# Patient Record
Sex: FEMALE | Race: WHITE | NOT HISPANIC OR LATINO | Employment: OTHER | URBAN - METROPOLITAN AREA
[De-identification: names, ages, dates, MRNs, and addresses within clinical notes are randomized per-mention and may not be internally consistent; named-entity substitution may affect disease eponyms.]

---

## 2024-02-22 PROBLEM — S01.81XA FOREHEAD LACERATION: Status: ACTIVE | Noted: 2024-02-22

## 2024-02-22 RX ORDER — CEVIMELINE HYDROCHLORIDE 30 MG/1
30 CAPSULE ORAL EVERY 12 HOURS
COMMUNITY

## 2024-02-22 RX ORDER — ASPIRIN 81 MG/1
81 TABLET ORAL DAILY
COMMUNITY

## 2024-02-22 RX ORDER — LORAZEPAM 0.5 MG/1
TABLET ORAL
Status: ON HOLD | COMMUNITY
End: 2024-05-09 | Stop reason: ALTCHOICE

## 2024-02-22 RX ORDER — CELECOXIB 200 MG/1
200 CAPSULE ORAL
COMMUNITY
End: 2024-05-21 | Stop reason: HOSPADM

## 2024-02-22 RX ORDER — TERIPARATIDE 250 UG/ML
INJECTION, SOLUTION SUBCUTANEOUS
Status: ON HOLD | COMMUNITY
End: 2024-05-09 | Stop reason: WASHOUT

## 2024-02-22 RX ORDER — MISOPROSTOL 200 UG/1
200 TABLET ORAL 2 TIMES DAILY
COMMUNITY

## 2024-02-22 RX ORDER — SIMVASTATIN 40 MG/1
40 TABLET, FILM COATED ORAL DAILY
COMMUNITY

## 2024-02-22 RX ORDER — VENLAFAXINE 75 MG/1
TABLET ORAL
Status: ON HOLD | COMMUNITY
End: 2024-05-09 | Stop reason: WASHOUT

## 2024-02-22 RX ORDER — BUDESONIDE 3 MG/1
9 CAPSULE, COATED PELLETS ORAL DAILY
COMMUNITY

## 2024-02-22 RX ORDER — LORAZEPAM 1 MG/1
1.5 TABLET ORAL NIGHTLY
COMMUNITY

## 2024-02-22 RX ORDER — MULTIVIT-MIN/FA/LYCOPEN/LUTEIN .4-300-25
TABLET ORAL
COMMUNITY

## 2024-02-22 RX ORDER — LIFITEGRAST 50 MG/ML
1 SOLUTION/ DROPS OPHTHALMIC DAILY
Status: ON HOLD | COMMUNITY
End: 2024-05-09 | Stop reason: WASHOUT

## 2024-02-22 RX ORDER — LISINOPRIL 10 MG/1
10 TABLET ORAL DAILY
COMMUNITY

## 2024-02-22 RX ORDER — GOLIMUMAB 100 MG/ML
1 INJECTION, SOLUTION SUBCUTANEOUS
Status: ON HOLD | COMMUNITY
End: 2024-05-09 | Stop reason: WASHOUT

## 2024-02-22 RX ORDER — OMEGA-3-ACID ETHYL ESTERS 1 G/1
1 CAPSULE, LIQUID FILLED ORAL DAILY
COMMUNITY

## 2024-02-22 RX ORDER — VENLAFAXINE HYDROCHLORIDE 75 MG/1
1 TABLET, EXTENDED RELEASE ORAL DAILY
COMMUNITY

## 2024-02-22 RX ORDER — ACETAMINOPHEN 325 MG/1
TABLET ORAL EVERY 4 HOURS PRN
COMMUNITY
Start: 2018-07-28

## 2024-02-22 RX ORDER — ACETAMINOPHEN 500 MG
1 TABLET ORAL DAILY
COMMUNITY

## 2024-02-22 RX ORDER — PREDNISONE 1 MG/1
3 TABLET ORAL DAILY
COMMUNITY

## 2024-02-22 RX ORDER — LANSOPRAZOLE 30 MG/1
CAPSULE, DELAYED RELEASE ORAL
Status: ON HOLD | COMMUNITY
End: 2024-05-09 | Stop reason: WASHOUT

## 2024-02-22 RX ORDER — BUDESONIDE 9 MG/1
9 TABLET, FILM COATED, EXTENDED RELEASE ORAL DAILY
Status: ON HOLD | COMMUNITY
End: 2024-05-09 | Stop reason: WASHOUT

## 2024-02-22 RX ORDER — CALCIUM CARBONATE/VITAMIN D3 600MG-5MCG
1 TABLET ORAL DAILY
COMMUNITY

## 2024-02-22 RX ORDER — FENOFIBRATE 48 MG/1
48 TABLET, FILM COATED ORAL DAILY
COMMUNITY

## 2024-03-05 ENCOUNTER — APPOINTMENT (OUTPATIENT)
Dept: NEUROSURGERY | Facility: CLINIC | Age: 74
End: 2024-03-05
Payer: MEDICARE

## 2024-03-19 ENCOUNTER — HOSPITAL ENCOUNTER (OUTPATIENT)
Dept: RADIOLOGY | Facility: CLINIC | Age: 74
Discharge: HOME | End: 2024-03-19
Payer: MEDICARE

## 2024-03-19 ENCOUNTER — OFFICE VISIT (OUTPATIENT)
Dept: NEUROSURGERY | Facility: CLINIC | Age: 74
End: 2024-03-19
Payer: MEDICARE

## 2024-03-19 ENCOUNTER — APPOINTMENT (OUTPATIENT)
Dept: NEUROSURGERY | Facility: CLINIC | Age: 74
End: 2024-03-19
Payer: MEDICARE

## 2024-03-19 VITALS
HEART RATE: 94 BPM | TEMPERATURE: 96.4 F | WEIGHT: 128 LBS | BODY MASS INDEX: 21.85 KG/M2 | RESPIRATION RATE: 18 BRPM | DIASTOLIC BLOOD PRESSURE: 87 MMHG | SYSTOLIC BLOOD PRESSURE: 128 MMHG | HEIGHT: 64 IN

## 2024-03-19 DIAGNOSIS — G89.29 CHRONIC MIDLINE LOW BACK PAIN WITH SCIATICA, SCIATICA LATERALITY UNSPECIFIED: Primary | ICD-10-CM

## 2024-03-19 DIAGNOSIS — Z98.1 S/P LUMBAR SPINAL FUSION: ICD-10-CM

## 2024-03-19 DIAGNOSIS — Z98.890 STATUS POST SPINAL SURGERY: ICD-10-CM

## 2024-03-19 DIAGNOSIS — M43.16 SPONDYLOLISTHESIS, LUMBAR REGION: ICD-10-CM

## 2024-03-19 DIAGNOSIS — M54.16 LUMBAR RADICULOPATHY, ACUTE: ICD-10-CM

## 2024-03-19 DIAGNOSIS — M96.0 PSEUDARTHROSIS FOLLOWING SPINAL FUSION: ICD-10-CM

## 2024-03-19 DIAGNOSIS — M43.8X9 SAGITTAL PLANE IMBALANCE: ICD-10-CM

## 2024-03-19 DIAGNOSIS — M53.2X6 LUMBAR SPINE INSTABILITY: ICD-10-CM

## 2024-03-19 DIAGNOSIS — M48.061 LUMBAR FORAMINAL STENOSIS: ICD-10-CM

## 2024-03-19 DIAGNOSIS — M51.36 LUMBAR SPINAL STENOSIS DUE TO ADJACENT SEGMENT DISEASE AFTER FUSION PROCEDURE: ICD-10-CM

## 2024-03-19 DIAGNOSIS — M54.40 CHRONIC MIDLINE LOW BACK PAIN WITH SCIATICA, SCIATICA LATERALITY UNSPECIFIED: Primary | ICD-10-CM

## 2024-03-19 DIAGNOSIS — M48.061 LUMBAR SPINAL STENOSIS DUE TO ADJACENT SEGMENT DISEASE AFTER FUSION PROCEDURE: ICD-10-CM

## 2024-03-19 PROCEDURE — 1125F AMNT PAIN NOTED PAIN PRSNT: CPT | Performed by: NEUROLOGICAL SURGERY

## 2024-03-19 PROCEDURE — 1036F TOBACCO NON-USER: CPT | Performed by: NEUROLOGICAL SURGERY

## 2024-03-19 PROCEDURE — 99215 OFFICE O/P EST HI 40 MIN: CPT | Performed by: NEUROLOGICAL SURGERY

## 2024-03-19 PROCEDURE — 99205 OFFICE O/P NEW HI 60 MIN: CPT | Performed by: NEUROLOGICAL SURGERY

## 2024-03-19 PROCEDURE — 72082 X-RAY EXAM ENTIRE SPI 2/3 VW: CPT

## 2024-03-19 PROCEDURE — 72082 X-RAY EXAM ENTIRE SPI 2/3 VW: CPT | Performed by: RADIOLOGY

## 2024-03-19 PROCEDURE — 1159F MED LIST DOCD IN RCRD: CPT | Performed by: NEUROLOGICAL SURGERY

## 2024-03-19 RX ORDER — TRAMADOL HYDROCHLORIDE 50 MG/1
50 TABLET ORAL 2 TIMES DAILY PRN
COMMUNITY
End: 2024-05-21 | Stop reason: HOSPADM

## 2024-03-19 RX ORDER — METHOCARBAMOL 500 MG/1
500 TABLET, FILM COATED ORAL 3 TIMES DAILY PRN
COMMUNITY

## 2024-03-19 RX ORDER — GABAPENTIN 100 MG/1
100 CAPSULE ORAL 2 TIMES DAILY
COMMUNITY

## 2024-03-19 ASSESSMENT — PAIN SCALES - GENERAL: PAINLEVEL: 7

## 2024-03-19 NOTE — PROGRESS NOTES
It was a pleasure to see Ms. Jack at the [facility] at [facility].     She is a really nice 73 y.o. female  who presents to us with complaints MID BACK PAIN  that started about  3 months ago, and have been gradually worsening since that time.  The symptoms started after no known injury    The pain is 7 /10. The pain is described as aching, burning, pinching, and stabbing and occurs all day.  Symptoms are exacerbated by walking for more than a few minutes. Factors which relieve the pain include change in body position and sitting       Numbness and/or tingling - YES- left thigh    Weakness : YES    Bladder/Bowel symptoms - NO    The patient has tried medications (eg: gabapentin, NSAIDS and narcotics ) : Yes- gabapentin   PT : Yes    Date: 2024  Pain Management with ESIs/selective nerve blocks  - YES    she is a NON-SMOKER and NON-DIABETIC    History of Depression : NO    PRIOR SPINE SURGERY: YES- L3-L4 fusion March 2023 (Dr. Neptali [facility]; September 2020 lamiectomy w/ fusion L4-L5 ([facility])    She has a known diagnosis of rheumatoid arthritis and has been on Orencia for that.  She is also on Vibrezi for IBS.    Is currently on Aspirin 81mg daily    NARCOTICS for pain : NO    Part of this patient’s history is from personal review of the patient’s previous charts.      No past medical history on file.        Current Outpatient Medications:     acetaminophen (Tylenol) 325 mg tablet, Take by mouth every 4 hours if needed., Disp: , Rfl:     aspirin 81 mg EC tablet, Take 1 tablet (81 mg) by mouth once daily., Disp: , Rfl:     budesonide EC (Entocort EC) 3 mg 24 hr capsule, Take by mouth., Disp: , Rfl:     budesonide ER (Uceris) 9 mg tablet, Take by mouth., Disp: , Rfl:     calcium carbonate-vitamin D3 (Calcium 600 + D,3,) 600 mg-5 mcg (200 unit) tablet, Take by mouth., Disp: , Rfl:     celecoxib (CeleBREX) 200 mg capsule, Take by mouth., Disp: , Rfl:      cevimeline (Evoxac) 30 mg capsule, Take by mouth., Disp: , Rfl:     cholecalciferol (Vitamin D-3) 50 mcg (2,000 unit) capsule, Take by mouth., Disp: , Rfl:     eluxadoline (VIBERZI ORAL), Take 50 mg by mouth., Disp: , Rfl:     fenofibrate (Tricor) 48 mg tablet, Take by mouth., Disp: , Rfl:     golimumab (Simponi) 100 mg/mL syringe, Inject 1 Dose under the skin., Disp: , Rfl:     lansoprazole (Prevacid) 30 mg DR capsule, Take by mouth., Disp: , Rfl:     lifitegrast (Xiidra) 5 % dropperette, Administer into affected eye(s)., Disp: , Rfl:     lisinopril 10 mg tablet, Take by mouth., Disp: , Rfl:     LORazepam (Ativan) 0.5 mg tablet, Take by mouth., Disp: , Rfl:     LORazepam (Ativan) 1 mg tablet, Take by mouth., Disp: , Rfl:     miSOPROStoL (Cytotec) 200 mcg tablet, Take by mouth., Disp: , Rfl:     multivit-mineral-iron-lutein tablet, Take 1 tablet by mouth once daily., Disp: , Rfl:     multivitamin with minerals iron-free (Centrum Silver), Take by mouth., Disp: , Rfl:     omega-3 acid ethyl esters (Lovaza) 1 gram capsule, Take by mouth., Disp: , Rfl:     predniSONE (Deltasone) 5 mg tablet, Take by mouth., Disp: , Rfl:     simvastatin (Zocor) 40 mg tablet, Take by mouth., Disp: , Rfl:     teriparatide (Forteo) injection, Inject under the skin., Disp: , Rfl:     venlafaxine (Effexor) 75 mg tablet, Take by mouth., Disp: , Rfl:     venlafaxine 75 mg 24 hr tablet, Take 1 tablet (75 mg) by mouth once daily., Disp: , Rfl:       Review of Systems :   Constitutional: No fever or chills  Respiratory: No shortness of breath or wheezing  Musculoskeletal: see HPI above   Neurologic: See HPI above      EXAM:   There were no vitals filed for this visit.  GENERAL: no apparent distress  EYES: No icterus;  extraocular movements intact   ENT: No mucosal ulcerations; normal hard and soft palate   NECK: trachea midline; no thyromegaly or lymphadenopathy   RESPIRATORY: normal respiratory effort; no audible wheezes/rhonchi  EXTREMITIES:  normal pulses, no edema  SKIN: Normal temperature; no rash, ulcers or subcutaneous nodules       MUSCULOSKELETAL:   Stance: Flexed posterior with obvious positive clinical sagittal malalignment  Gait: Normal. Patient is unable able to toe, heel, and tandem walk.  Range of motion of the lumbar spine is decreased .   Paraspinal muscle spasm/tenderness absent. No palpable tenderness along the spine.   HEMANT test : Negative   SLR test: Positive on the left at 30 degrees.  Well-healed incision present in the lower back from previous surgeries.      NEUROLOGIC:   Patient is awake, alert and oriented to name, place and time.  No obvious cranial nerve deficit.   Motor: Bulk and tone WNL.  Strength: She has evidence of  Chronic finger deformity secondary to rheumatoid arthritis and fingers involving the upper extremities especially on the right hand.  Motor strength is about 4 to 4+/5 throughout bilateral lower extremities today with no significant dense focal deficit.  Reflexes are symmetric throughout.   Sensory exam shows no gross dermatomal sensory deficits to light touch and pain  Romberg test: Negative  Painter's sign absent  Distal pulses well palpable in the lower extremity.      IMAGING:   MRI (2024) L1-L2: Disc space looks normal. No significant central or foraminal  stenosis.     There is metallic internal fixation from L3 through L5 with transpedicular  screws in grossly good position and posterior fixation in grossly good  position. L3 is newly transfixed compared to 3/6/2023. There is laminectomy  surgery from L3 through L5.     L2-L3: There is an anterolisthesis of L2 in relation to L3 approximately  5.6 mm in magnitude. This is new subluxation compared to 3/6/2023. There is  left paracentral disc extrusion as seen best on the sagittal images and  obscured partially by motion artifact on the axial images. It measures 15.8  x 8.4 mm in CC, AP projection as seen on series 4 image 8. Transverse  extension  laterally into the right neural foramen and contributes to  compression and upward displacement of the exiting L2 nerve root on the  left..     L3-L4: Advanced disc degeneration with almost complete loss of the disc  space. Presents an interval progression but without significant central  canal stenosis. Neural foramina are difficult to assess due to motion  artifact and metallic artifact but without expected exiting nerve root  compression.     L4-L5: There is an anterolisthesis of L4 on L5 6 mm in magnitude. This is  unchanged. There is advanced disc degeneration. No overall central canal  stenosis. Neural foramina are difficult to assess. There is concern for  possible contact and compression of the exiting L4 nerve root on the right.  Grossly unchanged.     L5-S1: Mild retrolisthesis of L5 in relation to S1 about 2 mm in magnitude  and unchanged. Disc spaces preserved in height. There is a mild diffuse  disc bulge. No central canal stenosis. There is foraminal encroachment  bilateral likely causing contact without direct compression of the exiting  L5 nerve roots (left more than right) again metallic artifact and motion  limits evaluation.     No definite acute fracture. Surrounding soft tissues are not optimally  assessed. No obvious acute changes. No abnormal enhancement of the bone  marrow, nerve roots, disc space, or extruded disc fragment described above.     IMPRESSION:   1. The dominant finding is a large left paracentral disc extrusion at L2-L3  measuring as much is 16 mm with entrance into the left neural foramen and  causing compression and upward displacement of the exiting L2 nerve root.  2. Other degenerative changes including anterolisthesis of L4 on L5 and  retrolisthesis of L5 on S1 unchanged. Other areas of foraminal stenosis  described above including potential compression of the exiting L4 nerve  root on the unchanged.  3.There is an anterolisthesis of L2 in relation to L3 approximately 5.6  mm  in magnitude. This is new subluxation compared to 3/6/2023.        MRI of the lumbar spine that was done in January 2024 was reviewed personally and shows presence of a previous L3-5 instrumentation and fusion but with evidence of L2-3 spondylolisthesis with a very large extruded disc herniation resulting in severe central canal and left foraminal stenosis.  There is also presence of left L5-S1 foraminal stenosis.  Evidence of previous laminectomy defect from L3-5.  CT scan of the abdomen with bony reconstruction of the spine was also reviewed personally and shows evidence of previous L3-5 fusion with instrumentation with no evidence of significant bony fusion across the segment suggestive of possible pseudoarthrosis.  There is evidence of displacement of superior screws involving the L3 into the superior endplate of L3 with violation of the L2-3 disc space.  Bone density test done recently shows presence of normal bone density with a T-score of minus 1.2 at femoral neck  AP and lateral x-rays of the lumbar spine with flexion-extension views done in January 2024 was also reviewed and showed presence of a previous L3-5 instrumentation and fusion with evidence of grade 1 to borderline grade 2 spondylolisthesis at L2-3 level.  AP and lateral long cassette  scoliosis films that were done today shows presence of a well aligned spine but with evidence of about 10 cm of positive sagittal alignment measured by hip odontoid axis.  The pelvic incidence measures about 54 degrees with only 3 degrees of lumbar lordosis and pelvic tilt of about 34 degrees.  There is very clear evidence of lumbar lordosis and pelvic incidence mismatch is about 50 degrees.  Evidence of almost grade 2 L2-3 spondylolisthesis.   MRI of the cervical spine from 2021 shows central and foraminal stenosis at C4-5 and C5-6.      ASSESSMENT AND PLAN:    Ms. Jack is a really nice 73 y.o. female who comes to see me today along with her 2 sisters for  her ongoing issues in terms of her lower back.  A past history significant for evidence of rheumatoid arthritis on treatment with Orencia at this point and 2 spine surgeries in her lower back the first 1 was an L4-5 instrumentation and fusion [facility] and the second was extension of the fusion up to L3 at [facility] in March 2023.  She did well following the second surgery in 2023 for about few months or so but over the past 3 to 4 months she has been extremely symptomatic with severe low back pain and lower extremity pain bilaterally but more so on the left lower extremity and difficulty ambulating and walking disc standing up straight with a significant bent forward posture.  She used to be very functional until about 4 to 5 months back where she was able to participate in majority of her activity living and at this point of time is significantly limited as she spent majority of the time at her home and anyone for that she has to use a walker.  When she goes out of her home she has to resolve to have a wheelchair.  Overall at this point of time she has tried physical therapy pain management and continues to be significantly limited secondary to the symptoms of severe back pain and lower extremity pain and difficult to ambulate any significant distance whatsoever.  She has seen her previous surgeon at [facility] and she is seeing me for another opinion.     I have reviewed imaging and diagnosis with the patient, discussed the natural history of the disease and both non-operative and operative treatments available and rationale vs risks for both.     The patient's clinical symptoms correlates well with the radiological findings.    She has been having significant functional impairment with decreased ability to perform her ADL secondary to pain and/or weakness    The pain has been debilitating on a daily basis with a score of more than 5 on scale of 0 - 10.    She has tried various conservative  treatment options that included use of PAIN MEDICATIONS and formal PHYSICIAN DIRECTED PHYSICAL THERAPY (PT) program.    She also underwent various injections ( Transforaminal/Epidural steroid injections) and have also consulted PAIN MANAGEMENT and continues to be symptomatic.  There are no noncompliance issues.     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  1) Continued nonoperative treatment even though she has already failed trial of physical therapy and pain management given risk of any further surgery  2) spinal cord stimulator trial and placement of the same.  3) revision spine surgery with  L2/3 decompression alone and extension of the fusion up to L2-3 clearly understanding the fact that she may remain at high risk of another adjacent segment failure as it happened a couple of times in the past.  4) definitive revision surgery with decompression at L2-3 but at the same time correction of her spinal alignment that in my opinion is predisposing her to repeated failures from lumbar fusions that she had so far and now clearly has a significant adult symptomatic spinal deformity mainly in the sagittal plane.     I extensively discussed with him all the options and especially the surgical options with limited  decompression and fusion versus decompression and a correction of spinal alignment issues and given the fact that her bone quality is within normal limits and the fact that she had a very good quality of life until about few months back that is significantly diminished secondary to the symptoms that she has and the imaging findings.  I do not recommend any focal surgery given the high risk of failure and at this point gave her 2 options 1 of which would be to continue with nonoperative treatment with possible spinal cord stimulator trial versus proceeding with a definitive surgery as mentioned in the option for above.  Pros and cons of this were extensively discussed with the patient and her sisters.    If they would surgery, I would recommend Removal of previous L3-5 instrumentation, exploration of previous fusion, L5-S1 decompression and transforaminal lumbar interbody fusion, L3-4 L4-5 posterior column osteotomy L2-3 laminectomy and facetectomy for decompression with the T11-S1 instrumentation and fusion with pelvic fixation.    Patient has the following high risk factor for pseudarthrosis   . There is insufficient autograft tissue for the intended procedure   · Poor quality bone (Osteopenia/osteoporosis)   · Lumbar pseudoarthrosis   · Lumbar fusion greater than or equal to 2 levels  .Presence of immunocompromised condition  .History of long term corticosteroid use   Hence I have clearly discussed the use of off label rhBMP-2 ( INFUSE ) and other alternatives for fusion and other biologics and reccommended the use of rhBMP-2. She understands the risk and benefits of utilizing rhBMP-2 and have agreed to the use of rhBMP during surgery for achieving spinal fusion.    I have explained the surgical procedure in detail, duration of surgery ( 6-8 hrs ) with expected duration and extent of recovery along risks of surgery that include, but is not limited to bleeding, infection, blood vessel injury or damage,loss of  sensation, loss of bladder, bowel or sexual function, nerve injury/damage resulting in weakness/paralysis, malunion, nonunion, CSF leak, brachial plexus injury, peripheral vision blindness, injury to the abdominal contents, failure of implants/fusion, failure to relieve symptoms, recurrent disease, adjacent segment disease, need to reoperate for any reason and general anesthesia reaction such as stroke, coma, heart attack, delirium, confusion, death as well as worsening of preexisted medical conditions and any other unforeseen complication related to unrelated to the spinal procedure per se.  Specifically discussed were implant related failures such as roxane fracture and Proximal Junctional Kyphosis (PJK) and proximal junctional failure (PJF) with possibility of requiring revision surgery in the form of extension of fusion if that were to happen especially symptomatic up to an extent of about 15 % - 20 %.  The limitations secondary to stiffness in the spine despite a successful surgery from long segment fusion impacting day to day life was also discussed.     A Shared decision was made to proceed with surgery after involving the patient and her family in the treatment decision-making process.  They clearly expressed understanding of possible risks and benefits of surgery and the realistic expectations of surgery along with the fact that the goal of the surgery is to improve the  overall functioning and quality of life by improvement of the current level of function,  possible improvement and/or prevent progression of preexisting neurological deficits and not necessarily 100 % pain relief. There is no guarantee that the prexisiting deficits will improve definitely after surgery. The option of continued non-operative management was very clearly discussed as well with its associated risks and benefits and the patient was clearly provided that option.     Her most recent cervical spine MRI was in 2021 that showed evidence  of central canal stenosis and even though it was elected to manage her nonoperatively by her previously treated surgery given the fact that she would require a major surgery for her lumbar spine I would want to obtain another MRI to rule out any significant spinal cord compression that would preclude us from proceeding with surgery on her lumbar spine..    She would need to come off her Orencia about 3 weeks before surgery and for about until 3-4 weeks after.  Would be preferable to stop steroids too if possible for the same duration of time.  Can continue ASA throughout.    It was a pleasure to participate in Ms. Jack clinical care. All questions were answered to her satisfaction and she explained understanding of the further treatment plan.     Clint Cheung MD, [facility]   of Neurological Surgery  [facility]  Attending Surgeon  Director - Minimally Invasive Spine Surgery  [place], [place]      ---Some of this note was completed using Dragon voice recognition technology and sometimes the software misinterprets words. This may include unintended errors with respect to translation of words, typographical errors or grammar errors which may not have been identified prior to finalization of the chart note. Please take this into account when reading this note---      Clint Cheung MD, [facility]   of Neurological Surgery  [facility]  Attending Surgeon  Director - Minimally Invasive Spine Surgery  [place], [place]      Some of this note was completed using Dragon voice recognition technology and sometimes the software misinterprets words. This may include unintended errors with respect to translation of words, typographical errors or grammar errors which may not have been identified prior to finalization of the chart note. Please take  this into account when reading this note

## 2024-03-20 DIAGNOSIS — G95.9 CERVICAL MYELOPATHY (MULTI): ICD-10-CM

## 2024-03-26 ENCOUNTER — HOSPITAL ENCOUNTER (OUTPATIENT)
Facility: HOSPITAL | Age: 74
Setting detail: SURGERY ADMIT
End: 2024-03-26
Attending: NEUROLOGICAL SURGERY | Admitting: NEUROLOGICAL SURGERY
Payer: MEDICARE

## 2024-03-26 DIAGNOSIS — M48.061 SPINAL STENOSIS OF LUMBAR REGION, UNSPECIFIED WHETHER NEUROGENIC CLAUDICATION PRESENT: ICD-10-CM

## 2024-03-26 PROBLEM — Z98.1 LUMBAR SPINAL STENOSIS DUE TO ADJACENT SEGMENT DISEASE AFTER FUSION PROCEDURE: Status: ACTIVE | Noted: 2024-03-19

## 2024-03-26 PROBLEM — M51.369 LUMBAR SPINAL STENOSIS DUE TO ADJACENT SEGMENT DISEASE AFTER FUSION PROCEDURE: Status: ACTIVE | Noted: 2024-03-19

## 2024-03-26 PROBLEM — M43.8X9 SAGITTAL PLANE IMBALANCE: Status: ACTIVE | Noted: 2024-03-19

## 2024-03-26 PROBLEM — M96.0 PSEUDARTHROSIS FOLLOWING SPINAL FUSION: Status: ACTIVE | Noted: 2024-03-19

## 2024-03-26 PROBLEM — M51.36 LUMBAR SPINAL STENOSIS DUE TO ADJACENT SEGMENT DISEASE AFTER FUSION PROCEDURE: Status: ACTIVE | Noted: 2024-03-19

## 2024-03-26 PROBLEM — M53.2X6 LUMBAR SPINE INSTABILITY: Status: ACTIVE | Noted: 2024-03-19

## 2024-03-26 PROBLEM — M43.16 SPONDYLOLISTHESIS, LUMBAR REGION: Status: ACTIVE | Noted: 2024-03-19

## 2024-03-26 PROBLEM — Z98.890 STATUS POST SPINAL SURGERY: Status: ACTIVE | Noted: 2024-03-19

## 2024-04-10 ENCOUNTER — TELEPHONE (OUTPATIENT)
Dept: NEUROSURGERY | Facility: HOSPITAL | Age: 74
End: 2024-04-10
Payer: MEDICARE

## 2024-04-10 NOTE — TELEPHONE ENCOUNTER
Talked to pt after discussion with Dr. Cheung, she had called in and said that she could not finish the MRI because of pain and spasms. Since she is in [place] , I told her that Dr. Cheung asked her to talk to her PCP to see if he could order some Diazepam before the MRI. I told her since she is in [place] , he can not order it. She said she will call and then try it.

## 2024-05-03 ENCOUNTER — TELEMEDICINE CLINICAL SUPPORT (OUTPATIENT)
Dept: PREADMISSION TESTING | Facility: HOSPITAL | Age: 74
End: 2024-05-03
Payer: MEDICARE

## 2024-05-03 RX ORDER — ABATACEPT 125 MG/ML
125 INJECTION, SOLUTION SUBCUTANEOUS
Status: ON HOLD | COMMUNITY
Start: 2023-05-23 | End: 2024-05-20

## 2024-05-03 RX ORDER — DENOSUMAB 60 MG/ML
60 INJECTION SUBCUTANEOUS
COMMUNITY

## 2024-05-03 RX ORDER — CYCLOSPORINE 0.5 MG/ML
1 EMULSION OPHTHALMIC NIGHTLY
COMMUNITY
Start: 2022-11-17

## 2024-05-03 RX ORDER — AMLODIPINE BESYLATE 5 MG/1
5 TABLET ORAL DAILY
COMMUNITY
Start: 2022-05-26

## 2024-05-03 ASSESSMENT — ENCOUNTER SYMPTOMS
VISUAL CHANGE: 1
LIMITED RANGE OF MOTION: 1
CARDIOVASCULAR NEGATIVE: 1
DIARRHEA: 1
WEAKNESS: 1
NECK PAIN: 1
CONSTITUTIONAL NEGATIVE: 1

## 2024-05-03 ASSESSMENT — DUKE ACTIVITY SCORE INDEX (DASI)
DASI METS SCORE: 3.6
CAN YOU DO LIGHT WORK AROUND THE HOUSE LIKE DUSTING OR WASHING DISHES: YES
CAN YOU PARTICIPATE IN STRENOUS SPORTS LIKE SWIMMING, SINGLES TENNIS, FOOTBALL, BASKETBALL, OR SKIING: NO
CAN YOU DO YARD WORK LIKE RAKING LEAVES, WEEDING OR PUSHING A MOWER: NO
CAN YOU CLIMB A FLIGHT OF STAIRS OR WALK UP A HILL: NO
CAN YOU RUN A SHORT DISTANCE: NO
CAN YOU PARTICIPATE IN MODERATE RECREATIONAL ACTIVITIES LIKE GOLF, BOWLING, DANCING, DOUBLES TENNIS OR THROWING A BASEBALL OR FOOTBALL: NO
TOTAL_SCORE: 7.2
CAN YOU WALK INDOORS, SUCH AS AROUND YOUR HOUSE: YES
CAN YOU DO HEAVY WORK AROUND THE HOUSE LIKE SCRUBBING FLOORS OR LIFTING AND MOVING HEAVY FURNITURE: NO
CAN YOU HAVE SEXUAL RELATIONS: NO
CAN YOU TAKE CARE OF YOURSELF (EAT, DRESS, BATHE, OR USE TOILET): YES
CAN YOU DO MODERATE WORK AROUND THE HOUSE LIKE VACUUMING, SWEEPING FLOORS OR CARRYING GROCERIES: NO
CAN YOU WALK A BLOCK OR TWO ON LEVEL GROUND: NO

## 2024-05-03 NOTE — SIGNIFICANT EVENT
05/03/24 1443   DASI Activity Score Index   Can you take care of yourself (eat, dress, bathe, or use toilet)?  2.75   Can you walk indoors, such as around your house? 1.75   Can you walk a block or two on level ground?  0   Can you climb a flight of stairs or walk up a hill? 0   Can you run a short distance? 0   Can you do light work around the house like dusting or washing dishes? 2.7   Can you do moderate work around the house like vacuuming, sweeping floors or carrying groceries? 0   Can you do heavy work around the house like scrubbing floors or lifting and moving heavy furniture?  0   Can you do yard work like raking leaves, weeding or pushing a mower? 0   Can you have sexual relations? 0   Can you participate in moderate recreational activities like golf, bowling, dancing, doubles tennis or throwing a baseball or football? 0   Can you participate in strenous sports like swimming, singles tennis, football, basketball, or skiing? 0   DASI SCORE 7.2   METS Score (Will be calculated only when all the questions are answered) 3.6

## 2024-05-03 NOTE — CPM/PAT NURSE NOTE
CPM/PAT Nurse Note      Name: Michelle Jack /Age: 1950/73 y.o.     Michelle Jack is scheduled for L4-5 laminectomy and facetectomy for decompression on 24  Past Medical History:   Diagnosis Date    Anxiety     Arthritis     Depression     Does mobilize using walker     Encounter for electrocardiogram 2024    Sinus tachycardia  Otherwise normal EKG When compared with ECG of 26-Mar-2023 16:10,  Nonspecific T wave abnormalities no longer evident in Lateral leads    GERD (gastroesophageal reflux disease)     Glaucoma     History of blood transfusion     Transfused in 2016    History of recent steroid use     Rheumatoid arthritis    Hyperlipidemia     Hypertension     Managed by PCP    Joint pain     Lumbar disc disease     Osteoporosis     Rheumatoid arthritis (Multi)     Sjogren's syndrome (Multi)     Spinal stenosis     Lumbar spinal stenosis    Ulcerative colitis (Multi)     Vision loss     Wears glasses       Past Surgical History:   Procedure Laterality Date    APPENDECTOMY  2018    Appendectomy    CATARACT EXTRACTION  2018    Cataract Surgery    COLONOSCOPY      FEMUR FRACTURE SURGERY Right 2016    Femur Repair    FOOT SURGERY  2018    Foot Surgery    HYSTERECTOMY  2018    Hysterectomy    OTHER SURGICAL HISTORY  2018    Claw Toe Operation By Joint Fusion    OTHER SURGICAL HISTORY  2018    Hand Joint Arthrodesis    OTHER SURGICAL HISTORY  2018    Wrist Synovectomy Extensor Tendon Sheath, Resect Distal Ulna    OTHER SURGICAL HISTORY  2018    Oral Surgery Tooth Extraction [place] Tooth    SPINAL FUSION      TONSILLECTOMY  2018    Tonsillectomy    TOTAL SHOULDER ARTHROPLASTY      Left shoulder       Patient  has no history on file for sexual activity.    Family History   Problem Relation Name Age of Onset    Heart disease Mother      Heart disease Father      Breast cancer Sister         Allergies   Allergen Reactions    Morphine Swelling        Prior to Admission medications    Medication Sig Start Date End Date Taking? Authorizing Provider   acetaminophen (Tylenol) 325 mg tablet Take by mouth every 4 hours if needed. 7/28/18   Historical Provider, MD   aspirin 81 mg EC tablet Take 1 tablet (81 mg) by mouth once daily.    Historical Provider, MD   budesonide EC (Entocort EC) 3 mg 24 hr capsule Take by mouth.    Historical Provider, MD   budesonide ER (Uceris) 9 mg tablet Take by mouth.    Historical Provider, MD   calcium carbonate-vitamin D3 (Calcium 600 + D,3,) 600 mg-5 mcg (200 unit) tablet Take by mouth.    Historical Provider, MD   celecoxib (CeleBREX) 200 mg capsule Take by mouth.    Historical Provider, MD   cevimeline (Evoxac) 30 mg capsule Take by mouth.    Historical Provider, MD   cholecalciferol (Vitamin D-3) 50 mcg (2,000 unit) capsule Take by mouth.    Historical Provider, MD   eluxadoline (VIBERZI ORAL) Take 50 mg by mouth.    Historical Provider, MD   fenofibrate (Tricor) 48 mg tablet Take by mouth.    Historical Provider, MD   gabapentin (Neurontin) 100 mg capsule Take 1 capsule (100 mg) by mouth 2 times a day.    Historical Provider, MD   golimumab (Simponi) 100 mg/mL syringe Inject 1 Dose under the skin.    Historical Provider, MD   lansoprazole (Prevacid) 30 mg DR capsule Take by mouth.    Historical Provider, MD   lifitegrast (Xiidra) 5 % dropperette Administer into affected eye(s).    Historical Provider, MD   lisinopril 10 mg tablet Take by mouth.    Historical Provider, MD   LORazepam (Ativan) 0.5 mg tablet Take by mouth.    Historical Provider, MD   LORazepam (Ativan) 1 mg tablet Take by mouth.    Historical Provider, MD   methocarbamol (Robaxin) 500 mg tablet Take 1 tablet (500 mg) by mouth 3 times a day.    Historical Provider, MD   miSOPROStoL (Cytotec) 200 mcg tablet Take by mouth.    Historical Provider, MD   multivit-mineral-iron-lutein tablet Take 1 tablet by mouth once daily.    Historical Provider, MD   multivitamin  with minerals iron-free (Centrum Silver) Take by mouth.    Historical Provider, MD   omega-3 acid ethyl esters (Lovaza) 1 gram capsule Take by mouth.    Historical Provider, MD   predniSONE (Deltasone) 5 mg tablet Take by mouth.    Historical Provider, MD   simvastatin (Zocor) 40 mg tablet Take by mouth.    Historical Provider, MD   teriparatide (Forteo) injection Inject under the skin.    Historical Provider, MD   traMADol ER (Ultram-ER) 200 mg 24 hr tablet Take 1 tablet (200 mg) by mouth once daily. Do not crush, chew, or split.    Historical Provider, MD   venlafaxine (Effexor) 75 mg tablet Take by mouth.    Historical Provider, MD   venlafaxine 75 mg 24 hr tablet Take 1 tablet (75 mg) by mouth once daily.    Historical Provider, MD        PAT ROS:   Constitutional:   neg    Neuro/Psych:    weakness  Eyes:    vision loss  Ears:   Nose:   neg    Mouth:   neg    Throat:   neg    Neck:    neck pain  Cardio:   neg    Respiratory:   Endocrine:   GI:    diarrhea  :   Musculoskeletal:    decreased ROM  Hematologic:    history of blood transfusion  Skin:  neg        DASI Risk Score      Flowsheet Row Most Recent Value   DASI SCORE 7.2 (P)    METS Score (Will be calculated only when all the questions are answered) 3.6 (P)           Caprini DVT Assessment    No data to display       Modified Frailty Index    No data to display       CHADS2 Stroke Risk  Current as of 29 minutes ago        N/A 3 to 100%: High Risk   2 to < 3%: Medium Risk   0 to < 2%: Low Risk     Last Change: N/A          This score determines the patient's risk of having a stroke if the patient has atrial fibrillation.        This score is not applicable to this patient. Components are not calculated.          Revised Cardiac Risk Index    No data to display       Apfel Simplified Score    No data to display       Risk Analysis Index Results This Encounter    No data found in the last 1 encounters.     Providers:    PCP: Sd Guerra in [place]  [phone]    Neuro: Clint Cheung 3/19/24, Chronic midline low back pain with sciatica    Rheu: Omero Downing, [phone]    ---Testing:    - EKG: 3/4/24 at [facility]  Sinus tachycardia   Otherwise normal EKG  When compared with ECG of 26-Mar-2023 16:10,   Nonspecific T wave abnormalities no longer evident in Lateral leads     - METS: 3.6    - CT Abdomen/Pelvis: 3/4/24 at [facility]  IMPRESSION:   1. Interval decrease in size of the left perineal abscess.   2. New mild distal colonic wall thickening and pericolic inflammatory   changes suggesting mild colitis.     Medication instructions:    Instructed to hold Vitamins, Supplements and Ibuprofen 7 days prior to surgery     Ashly Maravilla LPN  Preadmission Testing

## 2024-05-08 ENCOUNTER — HOSPITAL ENCOUNTER (INPATIENT)
Facility: HOSPITAL | Age: 74
LOS: 13 days | Discharge: NURSING, REHAB OR LONG-TERM CARE | DRG: 454 | End: 2024-05-21
Attending: EMERGENCY MEDICINE | Admitting: NEUROLOGICAL SURGERY
Payer: MEDICARE

## 2024-05-08 ENCOUNTER — APPOINTMENT (OUTPATIENT)
Dept: RADIOLOGY | Facility: HOSPITAL | Age: 74
DRG: 454 | End: 2024-05-08
Payer: MEDICARE

## 2024-05-08 ENCOUNTER — CLINICAL SUPPORT (OUTPATIENT)
Dept: EMERGENCY MEDICINE | Facility: HOSPITAL | Age: 74
DRG: 454 | End: 2024-05-08
Payer: MEDICARE

## 2024-05-08 DIAGNOSIS — Z98.1 S/P SPINAL FUSION: ICD-10-CM

## 2024-05-08 DIAGNOSIS — M48.061 LUMBAR SPINAL STENOSIS DUE TO ADJACENT SEGMENT DISEASE AFTER FUSION PROCEDURE: ICD-10-CM

## 2024-05-08 DIAGNOSIS — M51.36 LUMBAR SPINAL STENOSIS DUE TO ADJACENT SEGMENT DISEASE AFTER FUSION PROCEDURE: ICD-10-CM

## 2024-05-08 DIAGNOSIS — G89.18 ACUTE POST-OPERATIVE PAIN: ICD-10-CM

## 2024-05-08 DIAGNOSIS — M43.8X9 SAGITTAL PLANE IMBALANCE: ICD-10-CM

## 2024-05-08 DIAGNOSIS — Z98.890 STATUS POST SPINAL SURGERY: ICD-10-CM

## 2024-05-08 DIAGNOSIS — G95.9 CERVICAL MYELOPATHY (MULTI): Primary | ICD-10-CM

## 2024-05-08 DIAGNOSIS — R60.0 LOCALIZED EDEMA: ICD-10-CM

## 2024-05-08 DIAGNOSIS — M96.0 PSEUDARTHROSIS FOLLOWING SPINAL FUSION: ICD-10-CM

## 2024-05-08 DIAGNOSIS — M48.061 LUMBAR FORAMINAL STENOSIS: ICD-10-CM

## 2024-05-08 DIAGNOSIS — M53.2X6 LUMBAR SPINE INSTABILITY: ICD-10-CM

## 2024-05-08 DIAGNOSIS — R00.0 TACHYCARDIA: ICD-10-CM

## 2024-05-08 DIAGNOSIS — M43.16 SPONDYLOLISTHESIS, LUMBAR REGION: ICD-10-CM

## 2024-05-08 DIAGNOSIS — I51.9 HEART DISEASE: ICD-10-CM

## 2024-05-08 DIAGNOSIS — I97.89 OTHER POSTPROCEDURAL COMPLICATIONS AND DISORDERS OF THE CIRCULATORY SYSTEM, NOT ELSEWHERE CLASSIFIED: ICD-10-CM

## 2024-05-08 LAB
ALBUMIN SERPL BCP-MCNC: 3.8 G/DL (ref 3.4–5)
ALP SERPL-CCNC: 38 U/L (ref 33–136)
ALT SERPL W P-5'-P-CCNC: 15 U/L (ref 7–45)
ANION GAP SERPL CALC-SCNC: 15 MMOL/L (ref 10–20)
APPEARANCE UR: CLEAR
AST SERPL W P-5'-P-CCNC: 12 U/L (ref 9–39)
BASOPHILS # BLD AUTO: 0.05 X10*3/UL (ref 0–0.1)
BASOPHILS NFR BLD AUTO: 0.4 %
BILIRUB SERPL-MCNC: 0.3 MG/DL (ref 0–1.2)
BILIRUB UR STRIP.AUTO-MCNC: NEGATIVE MG/DL
BUN SERPL-MCNC: 24 MG/DL (ref 6–23)
CALCIUM SERPL-MCNC: 9.5 MG/DL (ref 8.6–10.6)
CARDIAC TROPONIN I PNL SERPL HS: 7 NG/L (ref 0–34)
CHLORIDE SERPL-SCNC: 102 MMOL/L (ref 98–107)
CO2 SERPL-SCNC: 25 MMOL/L (ref 21–32)
COLOR UR: COLORLESS
CREAT SERPL-MCNC: 0.62 MG/DL (ref 0.5–1.05)
EGFRCR SERPLBLD CKD-EPI 2021: >90 ML/MIN/1.73M*2
EOSINOPHIL # BLD AUTO: 0.05 X10*3/UL (ref 0–0.4)
EOSINOPHIL NFR BLD AUTO: 0.4 %
ERYTHROCYTE [DISTWIDTH] IN BLOOD BY AUTOMATED COUNT: 14.3 % (ref 11.5–14.5)
GLUCOSE SERPL-MCNC: 125 MG/DL (ref 74–99)
GLUCOSE UR STRIP.AUTO-MCNC: NORMAL MG/DL
HCT VFR BLD AUTO: 40.8 % (ref 36–46)
HGB BLD-MCNC: 13.8 G/DL (ref 12–16)
HOLD SPECIMEN: NORMAL
HOLD SPECIMEN: NORMAL
IMM GRANULOCYTES # BLD AUTO: 0.06 X10*3/UL (ref 0–0.5)
IMM GRANULOCYTES NFR BLD AUTO: 0.5 % (ref 0–0.9)
KETONES UR STRIP.AUTO-MCNC: NEGATIVE MG/DL
LACTATE SERPL-SCNC: 2.1 MMOL/L (ref 0.4–2)
LEUKOCYTE ESTERASE UR QL STRIP.AUTO: NEGATIVE
LYMPHOCYTES # BLD AUTO: 1.73 X10*3/UL (ref 0.8–3)
LYMPHOCYTES NFR BLD AUTO: 15 %
MCH RBC QN AUTO: 30.3 PG (ref 26–34)
MCHC RBC AUTO-ENTMCNC: 33.8 G/DL (ref 32–36)
MCV RBC AUTO: 90 FL (ref 80–100)
MONOCYTES # BLD AUTO: 0.7 X10*3/UL (ref 0.05–0.8)
MONOCYTES NFR BLD AUTO: 6.1 %
NEUTROPHILS # BLD AUTO: 8.91 X10*3/UL (ref 1.6–5.5)
NEUTROPHILS NFR BLD AUTO: 77.6 %
NITRITE UR QL STRIP.AUTO: NEGATIVE
NRBC BLD-RTO: 0 /100 WBCS (ref 0–0)
PH UR STRIP.AUTO: 6.5 [PH]
PLATELET # BLD AUTO: 304 X10*3/UL (ref 150–450)
POTASSIUM SERPL-SCNC: 3.6 MMOL/L (ref 3.5–5.3)
PROT SERPL-MCNC: 7 G/DL (ref 6.4–8.2)
PROT UR STRIP.AUTO-MCNC: NEGATIVE MG/DL
RBC # BLD AUTO: 4.56 X10*6/UL (ref 4–5.2)
RBC # UR STRIP.AUTO: ABNORMAL /UL
RBC #/AREA URNS AUTO: ABNORMAL /HPF
SARS-COV-2 RNA RESP QL NAA+PROBE: NOT DETECTED
SODIUM SERPL-SCNC: 138 MMOL/L (ref 136–145)
SP GR UR STRIP.AUTO: >1.05
TSH SERPL-ACNC: 0.77 MIU/L (ref 0.44–3.98)
UROBILINOGEN UR STRIP.AUTO-MCNC: NORMAL MG/DL
WBC # BLD AUTO: 11.5 X10*3/UL (ref 4.4–11.3)
WBC #/AREA URNS AUTO: ABNORMAL /HPF

## 2024-05-08 PROCEDURE — 2500000004 HC RX 250 GENERAL PHARMACY W/ HCPCS (ALT 636 FOR OP/ED)

## 2024-05-08 PROCEDURE — 2500000004 HC RX 250 GENERAL PHARMACY W/ HCPCS (ALT 636 FOR OP/ED): Performed by: EMERGENCY MEDICINE

## 2024-05-08 PROCEDURE — 72141 MRI NECK SPINE W/O DYE: CPT

## 2024-05-08 PROCEDURE — 87635 SARS-COV-2 COVID-19 AMP PRB: CPT | Performed by: EMERGENCY MEDICINE

## 2024-05-08 PROCEDURE — 74177 CT ABD & PELVIS W/CONTRAST: CPT

## 2024-05-08 PROCEDURE — 36415 COLL VENOUS BLD VENIPUNCTURE: CPT | Performed by: EMERGENCY MEDICINE

## 2024-05-08 PROCEDURE — 80053 COMPREHEN METABOLIC PANEL: CPT | Performed by: EMERGENCY MEDICINE

## 2024-05-08 PROCEDURE — 2500000001 HC RX 250 WO HCPCS SELF ADMINISTERED DRUGS (ALT 637 FOR MEDICARE OP)

## 2024-05-08 PROCEDURE — 99285 EMERGENCY DEPT VISIT HI MDM: CPT

## 2024-05-08 PROCEDURE — 93308 TTE F-UP OR LMTD: CPT

## 2024-05-08 PROCEDURE — 96361 HYDRATE IV INFUSION ADD-ON: CPT

## 2024-05-08 PROCEDURE — 72141 MRI NECK SPINE W/O DYE: CPT | Performed by: RADIOLOGY

## 2024-05-08 PROCEDURE — 1100000001 HC PRIVATE ROOM DAILY

## 2024-05-08 PROCEDURE — 93005 ELECTROCARDIOGRAM TRACING: CPT

## 2024-05-08 PROCEDURE — 87040 BLOOD CULTURE FOR BACTERIA: CPT | Performed by: EMERGENCY MEDICINE

## 2024-05-08 PROCEDURE — 71046 X-RAY EXAM CHEST 2 VIEWS: CPT

## 2024-05-08 PROCEDURE — 96375 TX/PRO/DX INJ NEW DRUG ADDON: CPT | Mod: 59

## 2024-05-08 PROCEDURE — 99285 EMERGENCY DEPT VISIT HI MDM: CPT | Mod: 25

## 2024-05-08 PROCEDURE — 71275 CT ANGIOGRAPHY CHEST: CPT

## 2024-05-08 PROCEDURE — 2500000001 HC RX 250 WO HCPCS SELF ADMINISTERED DRUGS (ALT 637 FOR MEDICARE OP): Performed by: STUDENT IN AN ORGANIZED HEALTH CARE EDUCATION/TRAINING PROGRAM

## 2024-05-08 PROCEDURE — 74177 CT ABD & PELVIS W/CONTRAST: CPT | Mod: FOREIGN READ | Performed by: RADIOLOGY

## 2024-05-08 PROCEDURE — 99223 1ST HOSP IP/OBS HIGH 75: CPT | Performed by: NEUROLOGICAL SURGERY

## 2024-05-08 PROCEDURE — 84484 ASSAY OF TROPONIN QUANT: CPT

## 2024-05-08 PROCEDURE — 71046 X-RAY EXAM CHEST 2 VIEWS: CPT | Mod: FOREIGN READ | Performed by: RADIOLOGY

## 2024-05-08 PROCEDURE — 81001 URINALYSIS AUTO W/SCOPE: CPT | Performed by: EMERGENCY MEDICINE

## 2024-05-08 PROCEDURE — 2550000001 HC RX 255 CONTRASTS: Performed by: EMERGENCY MEDICINE

## 2024-05-08 PROCEDURE — 84443 ASSAY THYROID STIM HORMONE: CPT | Performed by: EMERGENCY MEDICINE

## 2024-05-08 PROCEDURE — 83605 ASSAY OF LACTIC ACID: CPT | Performed by: EMERGENCY MEDICINE

## 2024-05-08 PROCEDURE — 93308 TTE F-UP OR LMTD: CPT | Performed by: EMERGENCY MEDICINE

## 2024-05-08 PROCEDURE — 96365 THER/PROPH/DIAG IV INF INIT: CPT | Mod: 59

## 2024-05-08 PROCEDURE — 93010 ELECTROCARDIOGRAM REPORT: CPT

## 2024-05-08 PROCEDURE — 2500000004 HC RX 250 GENERAL PHARMACY W/ HCPCS (ALT 636 FOR OP/ED): Performed by: STUDENT IN AN ORGANIZED HEALTH CARE EDUCATION/TRAINING PROGRAM

## 2024-05-08 PROCEDURE — 85025 COMPLETE CBC W/AUTO DIFF WBC: CPT | Performed by: EMERGENCY MEDICINE

## 2024-05-08 PROCEDURE — 2500000006 HC RX 250 W HCPCS SELF ADMINISTERED DRUGS (ALT 637 FOR ALL PAYERS): Mod: MUE | Performed by: STUDENT IN AN ORGANIZED HEALTH CARE EDUCATION/TRAINING PROGRAM

## 2024-05-08 PROCEDURE — 71275 CT ANGIOGRAPHY CHEST: CPT | Mod: FOREIGN READ | Performed by: RADIOLOGY

## 2024-05-08 RX ORDER — GABAPENTIN 100 MG/1
100 CAPSULE ORAL 2 TIMES DAILY
Status: DISCONTINUED | OUTPATIENT
Start: 2024-05-08 | End: 2024-05-21 | Stop reason: HOSPADM

## 2024-05-08 RX ORDER — METHOCARBAMOL 500 MG/1
500 TABLET, FILM COATED ORAL 2 TIMES DAILY
Status: DISCONTINUED | OUTPATIENT
Start: 2024-05-08 | End: 2024-05-16

## 2024-05-08 RX ORDER — HYDROMORPHONE HYDROCHLORIDE 1 MG/ML
0.5 INJECTION, SOLUTION INTRAMUSCULAR; INTRAVENOUS; SUBCUTANEOUS ONCE
Status: COMPLETED | OUTPATIENT
Start: 2024-05-08 | End: 2024-05-08

## 2024-05-08 RX ORDER — OXYCODONE HYDROCHLORIDE 5 MG/1
10 TABLET ORAL EVERY 4 HOURS PRN
Status: DISCONTINUED | OUTPATIENT
Start: 2024-05-08 | End: 2024-05-13

## 2024-05-08 RX ORDER — AMOXICILLIN 250 MG
2 CAPSULE ORAL 2 TIMES DAILY
Status: DISCONTINUED | OUTPATIENT
Start: 2024-05-08 | End: 2024-05-21 | Stop reason: HOSPADM

## 2024-05-08 RX ORDER — HEPARIN SODIUM 5000 [USP'U]/ML
5000 INJECTION, SOLUTION INTRAVENOUS; SUBCUTANEOUS EVERY 8 HOURS
Status: COMPLETED | OUTPATIENT
Start: 2024-05-08 | End: 2024-05-12

## 2024-05-08 RX ORDER — ACETAMINOPHEN 325 MG/1
650 TABLET ORAL EVERY 6 HOURS PRN
Status: DISCONTINUED | OUTPATIENT
Start: 2024-05-08 | End: 2024-05-13

## 2024-05-08 RX ORDER — FENOFIBRATE 54 MG/1
48 TABLET ORAL DAILY
Status: DISCONTINUED | OUTPATIENT
Start: 2024-05-09 | End: 2024-05-21 | Stop reason: HOSPADM

## 2024-05-08 RX ORDER — VENLAFAXINE 75 MG/1
75 TABLET ORAL DAILY
Status: DISCONTINUED | OUTPATIENT
Start: 2024-05-09 | End: 2024-05-21 | Stop reason: HOSPADM

## 2024-05-08 RX ORDER — LISINOPRIL 10 MG/1
10 TABLET ORAL DAILY
Status: DISCONTINUED | OUTPATIENT
Start: 2024-05-09 | End: 2024-05-21 | Stop reason: HOSPADM

## 2024-05-08 RX ORDER — OXYCODONE HYDROCHLORIDE 5 MG/1
5 TABLET ORAL ONCE
Status: COMPLETED | OUTPATIENT
Start: 2024-05-08 | End: 2024-05-08

## 2024-05-08 RX ORDER — ACETAMINOPHEN 500 MG
5 TABLET ORAL NIGHTLY PRN
Status: DISCONTINUED | OUTPATIENT
Start: 2024-05-08 | End: 2024-05-21 | Stop reason: HOSPADM

## 2024-05-08 RX ORDER — NALOXONE HYDROCHLORIDE 0.4 MG/ML
0.2 INJECTION, SOLUTION INTRAMUSCULAR; INTRAVENOUS; SUBCUTANEOUS EVERY 5 MIN PRN
Status: DISCONTINUED | OUTPATIENT
Start: 2024-05-08 | End: 2024-05-21 | Stop reason: HOSPADM

## 2024-05-08 RX ORDER — HYDROMORPHONE HYDROCHLORIDE 1 MG/ML
0.2 INJECTION, SOLUTION INTRAMUSCULAR; INTRAVENOUS; SUBCUTANEOUS EVERY 4 HOURS PRN
Status: DISCONTINUED | OUTPATIENT
Start: 2024-05-08 | End: 2024-05-13

## 2024-05-08 RX ORDER — SIMVASTATIN 20 MG/1
40 TABLET, FILM COATED ORAL NIGHTLY
Status: DISCONTINUED | OUTPATIENT
Start: 2024-05-08 | End: 2024-05-21 | Stop reason: HOSPADM

## 2024-05-08 RX ORDER — CEVIMELINE HYDROCHLORIDE 30 MG/1
30 CAPSULE ORAL DAILY
Status: DISCONTINUED | OUTPATIENT
Start: 2024-05-09 | End: 2024-05-21 | Stop reason: HOSPADM

## 2024-05-08 RX ORDER — PREDNISONE 1 MG/1
3 TABLET ORAL DAILY
Status: DISCONTINUED | OUTPATIENT
Start: 2024-05-09 | End: 2024-05-21 | Stop reason: HOSPADM

## 2024-05-08 RX ORDER — MISOPROSTOL 200 UG/1
200 TABLET ORAL DAILY
Status: DISCONTINUED | OUTPATIENT
Start: 2024-05-09 | End: 2024-05-21 | Stop reason: HOSPADM

## 2024-05-08 RX ORDER — PANTOPRAZOLE SODIUM 40 MG/1
40 TABLET, DELAYED RELEASE ORAL
Status: DISCONTINUED | OUTPATIENT
Start: 2024-05-09 | End: 2024-05-21 | Stop reason: HOSPADM

## 2024-05-08 RX ORDER — PANTOPRAZOLE SODIUM 40 MG/10ML
40 INJECTION, POWDER, LYOPHILIZED, FOR SOLUTION INTRAVENOUS
Status: DISCONTINUED | OUTPATIENT
Start: 2024-05-09 | End: 2024-05-21 | Stop reason: HOSPADM

## 2024-05-08 RX ORDER — LORAZEPAM 0.5 MG/1
0.5 TABLET ORAL EVERY 6 HOURS PRN
Status: DISCONTINUED | OUTPATIENT
Start: 2024-05-08 | End: 2024-05-21 | Stop reason: HOSPADM

## 2024-05-08 RX ORDER — OXYCODONE HYDROCHLORIDE 5 MG/1
5 TABLET ORAL EVERY 4 HOURS PRN
Status: DISCONTINUED | OUTPATIENT
Start: 2024-05-08 | End: 2024-05-13

## 2024-05-08 RX ORDER — POLYETHYLENE GLYCOL 3350 17 G/17G
17 POWDER, FOR SOLUTION ORAL DAILY
Status: DISCONTINUED | OUTPATIENT
Start: 2024-05-09 | End: 2024-05-17

## 2024-05-08 RX ORDER — BUDESONIDE 3 MG/1
9 CAPSULE, COATED PELLETS ORAL DAILY
Status: DISCONTINUED | OUTPATIENT
Start: 2024-05-09 | End: 2024-05-21 | Stop reason: HOSPADM

## 2024-05-08 RX ORDER — AMLODIPINE BESYLATE 5 MG/1
5 TABLET ORAL DAILY
Status: DISCONTINUED | OUTPATIENT
Start: 2024-05-09 | End: 2024-05-21 | Stop reason: HOSPADM

## 2024-05-08 RX ADMIN — LORAZEPAM 1.5 MG: 0.5 TABLET ORAL at 22:28

## 2024-05-08 RX ADMIN — IOHEXOL 90 ML: 350 INJECTION, SOLUTION INTRAVENOUS at 15:09

## 2024-05-08 RX ADMIN — METHOCARBAMOL 500 MG: 500 TABLET ORAL at 22:29

## 2024-05-08 RX ADMIN — PIPERACILLIN SODIUM AND TAZOBACTAM SODIUM 3.38 G: 3; .375 INJECTION, SOLUTION INTRAVENOUS at 14:35

## 2024-05-08 RX ADMIN — SODIUM CHLORIDE, POTASSIUM CHLORIDE, SODIUM LACTATE AND CALCIUM CHLORIDE 1000 ML: 600; 310; 30; 20 INJECTION, SOLUTION INTRAVENOUS at 15:25

## 2024-05-08 RX ADMIN — HYDROMORPHONE HYDROCHLORIDE 0.5 MG: 1 INJECTION, SOLUTION INTRAMUSCULAR; INTRAVENOUS; SUBCUTANEOUS at 17:09

## 2024-05-08 RX ADMIN — HEPARIN SODIUM 5000 UNITS: 5000 INJECTION INTRAVENOUS; SUBCUTANEOUS at 22:29

## 2024-05-08 RX ADMIN — SIMVASTATIN 40 MG: 20 TABLET, FILM COATED ORAL at 22:28

## 2024-05-08 RX ADMIN — OXYCODONE HYDROCHLORIDE 5 MG: 5 TABLET ORAL at 13:29

## 2024-05-08 RX ADMIN — GABAPENTIN 100 MG: 100 CAPSULE ORAL at 22:28

## 2024-05-08 RX ADMIN — SENNOSIDES AND DOCUSATE SODIUM 2 TABLET: 8.6; 5 TABLET ORAL at 22:29

## 2024-05-08 RX ADMIN — OXYCODONE HYDROCHLORIDE 5 MG: 5 TABLET ORAL at 20:10

## 2024-05-08 RX ADMIN — SODIUM CHLORIDE, POTASSIUM CHLORIDE, SODIUM LACTATE AND CALCIUM CHLORIDE 1000 ML: 600; 310; 30; 20 INJECTION, SOLUTION INTRAVENOUS at 20:12

## 2024-05-08 RX ADMIN — HYDROMORPHONE HYDROCHLORIDE 0.2 MG: 1 INJECTION, SOLUTION INTRAMUSCULAR; INTRAVENOUS; SUBCUTANEOUS at 22:38

## 2024-05-08 SDOH — SOCIAL STABILITY: SOCIAL INSECURITY: ARE THERE ANY APPARENT SIGNS OF INJURIES/BEHAVIORS THAT COULD BE RELATED TO ABUSE/NEGLECT?: NO

## 2024-05-08 SDOH — HEALTH STABILITY: MENTAL HEALTH: HOW OFTEN DO YOU HAVE 6 OR MORE DRINKS ON ONE OCCASION?: NEVER

## 2024-05-08 SDOH — HEALTH STABILITY: MENTAL HEALTH: HOW OFTEN DO YOU HAVE A DRINK CONTAINING ALCOHOL?: 4 OR MORE TIMES A WEEK

## 2024-05-08 SDOH — HEALTH STABILITY: MENTAL HEALTH: HOW MANY STANDARD DRINKS CONTAINING ALCOHOL DO YOU HAVE ON A TYPICAL DAY?: 1 OR 2

## 2024-05-08 SDOH — SOCIAL STABILITY: SOCIAL NETWORK
IN A TYPICAL WEEK, HOW MANY TIMES DO YOU TALK ON THE PHONE WITH FAMILY, FRIENDS, OR NEIGHBORS?: MORE THAN THREE TIMES A WEEK

## 2024-05-08 SDOH — SOCIAL STABILITY: SOCIAL INSECURITY
WITHIN THE LAST YEAR, HAVE YOU BEEN KICKED, HIT, SLAPPED, OR OTHERWISE PHYSICALLY HURT BY YOUR PARTNER OR EX-PARTNER?: NO

## 2024-05-08 SDOH — SOCIAL STABILITY: SOCIAL INSECURITY: DO YOU FEEL UNSAFE GOING BACK TO THE PLACE WHERE YOU ARE LIVING?: NO

## 2024-05-08 SDOH — SOCIAL STABILITY: SOCIAL INSECURITY: WERE YOU ABLE TO COMPLETE ALL THE BEHAVIORAL HEALTH SCREENINGS?: YES

## 2024-05-08 SDOH — SOCIAL STABILITY: SOCIAL NETWORK: HOW OFTEN DO YOU ATTENT MEETINGS OF THE CLUB OR ORGANIZATION YOU BELONG TO?: MORE THAN 4 TIMES PER YEAR

## 2024-05-08 SDOH — SOCIAL STABILITY: SOCIAL INSECURITY: WITHIN THE LAST YEAR, HAVE YOU BEEN AFRAID OF YOUR PARTNER OR EX-PARTNER?: NO

## 2024-05-08 SDOH — ECONOMIC STABILITY: HOUSING INSECURITY
IN THE LAST 12 MONTHS, WAS THERE A TIME WHEN YOU DID NOT HAVE A STEADY PLACE TO SLEEP OR SLEPT IN A SHELTER (INCLUDING NOW)?: NO

## 2024-05-08 SDOH — SOCIAL STABILITY: SOCIAL INSECURITY
WITHIN THE LAST YEAR, HAVE TO BEEN RAPED OR FORCED TO HAVE ANY KIND OF SEXUAL ACTIVITY BY YOUR PARTNER OR EX-PARTNER?: NO

## 2024-05-08 SDOH — ECONOMIC STABILITY: INCOME INSECURITY: IN THE PAST 12 MONTHS, HAS THE ELECTRIC, GAS, OIL, OR WATER COMPANY THREATENED TO SHUT OFF SERVICE IN YOUR HOME?: NO

## 2024-05-08 SDOH — SOCIAL STABILITY: SOCIAL NETWORK
DO YOU BELONG TO ANY CLUBS OR ORGANIZATIONS SUCH AS CHURCH GROUPS UNIONS, FRATERNAL OR ATHLETIC GROUPS, OR SCHOOL GROUPS?: YES

## 2024-05-08 SDOH — HEALTH STABILITY: MENTAL HEALTH
STRESS IS WHEN SOMEONE FEELS TENSE, NERVOUS, ANXIOUS, OR CAN'T SLEEP AT NIGHT BECAUSE THEIR MIND IS TROUBLED. HOW STRESSED ARE YOU?: NOT AT ALL

## 2024-05-08 SDOH — HEALTH STABILITY: MENTAL HEALTH
HOW OFTEN DO YOU NEED TO HAVE SOMEONE HELP YOU WHEN YOU READ INSTRUCTIONS, PAMPHLETS, OR OTHER WRITTEN MATERIAL FROM YOUR DOCTOR OR PHARMACY?: NEVER

## 2024-05-08 SDOH — SOCIAL STABILITY: SOCIAL NETWORK: HOW OFTEN DO YOU ATTEND CHURCH OR RELIGIOUS SERVICES?: MORE THAN 4 TIMES PER YEAR

## 2024-05-08 SDOH — ECONOMIC STABILITY: FOOD INSECURITY: WITHIN THE PAST 12 MONTHS, YOU WORRIED THAT YOUR FOOD WOULD RUN OUT BEFORE YOU GOT MONEY TO BUY MORE.: NEVER TRUE

## 2024-05-08 SDOH — ECONOMIC STABILITY: TRANSPORTATION INSECURITY
IN THE PAST 12 MONTHS, HAS THE LACK OF TRANSPORTATION KEPT YOU FROM MEDICAL APPOINTMENTS OR FROM GETTING MEDICATIONS?: NO

## 2024-05-08 SDOH — SOCIAL STABILITY: SOCIAL NETWORK: ARE YOU MARRIED, WIDOWED, DIVORCED, SEPARATED, NEVER MARRIED, OR LIVING WITH A PARTNER?: WIDOWED

## 2024-05-08 SDOH — SOCIAL STABILITY: SOCIAL INSECURITY: DO YOU FEEL ANYONE HAS EXPLOITED OR TAKEN ADVANTAGE OF YOU FINANCIALLY OR OF YOUR PERSONAL PROPERTY?: NO

## 2024-05-08 SDOH — HEALTH STABILITY: MENTAL HEALTH: EXPERIENCED ANY OF THE FOLLOWING LIFE EVENTS: DEATH OF FAMILY/FRIEND

## 2024-05-08 SDOH — SOCIAL STABILITY: SOCIAL INSECURITY: HAVE YOU HAD ANY THOUGHTS OF HARMING ANYONE ELSE?: NO

## 2024-05-08 SDOH — SOCIAL STABILITY: SOCIAL INSECURITY: ARE YOU OR HAVE YOU BEEN THREATENED OR ABUSED PHYSICALLY, EMOTIONALLY, OR SEXUALLY BY ANYONE?: NO

## 2024-05-08 SDOH — SOCIAL STABILITY: SOCIAL INSECURITY: ABUSE: ADULT

## 2024-05-08 SDOH — ECONOMIC STABILITY: HOUSING INSECURITY: IN THE LAST 12 MONTHS, HOW MANY PLACES HAVE YOU LIVED?: 2

## 2024-05-08 SDOH — HEALTH STABILITY: PHYSICAL HEALTH: ON AVERAGE, HOW MANY DAYS PER WEEK DO YOU ENGAGE IN MODERATE TO STRENUOUS EXERCISE (LIKE A BRISK WALK)?: 0 DAYS

## 2024-05-08 SDOH — SOCIAL STABILITY: SOCIAL INSECURITY: WITHIN THE LAST YEAR, HAVE YOU BEEN HUMILIATED OR EMOTIONALLY ABUSED IN OTHER WAYS BY YOUR PARTNER OR EX-PARTNER?: NO

## 2024-05-08 SDOH — SOCIAL STABILITY: SOCIAL NETWORK
DO YOU BELONG TO ANY CLUBS OR ORGANIZATIONS SUCH AS CHURCH GROUPS UNIONS, FRATERNAL OR ATHLETIC GROUPS, OR SCHOOL GROUPS?: NO

## 2024-05-08 SDOH — HEALTH STABILITY: PHYSICAL HEALTH: ON AVERAGE, HOW MANY MINUTES DO YOU ENGAGE IN EXERCISE AT THIS LEVEL?: 0 MIN

## 2024-05-08 SDOH — SOCIAL STABILITY: SOCIAL INSECURITY: HAS ANYONE EVER THREATENED TO HURT YOUR FAMILY OR YOUR PETS?: NO

## 2024-05-08 SDOH — ECONOMIC STABILITY: FOOD INSECURITY: WITHIN THE PAST 12 MONTHS, THE FOOD YOU BOUGHT JUST DIDN'T LAST AND YOU DIDN'T HAVE MONEY TO GET MORE.: NEVER TRUE

## 2024-05-08 SDOH — SOCIAL STABILITY: SOCIAL INSECURITY: HAVE YOU HAD THOUGHTS OF HARMING ANYONE ELSE?: NO

## 2024-05-08 SDOH — ECONOMIC STABILITY: INCOME INSECURITY: IN THE LAST 12 MONTHS, WAS THERE A TIME WHEN YOU WERE NOT ABLE TO PAY THE MORTGAGE OR RENT ON TIME?: NO

## 2024-05-08 SDOH — SOCIAL STABILITY: SOCIAL NETWORK: HOW OFTEN DO YOU ATTENT MEETINGS OF THE CLUB OR ORGANIZATION YOU BELONG TO?: NEVER

## 2024-05-08 SDOH — ECONOMIC STABILITY: INCOME INSECURITY: HOW HARD IS IT FOR YOU TO PAY FOR THE VERY BASICS LIKE FOOD, HOUSING, MEDICAL CARE, AND HEATING?: NOT HARD AT ALL

## 2024-05-08 SDOH — ECONOMIC STABILITY: TRANSPORTATION INSECURITY
IN THE PAST 12 MONTHS, HAS LACK OF TRANSPORTATION KEPT YOU FROM MEETINGS, WORK, OR FROM GETTING THINGS NEEDED FOR DAILY LIVING?: NO

## 2024-05-08 SDOH — SOCIAL STABILITY: SOCIAL NETWORK: HOW OFTEN DO YOU GET TOGETHER WITH FRIENDS OR RELATIVES?: MORE THAN THREE TIMES A WEEK

## 2024-05-08 SDOH — SOCIAL STABILITY: SOCIAL INSECURITY: DOES ANYONE TRY TO KEEP YOU FROM HAVING/CONTACTING OTHER FRIENDS OR DOING THINGS OUTSIDE YOUR HOME?: NO

## 2024-05-08 ASSESSMENT — LIFESTYLE VARIABLES
SKIP TO QUESTIONS 9-10: 1
HOW OFTEN DO YOU HAVE 6 OR MORE DRINKS ON ONE OCCASION: NEVER
EVER HAD A DRINK FIRST THING IN THE MORNING TO STEADY YOUR NERVES TO GET RID OF A HANGOVER: NO
AUDIT-C TOTAL SCORE: 4
HOW MANY STANDARD DRINKS CONTAINING ALCOHOL DO YOU HAVE ON A TYPICAL DAY: 1 OR 2
HOW OFTEN DURING THE LAST YEAR HAVE YOU BEEN UNABLE TO REMEMBER WHAT HAPPENED THE NIGHT BEFORE BECAUSE YOU HAD BEEN DRINKING: NEVER
HAVE YOU OR SOMEONE ELSE BEEN INJURED AS A RESULT OF YOUR DRINKING: NO
AUDIT-C TOTAL SCORE: 4
HAVE PEOPLE ANNOYED YOU BY CRITICIZING YOUR DRINKING: NO
EVER FELT BAD OR GUILTY ABOUT YOUR DRINKING: NO
SUBSTANCE_ABUSE_PAST_12_MONTHS: NO
HAS A RELATIVE, FRIEND, DOCTOR, OR ANOTHER HEALTH PROFESSIONAL EXPRESSED CONCERN ABOUT YOUR DRINKING OR SUGGESTED YOU CUT DOWN: NO
SUBSTANCE_ABUSE_PAST_12_MONTHS: NO
HOW OFTEN DURING THE LAST YEAR HAVE YOU FOUND THAT YOU WERE NOT ABLE TO STOP DRINKING ONCE YOU HAD STARTED: NEVER
HOW OFTEN DURING THE LAST YEAR HAVE YOU HAD A FEELING OF GUILT OR REMORSE AFTER DRINKING: NEVER
AUDIT-C TOTAL SCORE: 4
AUDIT TOTAL SCORE: 4
TOTAL SCORE: 0
AUDIT-C TOTAL SCORE: 4
HOW OFTEN DO YOU HAVE A DRINK CONTAINING ALCOHOL: 4 OR MORE TIMES A WEEK
HOW OFTEN DURING THE LAST YEAR HAVE YOU NEEDED AN ALCOHOLIC DRINK FIRST THING IN THE MORNING TO GET YOURSELF GOING AFTER A NIGHT OF HEAVY DRINKING: NEVER
PRESCIPTION_ABUSE_PAST_12_MONTHS: NO
AUDIT TOTAL SCORE: 0
HOW MANY STANDARD DRINKS CONTAINING ALCOHOL DO YOU HAVE ON A TYPICAL DAY: 1 OR 2
HOW OFTEN DO YOU HAVE A DRINK CONTAINING ALCOHOL: 4 OR MORE TIMES A WEEK
PRESCIPTION_ABUSE_PAST_12_MONTHS: NO
AUDIT-C TOTAL SCORE: 4
HAVE YOU EVER FELT YOU SHOULD CUT DOWN ON YOUR DRINKING: NO
SKIP TO QUESTIONS 9-10: 1
AUDIT-C TOTAL SCORE: 4
HOW OFTEN DURING THE LAST YEAR HAVE YOU FAILED TO DO WHAT WAS NORMALLY EXPECTED FROM YOU BECAUSE OF DRINKING: NEVER
HOW OFTEN DO YOU HAVE 6 OR MORE DRINKS ON ONE OCCASION: NEVER
SKIP TO QUESTIONS 9-10: 1
SKIP TO QUESTIONS 9-10: 1

## 2024-05-08 ASSESSMENT — COGNITIVE AND FUNCTIONAL STATUS - GENERAL
DAILY ACTIVITIY SCORE: 24
MOBILITY SCORE: 23
PATIENT BASELINE BEDBOUND: NO
TURNING FROM BACK TO SIDE WHILE IN FLAT BAD: A LITTLE

## 2024-05-08 ASSESSMENT — PAIN DESCRIPTION - LOCATION
LOCATION: BACK

## 2024-05-08 ASSESSMENT — ACTIVITIES OF DAILY LIVING (ADL)
JUDGMENT_ADEQUATE_SAFELY_COMPLETE_DAILY_ACTIVITIES: YES
TOILETING: INDEPENDENT
JUDGMENT_ADEQUATE_SAFELY_COMPLETE_DAILY_ACTIVITIES: YES
WALKS IN HOME: NEEDS ASSISTANCE
HEARING - RIGHT EAR: FUNCTIONAL
PATIENT'S MEMORY ADEQUATE TO SAFELY COMPLETE DAILY ACTIVITIES?: YES
HEARING - LEFT EAR: FUNCTIONAL
BATHING: INDEPENDENT
ADEQUATE_TO_COMPLETE_ADL: YES
DRESSING YOURSELF: INDEPENDENT
FEEDING YOURSELF: INDEPENDENT
ADEQUATE_TO_COMPLETE_ADL: YES
PATIENT'S MEMORY ADEQUATE TO SAFELY COMPLETE DAILY ACTIVITIES?: YES
GROOMING: INDEPENDENT
ASSISTIVE_DEVICE: WHEELCHAIR

## 2024-05-08 ASSESSMENT — PATIENT HEALTH QUESTIONNAIRE - PHQ9
SUM OF ALL RESPONSES TO PHQ9 QUESTIONS 1 & 2: 0
2. FEELING DOWN, DEPRESSED OR HOPELESS: NOT AT ALL
1. LITTLE INTEREST OR PLEASURE IN DOING THINGS: NOT AT ALL

## 2024-05-08 ASSESSMENT — ENCOUNTER SYMPTOMS: TACHYCARDIA: 1

## 2024-05-08 ASSESSMENT — PAIN - FUNCTIONAL ASSESSMENT
PAIN_FUNCTIONAL_ASSESSMENT: 0-10

## 2024-05-08 ASSESSMENT — COLUMBIA-SUICIDE SEVERITY RATING SCALE - C-SSRS
6. HAVE YOU EVER DONE ANYTHING, STARTED TO DO ANYTHING, OR PREPARED TO DO ANYTHING TO END YOUR LIFE?: NO
1. IN THE PAST MONTH, HAVE YOU WISHED YOU WERE DEAD OR WISHED YOU COULD GO TO SLEEP AND NOT WAKE UP?: NO
2. HAVE YOU ACTUALLY HAD ANY THOUGHTS OF KILLING YOURSELF?: NO

## 2024-05-08 ASSESSMENT — PAIN SCALES - GENERAL
PAINLEVEL_OUTOF10: 6
PAINLEVEL_OUTOF10: 3
PAINLEVEL_OUTOF10: 6
PAINLEVEL_OUTOF10: 10 - WORST POSSIBLE PAIN
PAINLEVEL_OUTOF10: 8
PAINLEVEL_OUTOF10: 6

## 2024-05-08 ASSESSMENT — PAIN DESCRIPTION - PAIN TYPE: TYPE: CHRONIC PAIN

## 2024-05-08 NOTE — ED PROVIDER NOTES
HPI   Chief Complaint   Patient presents with    Arm Injury    Physician Referral       Patient is a 73-year-old female with history of spinal stenosis who presents with worsening left arm pain, paresthesias.  Patient states that she is supposed to have a laminectomy/spinal surgery on Monday.  States that she has been having this worsening left arm pain and neck pain.  States her neurosurgeon, Dr. Cheung, wanted her to come in for evaluation and MRI.  States these symptoms have been progressing for quite some time now, worsening in the last month.  She denies any recent trauma, loss of sensation, nausea, vomiting, chest pain, shortness of breath.    12 point review of systems was performed and is negative unless otherwise specified                          [place] Coma Scale Score: 15                     Patient History   Past Medical History:   Diagnosis Date    Anxiety     Arthritis     Depression     Does mobilize using walker     Encounter for electrocardiogram 03/04/2024    Sinus tachycardia  Otherwise normal EKG When compared with ECG of 26-Mar-2023 16:10,  Nonspecific T wave abnormalities no longer evident in Lateral leads    GERD (gastroesophageal reflux disease)     Glaucoma     History of blood transfusion     Transfused in 2016    History of recent steroid use     Rheumatoid arthritis    Hyperlipidemia     Hypertension     Managed by PCP    Joint pain     Lumbar disc disease     Osteoporosis     Rheumatoid arthritis (Multi)     Sjogren's syndrome (Multi)     Spinal stenosis     Lumbar spinal stenosis    Ulcerative colitis (Multi)     Vision loss     Wears glasses     Past Surgical History:   Procedure Laterality Date    APPENDECTOMY  08/02/2018    Appendectomy    CATARACT EXTRACTION  08/02/2018    Cataract Surgery    COLONOSCOPY      FEMUR FRACTURE SURGERY Right 2016    Femur Repair    FOOT SURGERY  08/02/2018    Foot Surgery    HYSTERECTOMY  08/02/2018    Hysterectomy    OTHER SURGICAL HISTORY   2018    Claw Toe Operation By Joint Fusion    OTHER SURGICAL HISTORY  2018    Hand Joint Arthrodesis    OTHER SURGICAL HISTORY  2018    Wrist Synovectomy Extensor Tendon Sheath, Resect Distal Ulna    OTHER SURGICAL HISTORY  2018    Oral Surgery Tooth Extraction [place] Tooth    SPINAL FUSION      TONSILLECTOMY  2018    Tonsillectomy    TOTAL SHOULDER ARTHROPLASTY      Left shoulder     Family History   Problem Relation Name Age of Onset    Heart disease Mother      Heart disease Father      Breast cancer Sister       Social History     Tobacco Use    Smoking status: Former     Current packs/day: 0.00     Types: Cigarettes     Quit date:      Years since quittin.3    Smokeless tobacco: Never   Vaping Use    Vaping status: Never Used   Substance Use Topics    Alcohol use: Yes     Alcohol/week: 2.0 standard drinks of alcohol     Types: 2 Glasses of wine per week     Comment: 2 glasses of wine daily    Drug use: Never       Physical Exam   ED Triage Vitals [24 1003]   Temperature Heart Rate Respirations BP   36.4 °C (97.6 °F) 97 18 114/83      Pulse Ox Temp Source Heart Rate Source Patient Position   100 % Skin -- --      BP Location FiO2 (%)     -- --       Physical Exam  Vitals and nursing note reviewed.   Constitutional:       Appearance: Normal appearance.   HENT:      Head: Normocephalic and atraumatic.      Right Ear: External ear normal.      Left Ear: External ear normal.      Nose: Nose normal.      Mouth/Throat:      Mouth: Mucous membranes are moist.      Pharynx: Oropharynx is clear.   Eyes:      Extraocular Movements: Extraocular movements intact.      Conjunctiva/sclera: Conjunctivae normal.      Pupils: Pupils are equal, round, and reactive to light.   Cardiovascular:      Rate and Rhythm: Normal rate and regular rhythm.      Pulses: Normal pulses.      Heart sounds: Normal heart sounds.   Pulmonary:      Effort: Pulmonary effort is normal. No respiratory  distress.      Breath sounds: Normal breath sounds. No wheezing.   Abdominal:      General: Abdomen is flat. Bowel sounds are normal.      Palpations: Abdomen is soft.      Tenderness: There is no abdominal tenderness. There is no guarding or rebound.   Musculoskeletal:         General: No deformity. Normal range of motion.      Cervical back: Normal range of motion and neck supple. No tenderness.      Right lower leg: No edema.      Left lower leg: No edema.   Skin:     General: Skin is warm and dry.      Capillary Refill: Capillary refill takes less than 2 seconds.   Neurological:      General: No focal deficit present.      Mental Status: She is alert and oriented to person, place, and time. Mental status is at baseline.      Motor: Weakness present.      Comments: +3/5 strength in LUE.    Psychiatric:         Mood and Affect: Mood normal.         Behavior: Behavior normal.         Thought Content: Thought content normal.         Judgment: Judgment normal.         ED Course & MDM   ED Course as of 05/08/24 1337   Wed May 08, 2024   1305 Troponin I Series, High Sensitivity (0, 1 HR) [SZ]   1336 ECG 12 lead  EKG interpreted by me.  5/8/2024 at 1305.  Sinus tachycardia 135 bpm.   QRS 88 QTc 459.  No ST elevation. [MC]      ED Course User Index  [MC] Seymour Elizabeth MD  [SZ] Bhupinder Simpson PA-C       Medical Decision Making  Patient presents with increasing left arm pain, paresthesias.  On exam she is well-appearing, afebrile, hemodynamically stable.  States the symptoms have been going for approximately a month now, but worsened recently.  States that since she is having a surgical procedure on Monday, her neurosurgeon wanted her to come in, for further investigation of the issue, as this may be an add-on to the current scheduled surgery.  On physical exam the patient is very well-appearing, afebrile, hemodynamically stable.  She is tachycardic in the 130s, however this does look like sinus tachycardia.   Patient denies any chest pain or shortness of breath.  States she usually runs in the low 100s, but 130s does seem high.  Physical exam does show some slight weakness of the left upper extremity.  However, patient states that this has been going on for quite some time, low suspicion for any CVA as the rest of the neurological exam is unremarkable.  Spoke to neurosurgery team, they recommended a Noncon cervical MRI.  Patient was given oxycodone for pain.  Will observe the patient's tachycardia, but will also order chest x-ray, EKG, as well as troponin and TSH.  Patient's EKG is sinus tach, no significant ST changes.  No STEMI.    Upon further evaluation, patient was endorsing some possible left lower quadrant abdominal pain.  Patient's white count did come back slightly elevated at 11, so did start sepsis workup on her, including fluids, antibiotics, and will do a chest abdomen pelvis CT.  At the end of my shift patient was still awaiting the imaging and the rest of her labs result.  She was in stable condition.    Patient staffed with Dr. Elizabeth.         Procedure  Procedures     Bhupinder Simpson PA-C  05/08/24 [id number]

## 2024-05-08 NOTE — ED TRIAGE NOTES
Patient presents to the Emergency department with a chief complaint of worsening arm pain and tingling. Patient is scheduled for a spinal surgery on Monday, but her surgery team recommended she get evaluated in the Emergency Department today. Patient denies any new injury or trauma. Patient states she is having issues stand up straight now.

## 2024-05-08 NOTE — ED PROCEDURE NOTE
Procedure    Performed by: Nina Byers MD  Authorized by: Seymour Elizabeth MD  Cardiac Indications: tachycardia                Procedure: Cardiac Ultrasound    Findings:   Views: parasternal long, parasternal short and apical four  The pericardial space was visualized and was NEGATIVE for a significant pericardial effusion.  Activity: Ventricular contractions were visualized.  LV: LV systolic function was NORMAL.  RV: RV size was NORMAL.    Impression:  Cardiac: The focused cardiac ultrasound exam was NORMAL.                   Nina Byers MD  Resident  05/08/24 5627

## 2024-05-08 NOTE — PROGRESS NOTES
Michelle Jack is a 73 y.o. female on day 0 of admission presenting with No Principal Problem: There is no principal problem currently on the Problem List. Please update the Problem List and refresh..    Assessment/Plan   Active Problems:  There are no active Hospital Problems.    .Patient was handed off to me from the previous team. For full history, physical, and prior ED course, please see previous provider note prior to patient handoff. This is an addendum to the record.    Briefly, this is a 73-year-old with history of spinal stenosis, presents to the emergency department for left arm pain and paresthesias.  CT imaging shows no PE or pneumonia. Renal hypodensities likely cysts, no hydronephrosis.  Chronic fractures of the rib. Chest x-ray shows no acute pathology.    Per NSGY recs, Please obtain MRI cervical spine without contrast  CBC, RFP, coag, T&S, UA, EKG, CXR  Hold ASA 81mg  Further recs pending imaging    Patient was tachy on sensation with leukocytosis, given LR, Zosyn.  Patient remains tachycardic, per further questioning the patient, reports she has been traveling and not drinking much for the past few days.  Given 1 LR bolus fluids. EKG shows sinus tachycardia, no signs of ischemia, no ST elevation, rate 135, normal intervals.    MRI shows: * Limited examination due to motion artifact  *Moderate/advanced degenerative changes without definite evidence of  central canal stenosis. *At C4/C5, there is marked limitation due to  motion artifact and canal stenosis is not excluded *No evidence of  discitis or osteomyelitis    EKG independently interpreted by me shows Sinus tachycardia, no signs of ischemia, no ST elevation, rate 135, normal intervals.    Patient admitted to [facility].     Anni Smith MD  Emergency Medicine PGY-1     Anni Smith MD

## 2024-05-08 NOTE — CONSULTS
Consults  Date of Service:  5/8/2024 Attending Provider:  Seymour Elizabeth MD     Reason for Consultation:  Michelle Jack is being seen today for a consult requested by Seymour Elizabeth MD for back pain, leg weakness.      Subjective   History of Present Illness:  Michelle is a 73 y.o. female with h/o HTN, HLD, GERD, RA, IBD, GI ulcer p/w back pain and BLE weakness (L >R) x 5mo. Patient had sudden onset back pain in 12/2023 and followed up with Dr. Cheung in March for evaluation. Patient has tried to get an MRI CS twice without success due to muscle spasms and severe pain. She continues to have back pain and is having increasing difficulty walking longer distances. She is using a walker.     Review of Systems   10 point ROS is obtained and negative except the ones mentioned in the HPI    Objective     Vitals:  Vitals:    05/08/24 1416   BP: 135/85   Pulse: (!) 140   Resp: 17   Temp:    SpO2: 98%       Exam:  Constitutional: No acute distress  Resp: breathing comfortably  Cardio: well perfused  GI: nondistended  MSK: full range of motion  Neuro: No acute distress, A&Ox3  Cranial Nerves II-XII: PERRL, EOMI, Face symmetric, Facial SILT, Palate/Tongue midline and symmetric, shoulder shrugs symmetric, hearing intact to finger rubs bilaterally  Motor:  RUE D4 T4 B4 HG4 IO3  RLE HF4+ KE/DF/PF 5  LUE D4- T4- B/HG 4 IO3  LLE HF 4- KE/DF/PF 5  BLE spasticity   Sensation: SILT throughout all extremities  DTRS: No Hoffmans or Clonus  Psych: appropriate mood  Skin: no obvious lesions    Medical History  Past Medical History:   Diagnosis Date    Anxiety     Arthritis     Depression     Does mobilize using walker     Encounter for electrocardiogram 03/04/2024    Sinus tachycardia  Otherwise normal EKG When compared with ECG of 26-Mar-2023 16:10,  Nonspecific T wave abnormalities no longer evident in Lateral leads    GERD (gastroesophageal reflux disease)     Glaucoma     History of blood transfusion     Transfused in 2016     History of recent steroid use     Rheumatoid arthritis    Hyperlipidemia     Hypertension     Managed by PCP    Joint pain     Lumbar disc disease     Osteoporosis     Rheumatoid arthritis (Multi)     Sjogren's syndrome (Multi)     Spinal stenosis     Lumbar spinal stenosis    Ulcerative colitis (Multi)     Vision loss     Wears glasses       Surgical History  Past Surgical History:   Procedure Laterality Date    APPENDECTOMY  08/02/2018    Appendectomy    CATARACT EXTRACTION  08/02/2018    Cataract Surgery    COLONOSCOPY      FEMUR FRACTURE SURGERY Right 2016    Femur Repair    FOOT SURGERY  08/02/2018    Foot Surgery    HYSTERECTOMY  08/02/2018    Hysterectomy    OTHER SURGICAL HISTORY  08/02/2018    Claw Toe Operation By Joint Fusion    OTHER SURGICAL HISTORY  08/02/2018    Hand Joint Arthrodesis    OTHER SURGICAL HISTORY  08/02/2018    Wrist Synovectomy Extensor Tendon Sheath, Resect Distal Ulna    OTHER SURGICAL HISTORY  08/02/2018    Oral Surgery Tooth Extraction [place] Tooth    SPINAL FUSION      TONSILLECTOMY  08/02/2018    Tonsillectomy    TOTAL SHOULDER ARTHROPLASTY      Left shoulder        Medications  Current Outpatient Medications   Medication Instructions    acetaminophen (Tylenol) 325 mg tablet oral, Every 4 hours PRN    amLODIPine (NORVASC) 5 mg, oral, Daily    aspirin 81 mg, oral, Daily, Last dose 4/26/24    budesonide EC (Entocort EC) 3 mg 24 hr capsule oral    budesonide ER (UCERIS) 9 mg, oral, Daily    calcium carbonate-vitamin D3 (Calcium 600 + D,3,) 600 mg-5 mcg (200 unit) tablet oral    celecoxib (CELEBREX) 200 mg, oral    cevimeline (Evoxac) 30 mg capsule oral    cholecalciferol (Vitamin D-3) 50 mcg (2,000 unit) capsule oral    cycloSPORINE (Restasis) 0.05 % ophthalmic emulsion 1 drop, Both Eyes, Daily    eluxadoline (VIBERZI ORAL) 50 mg, oral, As needed    fenofibrate (Tricor) 48 mg tablet oral    gabapentin (NEURONTIN) 100 mg, oral, 2 times daily    golimumab (Simponi) 100 mg/mL syringe 1  Dose, subcutaneous    lansoprazole (Prevacid) 30 mg DR capsule oral    lifitegrast (Xiidra) 5 % dropperette 1 drop, ophthalmic (eye), Daily    lisinopril 10 mg tablet oral    LORazepam (Ativan) 0.5 mg tablet oral    LORazepam (ATIVAN) 1.5 mg, oral, Nightly    methocarbamol (ROBAXIN) 500 mg, oral, 2 times daily    miSOPROStoL (Cytotec) 200 mcg tablet oral    multivit-mineral-iron-lutein tablet 1 tablet, oral, Daily    multivitamin with minerals iron-free (Centrum Silver) oral    omega-3 acid ethyl esters (Lovaza) 1 gram capsule oral    Orencia ClickJect 125 mg, subcutaneous, Once Weekly    predniSONE (DELTASONE) 3 mg, oral, Daily    Prolia 60 mg, subcutaneous, Every 6 months, Last dose Dec 2023    simvastatin (Zocor) 40 mg tablet oral    teriparatide (Forteo) injection subcutaneous    traMADol ER (ULTRAM-ER) 50 mg, oral, 2 times daily, Do not crush, chew, or split.    venlafaxine (Effexor) 75 mg tablet oral    venlafaxine 75 mg 24 hr tablet 1 tablet, oral, Daily        Diagnostic Results:    Lab Results   Component Value Date    WBC 11.5 (H) 05/08/2024    HGB 13.8 05/08/2024    HCT 40.8 05/08/2024    MCV 90 05/08/2024     05/08/2024     Lab Results   Component Value Date    CREATININE 0.62 05/08/2024    BUN 24 (H) 05/08/2024     05/08/2024    K 3.6 05/08/2024     05/08/2024    CO2 25 05/08/2024     Lab Results   Component Value Date    INR 0.9 07/27/2018    PROTIME 10.0 07/27/2018       === 07/27/18 ===    CT CHEST W CONTRAST    - Impression -  CHEST  1. No acute posttraumatic findings within the chest.  2. 4 mm low-density nodule within the left thyroid lobe.  ABDOMEN - PELVIS  1. No acute posttraumatic findings within the abdomen and pelvis.  2. Distended urinary bladder.  3. Degenerative disc disease at L4-L5 with grade 1 anterolisthesis of  L4 on L5.  I personally reviewed the images/study and I agree with the findings  as stated. This study was interpreted at [facility]  [place], [place].  === 04/23/24 ===    MRI - ONBASE SCAN      Assessment/Plan   Assessment:  Michelle is a 73 y.o. female with h/o HTN, HLD, GERD, RA, IBD, GI ulcer, prior L3-4 fusion (2023, Dr. Susie Edward), L4-5 lami and fusion ([facility] 2020) p/w back pain and BLE weakness, paresthesia (L >R) x 5mo.    Plan:  Please obtain MRI cervical spine without contrast  CBC, RFP, coag, T&S, UA, EKG, CXR  Hold ASA 81mg and Orencia  Further recs pending imaging    Sinan Bates MD  Plan was discussed with chief resident and attending Dr. Cheung. Plan not finalized until note signed by attending

## 2024-05-08 NOTE — H&P
History Of Present Illness  Michelle Jack is a 73 y.o. female with h/o HTN, HLD, GERD, RA, IBD, GI ulcer p/w back pain and BLE weakness (L >R) x 5mo. Patient had sudden onset back pain in 12/2023 and followed up with Dr. Cheung in March for evaluation. Patient has tried to get an MRI CS twice without success due to muscle spasms and severe pain. She continues to have back pain and is having increasing difficulty walking longer distances. She is using a walker.      Past Medical History  Past Medical History:   Diagnosis Date    Anxiety     Arthritis     Depression     Does mobilize using walker     Encounter for electrocardiogram 03/04/2024    Sinus tachycardia  Otherwise normal EKG When compared with ECG of 26-Mar-2023 16:10,  Nonspecific T wave abnormalities no longer evident in Lateral leads    GERD (gastroesophageal reflux disease)     Glaucoma     History of blood transfusion     Transfused in 2016    History of recent steroid use     Rheumatoid arthritis    Hyperlipidemia     Hypertension     Managed by PCP    Joint pain     Lumbar disc disease     Osteoporosis     Rheumatoid arthritis (Multi)     Sjogren's syndrome (Multi)     Spinal stenosis     Lumbar spinal stenosis    Ulcerative colitis (Multi)     Vision loss     Wears glasses       Surgical History  Past Surgical History:   Procedure Laterality Date    APPENDECTOMY  08/02/2018    Appendectomy    CATARACT EXTRACTION  08/02/2018    Cataract Surgery    COLONOSCOPY      FEMUR FRACTURE SURGERY Right 2016    Femur Repair    FOOT SURGERY  08/02/2018    Foot Surgery    HYSTERECTOMY  08/02/2018    Hysterectomy    OTHER SURGICAL HISTORY  08/02/2018    Claw Toe Operation By Joint Fusion    OTHER SURGICAL HISTORY  08/02/2018    Hand Joint Arthrodesis    OTHER SURGICAL HISTORY  08/02/2018    Wrist Synovectomy Extensor Tendon Sheath, Resect Distal Ulna    OTHER SURGICAL HISTORY  08/02/2018    Oral Surgery Tooth Extraction [place] Tooth    SPINAL FUSION       "TONSILLECTOMY  08/02/2018    Tonsillectomy    TOTAL SHOULDER ARTHROPLASTY      Left shoulder        Social History  She reports that she quit smoking about 37 years ago. Her smoking use included cigarettes. She has never used smokeless tobacco. She reports current alcohol use of about 2.0 standard drinks of alcohol per week. She reports that she does not use drugs.    Family History  Family History   Problem Relation Name Age of Onset    Heart disease Mother      Heart disease Father      Breast cancer Sister          Allergies  Morphine    Review of Systems    Negative except as noted above.     Physical Exam    RUE D4 T4 B4 HG4 IO3  RLE HF4+ KE/DF/PF 5  LUE D4- T4- B/HG 4 IO3  LLE HF 4- KE/DF/PF 5  BLE spasticity     Last Recorded Vitals  Blood pressure 106/81, pulse (!) 134, temperature 36.4 °C (97.6 °F), temperature source Skin, resp. rate 18, height 1.626 m (5' 4\"), weight 56.7 kg (125 lb), SpO2 96%.    Relevant Results      Assessment/Plan   Active Problems:  There are no active Hospital Problems.    Michelle Jack is a 73 y.o. female with h/o HTN, HLD, GERD, RA, IBD, GI ulcer p/w back pain and BLE weakness (L >R) x 5mo, MRI C6-7 severe stenosis w/ kyphotic deformity.     Plan    Floor  OR Mon 5/13 extension/revision to T10  Consult Asad in AM  ASA restart plan  PTOT  SCDs, SQH      Kranthi Bauer MD    "

## 2024-05-09 ENCOUNTER — APPOINTMENT (OUTPATIENT)
Dept: CARDIOLOGY | Facility: HOSPITAL | Age: 74
DRG: 454 | End: 2024-05-09
Payer: MEDICARE

## 2024-05-09 ENCOUNTER — APPOINTMENT (OUTPATIENT)
Dept: VASCULAR MEDICINE | Facility: HOSPITAL | Age: 74
DRG: 454 | End: 2024-05-09
Payer: MEDICARE

## 2024-05-09 LAB
AORTIC VALVE PEAK VELOCITY: 1.32 M/S
ATRIAL RATE: 135 BPM
ATRIAL RATE: 62 BPM
AV PEAK GRADIENT: 7 MMHG
AVA (PEAK VEL): 2.82 CM2
BNP SERPL-MCNC: 49 PG/ML (ref 0–99)
EJECTION FRACTION APICAL 4 CHAMBER: 57.1
HOLD SPECIMEN: NORMAL
LACTATE SERPL-SCNC: 3.1 MMOL/L (ref 0.4–2)
LACTATE SERPL-SCNC: 3.8 MMOL/L (ref 0.4–2)
LEFT ATRIUM VOLUME AREA LENGTH INDEX BSA: 24.8 ML/M2
LEFT VENTRICLE INTERNAL DIMENSION DIASTOLE: 4.22 CM (ref 3.5–6)
LEFT VENTRICULAR OUTFLOW TRACT DIAMETER: 2.1 CM
LV EJECTION FRACTION BIPLANE: 60 %
P AXIS: 0 DEGREES
PR INTERVAL: 136 MS
Q ONSET: 213 MS
Q ONSET: 216 MS
QRS COUNT: 22 BEATS
QRS COUNT: 22 BEATS
QRS DURATION: 82 MS
QRS DURATION: 88 MS
QT INTERVAL: 306 MS
QT INTERVAL: 372 MS
QTC CALCULATION(BAZETT): 459 MS
QTC CALCULATION(BAZETT): 549 MS
QTC FREDERICIA: 400 MS
QTC FREDERICIA: 482 MS
R AXIS: -29 DEGREES
R AXIS: -8 DEGREES
RIGHT VENTRICLE FREE WALL PEAK S': 18 CM/S
RIGHT VENTRICLE PEAK SYSTOLIC PRESSURE: 29.7 MMHG
T AXIS: -4 DEGREES
T AXIS: 25 DEGREES
T OFFSET: 366 MS
T OFFSET: 402 MS
VENTRICULAR RATE: 131 BPM
VENTRICULAR RATE: 135 BPM

## 2024-05-09 PROCEDURE — 36415 COLL VENOUS BLD VENIPUNCTURE: CPT

## 2024-05-09 PROCEDURE — 2500000001 HC RX 250 WO HCPCS SELF ADMINISTERED DRUGS (ALT 637 FOR MEDICARE OP): Performed by: STUDENT IN AN ORGANIZED HEALTH CARE EDUCATION/TRAINING PROGRAM

## 2024-05-09 PROCEDURE — 83605 ASSAY OF LACTIC ACID: CPT | Mod: 91

## 2024-05-09 PROCEDURE — 93306 TTE W/DOPPLER COMPLETE: CPT | Performed by: SPECIALIST

## 2024-05-09 PROCEDURE — 2500000004 HC RX 250 GENERAL PHARMACY W/ HCPCS (ALT 636 FOR OP/ED): Performed by: ANESTHESIOLOGY

## 2024-05-09 PROCEDURE — 2500000004 HC RX 250 GENERAL PHARMACY W/ HCPCS (ALT 636 FOR OP/ED)

## 2024-05-09 PROCEDURE — 36415 COLL VENOUS BLD VENIPUNCTURE: CPT | Performed by: STUDENT IN AN ORGANIZED HEALTH CARE EDUCATION/TRAINING PROGRAM

## 2024-05-09 PROCEDURE — 93970 EXTREMITY STUDY: CPT | Performed by: INTERNAL MEDICINE

## 2024-05-09 PROCEDURE — 83880 ASSAY OF NATRIURETIC PEPTIDE: CPT | Performed by: ANESTHESIOLOGY

## 2024-05-09 PROCEDURE — 93306 TTE W/DOPPLER COMPLETE: CPT

## 2024-05-09 PROCEDURE — 93970 EXTREMITY STUDY: CPT

## 2024-05-09 PROCEDURE — 1100000001 HC PRIVATE ROOM DAILY

## 2024-05-09 PROCEDURE — 2500000006 HC RX 250 W HCPCS SELF ADMINISTERED DRUGS (ALT 637 FOR ALL PAYERS): Mod: MUE | Performed by: STUDENT IN AN ORGANIZED HEALTH CARE EDUCATION/TRAINING PROGRAM

## 2024-05-09 PROCEDURE — 2500000004 HC RX 250 GENERAL PHARMACY W/ HCPCS (ALT 636 FOR OP/ED): Performed by: STUDENT IN AN ORGANIZED HEALTH CARE EDUCATION/TRAINING PROGRAM

## 2024-05-09 PROCEDURE — 83605 ASSAY OF LACTIC ACID: CPT | Performed by: STUDENT IN AN ORGANIZED HEALTH CARE EDUCATION/TRAINING PROGRAM

## 2024-05-09 RX ORDER — TIMOLOL MALEATE 5 MG/ML
1 SOLUTION/ DROPS OPHTHALMIC EVERY MORNING
COMMUNITY
Start: 2022-12-11

## 2024-05-09 RX ORDER — TALC
6 POWDER (GRAM) TOPICAL NIGHTLY PRN
COMMUNITY
Start: 2024-03-08

## 2024-05-09 RX ORDER — OMEPRAZOLE 40 MG/1
40 CAPSULE, DELAYED RELEASE ORAL 2 TIMES DAILY
COMMUNITY

## 2024-05-09 RX ORDER — SODIUM CHLORIDE, SODIUM LACTATE, POTASSIUM CHLORIDE, CALCIUM CHLORIDE 600; 310; 30; 20 MG/100ML; MG/100ML; MG/100ML; MG/100ML
75 INJECTION, SOLUTION INTRAVENOUS CONTINUOUS
Status: ACTIVE | OUTPATIENT
Start: 2024-05-09 | End: 2024-05-10

## 2024-05-09 RX ADMIN — BUDESONIDE 9 MG: 3 CAPSULE, COATED PELLETS ORAL at 09:43

## 2024-05-09 RX ADMIN — OXYCODONE HYDROCHLORIDE 10 MG: 5 TABLET ORAL at 18:10

## 2024-05-09 RX ADMIN — HEPARIN SODIUM 5000 UNITS: 5000 INJECTION INTRAVENOUS; SUBCUTANEOUS at 05:20

## 2024-05-09 RX ADMIN — SODIUM CHLORIDE 1000 ML: 9 INJECTION, SOLUTION INTRAVENOUS at 14:50

## 2024-05-09 RX ADMIN — GABAPENTIN 100 MG: 100 CAPSULE ORAL at 20:14

## 2024-05-09 RX ADMIN — HEPARIN SODIUM 5000 UNITS: 5000 INJECTION INTRAVENOUS; SUBCUTANEOUS at 22:00

## 2024-05-09 RX ADMIN — OXYCODONE HYDROCHLORIDE 10 MG: 5 TABLET ORAL at 13:43

## 2024-05-09 RX ADMIN — LISINOPRIL 10 MG: 10 TABLET ORAL at 09:44

## 2024-05-09 RX ADMIN — PANTOPRAZOLE SODIUM 40 MG: 40 TABLET, DELAYED RELEASE ORAL at 09:50

## 2024-05-09 RX ADMIN — GABAPENTIN 100 MG: 100 CAPSULE ORAL at 09:44

## 2024-05-09 RX ADMIN — VENLAFAXINE 75 MG: 75 TABLET ORAL at 09:43

## 2024-05-09 RX ADMIN — MISOPROSTOL 200 MCG: 200 TABLET ORAL at 09:43

## 2024-05-09 RX ADMIN — SODIUM CHLORIDE, POTASSIUM CHLORIDE, SODIUM LACTATE AND CALCIUM CHLORIDE 75 ML/HR: 600; 310; 30; 20 INJECTION, SOLUTION INTRAVENOUS at 16:56

## 2024-05-09 RX ADMIN — LORAZEPAM 1.5 MG: 0.5 TABLET ORAL at 20:13

## 2024-05-09 RX ADMIN — FENOFIBRATE 54 MG: 54 TABLET ORAL at 09:43

## 2024-05-09 RX ADMIN — SIMVASTATIN 40 MG: 20 TABLET, FILM COATED ORAL at 20:14

## 2024-05-09 RX ADMIN — HEPARIN SODIUM 5000 UNITS: 5000 INJECTION INTRAVENOUS; SUBCUTANEOUS at 13:43

## 2024-05-09 RX ADMIN — AMLODIPINE BESYLATE 5 MG: 5 TABLET ORAL at 09:44

## 2024-05-09 RX ADMIN — CEVIMELINE HYDROCHLORIDE 30 MG: 30 CAPSULE ORAL at 09:43

## 2024-05-09 RX ADMIN — OXYCODONE HYDROCHLORIDE 10 MG: 5 TABLET ORAL at 05:20

## 2024-05-09 RX ADMIN — PREDNISONE 3 MG: 1 TABLET ORAL at 09:43

## 2024-05-09 RX ADMIN — METHOCARBAMOL 500 MG: 500 TABLET ORAL at 20:13

## 2024-05-09 RX ADMIN — METHOCARBAMOL 500 MG: 500 TABLET ORAL at 09:44

## 2024-05-09 RX ADMIN — OXYCODONE HYDROCHLORIDE 10 MG: 5 TABLET ORAL at 09:44

## 2024-05-09 ASSESSMENT — PAIN SCALES - GENERAL
PAINLEVEL_OUTOF10: 7
PAINLEVEL_OUTOF10: 8
PAINLEVEL_OUTOF10: 3
PAINLEVEL_OUTOF10: 7
PAINLEVEL_OUTOF10: 8
PAINLEVEL_OUTOF10: 8

## 2024-05-09 ASSESSMENT — COGNITIVE AND FUNCTIONAL STATUS - GENERAL
MOBILITY SCORE: 20
CLIMB 3 TO 5 STEPS WITH RAILING: A LOT
STANDING UP FROM CHAIR USING ARMS: A LITTLE
WALKING IN HOSPITAL ROOM: A LITTLE

## 2024-05-09 ASSESSMENT — PAIN DESCRIPTION - LOCATION
LOCATION: BACK

## 2024-05-09 ASSESSMENT — ENCOUNTER SYMPTOMS
ACTIVITY CHANGE: 1
DIARRHEA: 0
PALPITATIONS: 0
BLOOD IN STOOL: 0
SHORTNESS OF BREATH: 0
ABDOMINAL PAIN: 0
BACK PAIN: 1
DIZZINESS: 0
CHEST TIGHTNESS: 0
NECK PAIN: 1
COUGH: 0
WEAKNESS: 1

## 2024-05-09 ASSESSMENT — PAIN - FUNCTIONAL ASSESSMENT
PAIN_FUNCTIONAL_ASSESSMENT: 0-10

## 2024-05-09 NOTE — PROGRESS NOTES
Occupational Therapy    Communication Note    Missed Visit Reason:  (Pt currently at baseline prior to admission. Pt reported MOD I FWW past 5 months with increased pain. Pt reported performing toileting and functional mobility supervision with staff; OT educated Pt on importance of mobility and safety. Will reattempt eval post op.)      05/09/24 at 3:39 PM   Irina Mas OT   Rehab Office: [phone]

## 2024-05-09 NOTE — PROGRESS NOTES
Care Transitions Progress Note:  Plan per medical team: plan for OR on Monday 5/13  Team Members Present: NP: MD: TCC: CATHERINE  Dispo; tbd  Status: inpatient  Payor: medicare  Barriers:none  Adod: 6 days

## 2024-05-09 NOTE — PROGRESS NOTES
"Michelle Jack is a 73 y.o. female on day 1 of admission presenting with Cervical myelopathy (Multi).    Subjective     No events overnight.       Objective     Physical Exam    RUE D4 T4 B4 HG4 IO3  RLE HF4+ KE/DF/PF 5  LUE D4- T4- B/HG 4 IO3  LLE HF 4- KE/DF/PF 5  BLE spasticity    Last Recorded Vitals  Blood pressure 135/82, pulse (!) 125, temperature 36.6 °C (97.9 °F), temperature source Temporal, resp. rate 16, height 1.626 m (5' 4\"), weight 57.4 kg (126 lb 8 oz), SpO2 94%.  Intake/Output last 3 Shifts:  No intake/output data recorded.    Relevant Results                             Assessment/Plan   Principal Problem:    Cervical myelopathy (Multi)    Michelle Jack is a 73 y.o. female with h/o HTN, HLD, GERD, RA, IBD, GI ulcer p/w back pain and BLE weakness (L >R) x 5mo, MRI C6-7 severe stenosis w/ kyphotic deformity.     Plan:    Floor  OR Mon 5/13 extension/revision to T10  ASA restart plan  Sidagam consult today  DVT US, TTE  PTOT  SCDs, SQH           Kranthi Bauer MD      "

## 2024-05-09 NOTE — PROGRESS NOTES
Pharmacy Admission Order Reconciliation Review    Michelle Jack is a 73 y.o. female admitted for Cervical myelopathy (Multi). Pharmacy reviewed the patient's unreconciled admission medications.    Prior to admission medications that were reviewed and acted on by the pharmacist include:  Melatonin  Omeprazole    These medications have been reconciled.     Any other unreconcilied medications have been addressed and will be ordered or held by the patient's medical team. Medications addressed by the pharmacist may be added or changed by the patient's medical team at any time.    Medina Smith, PharmD  Transitions of Care Pharmacist  [facility]  Please reach out via Secure Chat for questions

## 2024-05-09 NOTE — PROGRESS NOTES
Pharmacy Medication History Review    Michelle Jack is a 73 y.o. female admitted for Cervical myelopathy (Multi). Pharmacy reviewed the patient's [id number] medications and allergies for accuracy.    The list below reflects the updated PTA list. Comments regarding how patient may be taking medications differently can be found in the Admit Orders Activity  Prior to Admission Medications   Prescriptions Last Dose Informant Patient Reported? Taking?   LORazepam (Ativan) 1 mg tablet 5/8/2024 Self Yes Yes   Sig: Take 1.5 tablets (1.5 mg) by mouth once daily at bedtime.   Orencia ClickJect 125 mg/mL auto-injector auto-injection Past Month at om hold since ealy April Self Yes Yes   Sig: Inject 1 mL (125 mg) under the skin 1 (one) time per week.   acetaminophen (Tylenol) 325 mg tablet Past Week Self Yes Yes   Sig: Take by mouth every 4 hours if needed.   amLODIPine (Norvasc) 5 mg tablet 5/8/2024 Self Yes Yes   Sig: Take 1 tablet (5 mg) by mouth once daily.   aspirin 81 mg EC tablet 4/26/2024 at on hold since 4/26/24 Self Yes Yes   Sig: Take 1 tablet (81 mg) by mouth once daily. Last dose 4/26/24   budesonide EC (Entocort EC) 3 mg 24 hr capsule Unknown Self Yes Yes   Sig: Take 3 capsules (9 mg) by mouth once daily.   calcium carbonate-vitamin D3 (Calcium 600 + D,3,) 600 mg-5 mcg (200 unit) tablet Past Week at on hold Self Yes Yes   Sig: Take 1 tablet by mouth once daily.   celecoxib (CeleBREX) 200 mg capsule 4/26/2024 Self Yes Yes   Sig: Take 1 capsule (200 mg) by mouth.   cevimeline (Evoxac) 30 mg capsule Unknown Self Yes Yes   Sig: Take 1 capsule (30 mg) by mouth every 12 hours.   cholecalciferol (Vitamin D-3) 50 mcg (2,000 unit) capsule Past Week at on hold Self Yes Yes   Sig: Take 1 capsule (50 mcg) by mouth early in the morning..   cycloSPORINE (Restasis) 0.05 % ophthalmic emulsion 5/8/2024 Self Yes Yes   Sig: Administer 1 drop into both eyes once daily at bedtime.   denosumab (Prolia) 60 mg/mL syringe More  than a month Self Yes Yes   Sig: Inject 1 mL (60 mg) under the skin every 6 months. Last dose Dec 2023   fenofibrate (Tricor) 48 mg tablet 5/8/2024 Self Yes Yes   Sig: Take 1 tablet (48 mg) by mouth once daily.   gabapentin (Neurontin) 100 mg capsule 5/8/2024 Self Yes Yes   Sig: Take 1 capsule (100 mg) by mouth 2 times a day.   lisinopril 10 mg tablet 5/8/2024 Self Yes Yes   Sig: Take 1 tablet (10 mg) by mouth once daily.   melatonin 3 mg tablet Past Week Self Yes Yes   Sig: Take 2 tablets (6 mg) by mouth as needed at bedtime for sleep.   methocarbamol (Robaxin) 500 mg tablet 5/8/2024 Self Yes Yes   Sig: Take 1 tablet (500 mg) by mouth 3 times a day as needed for muscle spasms.   miSOPROStoL (Cytotec) 200 mcg tablet 5/8/2024 Self Yes Yes   Sig: Take 1 tablet (200 mcg) by mouth 2 times a day.   multivitamin with minerals iron-free (Centrum Silver) Past Week at on hold Self Yes Yes   Sig: Take by mouth.   omega-3 acid ethyl esters (Lovaza) 1 gram capsule 5/8/2024 Self Yes Yes   Sig: Take 1 capsule (1 g) by mouth once daily.   omeprazole (PriLOSEC) 40 mg DR capsule 5/8/2024 Self Yes Yes   Sig: Take 1 capsule (40 mg) by mouth 2 times a day.   predniSONE (Deltasone) 1 mg tablet 5/8/2024 Self Yes Yes   Sig: Take 3 tablets (3 mg) by mouth once daily.   simvastatin (Zocor) 40 mg tablet 5/8/2024 Self Yes Yes   Sig: Take 1 tablet (40 mg) by mouth once daily.   timolol (Timoptic) 0.5 % ophthalmic solution 5/8/2024 Self Yes Yes   Sig: Administer 1 drop into both eyes once daily in the morning.   traMADol (Ultram) 50 mg tablet Past Week Self Yes Yes   Sig: Take 1 tablet (50 mg) by mouth 2 times a day as needed (pain). Do not crush, chew, or split.   venlafaxine 75 mg 24 hr tablet 5/8/2024 Self Yes Yes   Sig: Take 1 tablet (75 mg) by mouth once daily.      Facility-Administered Medications: None        The list below reflects the updated allergy list. Please review each documented allergy for additional clarification and  justification.  Allergies  Reviewed by Nidhi Nascimento, RN on 5/8/2024        Severity Reactions Comments    Morphine Medium Swelling             Patient accepts M2B at discharge. [facility].    Sources used to complete the med history include   Patient Interview - good historian able to confirm medications from verbally presented list and independently state directions for administration and provide supplemental medication history  Admission MedRec Grid  [place]ARRS - gabapentin 100mg LF: 4/24/24, #60, 30DS; Tramadol 50mg LF: 4/9/24, #60, 30DS  EPIC medication dispense report    Below are additional concerns with the patient's PTA list.  Currently tapering down prednisone to 3mg once daily for the past 2-3 week (previously on 5mg daily) with planes to resume 5 mg dily dosing after surgery  Orencia on hold since early April  All supplements on hold for past week prior to patient's scheduled surgery on Monday 5/14/24  No recent fill history for lisinopril      Shani Stinson, PharmD   Transitions of Care Pharmacist  [facility]  Please reach out via Secure Chat for questions, or if no response call [employer] or [employer] [facility]

## 2024-05-09 NOTE — CARE PLAN
The patient's goals for the shift include pain control    The clinical goals for the shift include pain control    Pain control remains focus for pt.

## 2024-05-09 NOTE — PROGRESS NOTES
Physical Therapy                 Therapy Communication Note    Patient Name: Michelle Jack  MRN: [id number]  Today's Date: 5/9/2024     Discipline: Physical Therapy    Missed Visit Reason: Missed Visit Reason: Other (Comment)    Missed Time: Attempt    Comment:  PT eval orders received; pt pending OR Mon 5/13 extension/revision to T10. Met with pt and sister at bedside. Patient reports using FWW for ambulation last ~ 5 months and increased pain. Patient reports going to bathroom in room using FWW with supervision of staff. Patient agreeable for further PT evaluation following procedure. PT found chair and placed in pt room. Encouraged mobility and OOB in chair and pt in agreement. Will follow up for PT eval and rec following OR.

## 2024-05-09 NOTE — CONSULTS
Reason For Consult  Perioperative evaluation for elective lumbar decompression and fusion with revision of prior fusion    History Of Present Illness  Michelle Jack is a 73 y.o. female presenting with back pain, leg weakness.     Patient accompanied by two sisters at bedside who participate in patient's history discussion. Pt's residence is in [place], however, she has been living with her sisters (one in [place] and one in [place]) over the past several months. She has followed with [facility] in [place] recently.    Patient has hx of L4-5 decompression and fusion (9/2020) with L3-4 extension of prior fusion (3/2023. She reports being very functional with minimal pain following her most recent surgery up until 12/2023 when she developed significant lower back pain. She now is requiring walker for ambulation around home and wheelchair when she is in public. She is planned for elective lumbar decompression and fusion with revision of prior fusion 5/13/24 with Dr. Cheung. Pt presented to ED 5/8 for worsening back pain as well as neck pain and paresthesias. She had MRI 5/8 which showed C6-7 severe stenosis w/ kyphotic deformity. In the ED she was found to be tachycardic to the 130-140s.    She has recent admission 3/4-3/8/24 for colitis and 2/12-2/14 for Bartholin gland abscess/labial cellulitis.    On my eval, patient reports that prior to December when she had decompensated back pain, she was ambulatory without assistance and was performing all ADLs and iADLs. She reports frequent travel around [facility] and rehab centers, walking to and from the parking lots, for her husbands cancer care at [facility]. She denies any exertional chest pains or shortness of breath with this activity.     At baseline she has the following medical issues:   -Rheumatoid arthritis (last abatacept ~6 weeks ago; follows with Rheumatology in [place])  -Lumbar spinal stenosis s/p L3-4 fusion 3/2023, laminectomy with fusion  9/2020  -PUD  -HTN  -HLD  -Depression  -Ulcerative colitis    Patient denies any history of CHF, CAD, orthopnea, PND, arrhythmia, DVT/PE, COPD or wheezing or cough     Past Medical History  She has a past medical history of Anxiety, Arthritis, Depression, Does mobilize using walker, Encounter for electrocardiogram (03/04/2024), GERD (gastroesophageal reflux disease), Glaucoma, History of blood transfusion, History of recent steroid use, Hyperlipidemia, Hypertension, Joint pain, Lumbar disc disease, Osteoporosis, Rheumatoid arthritis (Multi), Sjogren's syndrome (Multi), Spinal stenosis, Ulcerative colitis (Multi), and Vision loss.    Surgical History  She has a past surgical history that includes Femur fracture surgery (Right, 2016); Foot surgery (08/02/2018); Other surgical history (08/02/2018); Other surgical history (08/02/2018); Other surgical history (08/02/2018); Cataract extraction (08/02/2018); Tonsillectomy (08/02/2018); Other surgical history (08/02/2018); Appendectomy (08/02/2018); Hysterectomy (08/02/2018); Spinal fusion; Total shoulder arthroplasty; and Colonoscopy.     Social History  She reports that she quit smoking about 37 years ago. Her smoking use included cigarettes. She has never used smokeless tobacco. She reports current alcohol use of about 2.0 standard drinks of alcohol per week. She reports that she does not use drugs.    Family History  Family History   Problem Relation Name Age of Onset    Heart disease Mother      Heart disease Father      Breast cancer Sister          Allergies  Morphine    Review of Systems  Review of Systems   Constitutional:  Positive for activity change.   Respiratory:  Negative for cough, chest tightness and shortness of breath.    Cardiovascular:  Negative for chest pain, palpitations and leg swelling.   Gastrointestinal:  Negative for abdominal pain, blood in stool and diarrhea.   Musculoskeletal:  Positive for back pain and neck pain.   Neurological:  Positive  "for weakness. Negative for dizziness.         Physical Exam  NAD  Dry mucous membranes, no carotid bruits   Tachycardic otherwise regular; no murmurs  CTAB  No peripheral edema  No abdominal tenderness, soft  A&O x3     Last Recorded Vitals  Blood pressure 123/83, pulse (!) 119, temperature 36.2 °C (97.2 °F), temperature source Temporal, resp. rate 18, height 1.626 m (5' 4\"), weight 57.4 kg (126 lb 8 oz), SpO2 93%.    Relevant Results   Latest Reference Range & Units 05/09/24 11:38   Lactate 0.4 - 2.0 mmol/L 3.8 (H)      Latest Reference Range & Units 05/08/24 15:44   Color, Urine Light-Yellow, Yellow, Dark-Yellow  Colorless !   Appearance, Urine Clear  Clear   Specific Gravity, Urine 1.005 - 1.035  >1.050 !   pH, Urine 5.0, 5.5, 6.0, 6.5, 7.0, 7.5, 8.0  6.5   Protein, Urine NEGATIVE, 10 (TRACE), 20 (TRACE) mg/dL NEGATIVE   Glucose, Urine Normal mg/dL Normal   Blood, Urine NEGATIVE  0.03 (TRACE) !   Ketones, Urine NEGATIVE mg/dL NEGATIVE   Bilirubin, Urine NEGATIVE  NEGATIVE   Urobilinogen, Urine Normal mg/dL Normal   Nitrite, Urine NEGATIVE  NEGATIVE   Leukocyte Esterase, Urine NEGATIVE  NEGATIVE      Latest Reference Range & Units 05/08/24 13:20   LEUKOCYTES (10*3/UL) IN BLOOD BY AUTOMATED COUNT, [place] 4.4 - 11.3 x10*3/uL 11.5 (H)   nRBC 0.0 - 0.0 /100 WBCs 0.0   ERYTHROCYTES (10*6/UL) IN BLOOD BY AUTOMATED COUNT, [place] 4.00 - 5.20 x10*6/uL 4.56   HEMOGLOBIN 12.0 - 16.0 g/dL 13.8   HEMATOCRIT 36.0 - 46.0 % 40.8   MCV 80 - 100 fL 90   MCH 26.0 - 34.0 pg 30.3   MCHC 32.0 - 36.0 g/dL 33.8   RED CELL DISTRIBUTION WIDTH 11.5 - 14.5 % 14.3   PLATELETS (10*3/UL) IN BLOOD AUTOMATED COUNT, [place] 150 - 450 x10*3/uL 304   NEUTROPHILS/100 LEUKOCYTES IN BLOOD BY AUTOMATED COUNT, [place] 40.0 - 80.0 % 77.6   Immature Granulocytes %, Automated 0.0 - 0.9 % 0.5   Lymphocytes % 13.0 - 44.0 % 15.0   Monocytes % 2.0 - 10.0 % 6.1   Eosinophils % 0.0 - 6.0 % 0.4   Basophils % 0.0 - 2.0 % 0.4   NEUTROPHILS (10*3/UL) IN BLOOD " BY AUTOMATED COUNT, Bengali 1.60 - 5.50 x10*3/uL 8.91 (H)   Immature Granulocytes Absolute, Automated 0.00 - 0.50 x10*3/uL 0.06   Lymphocytes Absolute 0.80 - 3.00 x10*3/uL 1.73   Monocytes Absolute 0.05 - 0.80 x10*3/uL 0.70   Eosinophils Absolute 0.00 - 0.40 x10*3/uL 0.05   Basophils Absolute 0.00 - 0.10 x10*3/uL 0.05      Latest Reference Range & Units 05/08/24 13:20   Thyroid Stimulating Hormone 0.44 - 3.98 mIU/L 0.77   GLUCOSE 74 - 99 mg/dL 125 (H)      Latest Reference Range & Units 05/08/24 13:20   GLUCOSE 74 - 99 mg/dL 125 (H)   SODIUM 136 - 145 mmol/L 138   POTASSIUM 3.5 - 5.3 mmol/L 3.6   CHLORIDE 98 - 107 mmol/L 102   Bicarbonate 21 - 32 mmol/L 25   Anion Gap 10 - 20 mmol/L 15   Blood Urea Nitrogen 6 - 23 mg/dL 24 (H)   Creatinine 0.50 - 1.05 mg/dL 0.62   EGFR >60 mL/min/1.73m*2 >90   Calcium 8.6 - 10.6 mg/dL 9.5   Albumin 3.4 - 5.0 g/dL 3.8   Alkaline Phosphatase 33 - 136 U/L 38   ALT 7 - 45 U/L 15   AST 9 - 39 U/L 12   Bilirubin Total 0.0 - 1.2 mg/dL 0.3   Total Protein 6.4 - 8.2 g/dL 7.0     Lower extremity US 5/9/24:  Right Lower Venous: No evidence of acute deep vein thrombus visualized in the right lower extremity.     Left Lower Venous: No evidence of acute deep vein thrombus visualized in the left lower extremity.    Assessment/Plan   74 yo female presenting with acute on chronic back pain, leg weakness ongoing since 12/2023 with worsened neck pain and paresthesias.     Surgery- lumbar decompression and fusion with revision of prior fusion     Timing- elective     Patient has hx of L4-5 decompression and fusion (9/2020) with L3-4 extension of prior fusion (3/2023). She had prior surgery at [facility] in [place] and now is scheduled for surgery with Dr. Cheung. She reports debilitating lower back pain which has greatly decreased her functional ability since 12/2023. Prior to this and following her surgery 3/2023, patient reports that she was able to walk prolonged distances, go up and down  stairs, and perform all ADLs without shortness of breath or chest pain.    She has no PMH of CHF/COPD/CAD/DVT/PE/CVA and denies any cough, shortness of breath, chest pain. She does not have CKD or diabetes. She has RA and ulcerative colitis. She is on chronic prednisone (3mg daily) and reports no recent flairs. She last took her biologic for RA 6 weeks ago.     She did have tachycardia upon arrival to ED with rates 130-140s. I personally reviewed her ECG which appears to be sinus tachycardia. There are no pathologic q waves or ST segment changes. She also was found to have UA with specific gravity 1.050 with lactate 2.1->3.8. I personally reviewed patient's CT PE which is negative for PE. I also reviewed patient's LE dopplers which is negative for DVT. I suspect that patient's tachycardia etiology is dehydration. She did have blood cultures drawn. Pt does have immunocompromised status but denies recent fevers or illness.     Recommendations:  -will follow up echocardiogram to eval for any valvular dysfunction or heart failure  -check BNP to eval for any heart strain  -agree with fluid bolus. I have ordered 75cc/hr x10 hrs to continue ongoing fluid resuscitation  -consider repeat ECG tomorrow after fluid resuscitation  -patient should be NPO at midnight (okay for sips of water) in case of additional workup in AM  -Recommend bedside commode at night to decrease ambulation to restroom to decrease risk of fall  -Patient as at elevated but acceptable risk for surgery - I discussed this with the patient and her sisters    -Note, patient with episodes of delirium following prior spine surgery which patient felt was secondary to pain. Would recommend multimodal pain control and delirium reduction/precautions following surgery    Cade Julien DO  Anesthesiology PGY-2, CA-1    Patient seen, discussed with, and evaluated by Dr. Kamara

## 2024-05-10 ENCOUNTER — APPOINTMENT (OUTPATIENT)
Dept: CARDIOLOGY | Facility: HOSPITAL | Age: 74
DRG: 454 | End: 2024-05-10
Payer: MEDICARE

## 2024-05-10 ENCOUNTER — APPOINTMENT (OUTPATIENT)
Dept: PREADMISSION TESTING | Facility: HOSPITAL | Age: 74
End: 2024-05-10
Payer: MEDICARE

## 2024-05-10 LAB
ALBUMIN SERPL BCP-MCNC: 3.2 G/DL (ref 3.4–5)
ANION GAP SERPL CALC-SCNC: 10 MMOL/L (ref 10–20)
BUN SERPL-MCNC: 10 MG/DL (ref 6–23)
CALCIUM SERPL-MCNC: 8.9 MG/DL (ref 8.6–10.6)
CHLORIDE SERPL-SCNC: 102 MMOL/L (ref 98–107)
CO2 SERPL-SCNC: 29 MMOL/L (ref 21–32)
CREAT SERPL-MCNC: 0.39 MG/DL (ref 0.5–1.05)
EGFRCR SERPLBLD CKD-EPI 2021: >90 ML/MIN/1.73M*2
ERYTHROCYTE [DISTWIDTH] IN BLOOD BY AUTOMATED COUNT: 14.2 % (ref 11.5–14.5)
GLUCOSE SERPL-MCNC: 89 MG/DL (ref 74–99)
HCT VFR BLD AUTO: 36.5 % (ref 36–46)
HGB BLD-MCNC: 11 G/DL (ref 12–16)
LACTATE SERPL-SCNC: 1.8 MMOL/L (ref 0.4–2)
MCH RBC QN AUTO: 29 PG (ref 26–34)
MCHC RBC AUTO-ENTMCNC: 30.1 G/DL (ref 32–36)
MCV RBC AUTO: 96 FL (ref 80–100)
NRBC BLD-RTO: 0 /100 WBCS (ref 0–0)
PHOSPHATE SERPL-MCNC: 3 MG/DL (ref 2.5–4.9)
PLATELET # BLD AUTO: 257 X10*3/UL (ref 150–450)
POTASSIUM SERPL-SCNC: 3.1 MMOL/L (ref 3.5–5.3)
RBC # BLD AUTO: 3.79 X10*6/UL (ref 4–5.2)
SODIUM SERPL-SCNC: 138 MMOL/L (ref 136–145)
WBC # BLD AUTO: 8.6 X10*3/UL (ref 4.4–11.3)

## 2024-05-10 PROCEDURE — 80069 RENAL FUNCTION PANEL: CPT | Performed by: STUDENT IN AN ORGANIZED HEALTH CARE EDUCATION/TRAINING PROGRAM

## 2024-05-10 PROCEDURE — 93010 ELECTROCARDIOGRAM REPORT: CPT | Performed by: INTERNAL MEDICINE

## 2024-05-10 PROCEDURE — 36415 COLL VENOUS BLD VENIPUNCTURE: CPT

## 2024-05-10 PROCEDURE — 85027 COMPLETE CBC AUTOMATED: CPT | Performed by: STUDENT IN AN ORGANIZED HEALTH CARE EDUCATION/TRAINING PROGRAM

## 2024-05-10 PROCEDURE — 1100000001 HC PRIVATE ROOM DAILY

## 2024-05-10 PROCEDURE — 2500000006 HC RX 250 W HCPCS SELF ADMINISTERED DRUGS (ALT 637 FOR ALL PAYERS): Performed by: STUDENT IN AN ORGANIZED HEALTH CARE EDUCATION/TRAINING PROGRAM

## 2024-05-10 PROCEDURE — 2500000004 HC RX 250 GENERAL PHARMACY W/ HCPCS (ALT 636 FOR OP/ED): Performed by: STUDENT IN AN ORGANIZED HEALTH CARE EDUCATION/TRAINING PROGRAM

## 2024-05-10 PROCEDURE — 2500000001 HC RX 250 WO HCPCS SELF ADMINISTERED DRUGS (ALT 637 FOR MEDICARE OP): Performed by: STUDENT IN AN ORGANIZED HEALTH CARE EDUCATION/TRAINING PROGRAM

## 2024-05-10 PROCEDURE — 93005 ELECTROCARDIOGRAM TRACING: CPT

## 2024-05-10 PROCEDURE — 83605 ASSAY OF LACTIC ACID: CPT

## 2024-05-10 PROCEDURE — 2500000006 HC RX 250 W HCPCS SELF ADMINISTERED DRUGS (ALT 637 FOR ALL PAYERS): Performed by: ANESTHESIOLOGY

## 2024-05-10 PROCEDURE — 36415 COLL VENOUS BLD VENIPUNCTURE: CPT | Performed by: STUDENT IN AN ORGANIZED HEALTH CARE EDUCATION/TRAINING PROGRAM

## 2024-05-10 RX ORDER — SODIUM CHLORIDE, SODIUM LACTATE, POTASSIUM CHLORIDE, CALCIUM CHLORIDE 600; 310; 30; 20 MG/100ML; MG/100ML; MG/100ML; MG/100ML
75 INJECTION, SOLUTION INTRAVENOUS CONTINUOUS
Status: ACTIVE | OUTPATIENT
Start: 2024-05-10 | End: 2024-05-10

## 2024-05-10 RX ORDER — TIMOLOL MALEATE 5 MG/ML
1 SOLUTION/ DROPS OPHTHALMIC EVERY MORNING
Status: DISCONTINUED | OUTPATIENT
Start: 2024-05-11 | End: 2024-05-21 | Stop reason: HOSPADM

## 2024-05-10 RX ORDER — POTASSIUM CHLORIDE 20 MEQ/1
40 TABLET, EXTENDED RELEASE ORAL ONCE
Status: COMPLETED | OUTPATIENT
Start: 2024-05-10 | End: 2024-05-10

## 2024-05-10 RX ADMIN — METHOCARBAMOL 500 MG: 500 TABLET ORAL at 20:15

## 2024-05-10 RX ADMIN — METHOCARBAMOL 500 MG: 500 TABLET ORAL at 08:12

## 2024-05-10 RX ADMIN — POLYETHYLENE GLYCOL 3350 17 G: 17 POWDER, FOR SOLUTION ORAL at 08:12

## 2024-05-10 RX ADMIN — HYDROMORPHONE HYDROCHLORIDE 0.2 MG: 1 INJECTION, SOLUTION INTRAMUSCULAR; INTRAVENOUS; SUBCUTANEOUS at 08:20

## 2024-05-10 RX ADMIN — SENNOSIDES AND DOCUSATE SODIUM 2 TABLET: 8.6; 5 TABLET ORAL at 20:15

## 2024-05-10 RX ADMIN — GABAPENTIN 100 MG: 100 CAPSULE ORAL at 08:13

## 2024-05-10 RX ADMIN — LORAZEPAM 1.5 MG: 0.5 TABLET ORAL at 20:15

## 2024-05-10 RX ADMIN — HYDROMORPHONE HYDROCHLORIDE 0.2 MG: 1 INJECTION, SOLUTION INTRAMUSCULAR; INTRAVENOUS; SUBCUTANEOUS at 04:58

## 2024-05-10 RX ADMIN — SIMVASTATIN 40 MG: 20 TABLET, FILM COATED ORAL at 20:15

## 2024-05-10 RX ADMIN — AMLODIPINE BESYLATE 5 MG: 5 TABLET ORAL at 08:12

## 2024-05-10 RX ADMIN — SODIUM CHLORIDE, POTASSIUM CHLORIDE, SODIUM LACTATE AND CALCIUM CHLORIDE 75 ML/HR: 600; 310; 30; 20 INJECTION, SOLUTION INTRAVENOUS at 08:21

## 2024-05-10 RX ADMIN — HEPARIN SODIUM 5000 UNITS: 5000 INJECTION INTRAVENOUS; SUBCUTANEOUS at 07:00

## 2024-05-10 RX ADMIN — PANTOPRAZOLE SODIUM 40 MG: 40 TABLET, DELAYED RELEASE ORAL at 08:12

## 2024-05-10 RX ADMIN — SODIUM CHLORIDE, POTASSIUM CHLORIDE, SODIUM LACTATE AND CALCIUM CHLORIDE 75 ML/HR: 600; 310; 30; 20 INJECTION, SOLUTION INTRAVENOUS at 07:00

## 2024-05-10 RX ADMIN — HYDROMORPHONE HYDROCHLORIDE 0.2 MG: 1 INJECTION, SOLUTION INTRAMUSCULAR; INTRAVENOUS; SUBCUTANEOUS at 12:36

## 2024-05-10 RX ADMIN — GABAPENTIN 100 MG: 100 CAPSULE ORAL at 20:15

## 2024-05-10 RX ADMIN — SENNOSIDES AND DOCUSATE SODIUM 2 TABLET: 8.6; 5 TABLET ORAL at 08:12

## 2024-05-10 RX ADMIN — HEPARIN SODIUM 5000 UNITS: 5000 INJECTION INTRAVENOUS; SUBCUTANEOUS at 12:04

## 2024-05-10 RX ADMIN — MISOPROSTOL 200 MCG: 200 TABLET ORAL at 08:12

## 2024-05-10 RX ADMIN — LISINOPRIL 10 MG: 10 TABLET ORAL at 08:12

## 2024-05-10 RX ADMIN — HYDROMORPHONE HYDROCHLORIDE 0.2 MG: 1 INJECTION, SOLUTION INTRAMUSCULAR; INTRAVENOUS; SUBCUTANEOUS at 16:36

## 2024-05-10 RX ADMIN — FENOFIBRATE 54 MG: 54 TABLET ORAL at 08:12

## 2024-05-10 RX ADMIN — VENLAFAXINE 75 MG: 75 TABLET ORAL at 08:12

## 2024-05-10 RX ADMIN — POTASSIUM CHLORIDE 40 MEQ: 1500 TABLET, EXTENDED RELEASE ORAL at 12:39

## 2024-05-10 RX ADMIN — BUDESONIDE 9 MG: 3 CAPSULE, COATED PELLETS ORAL at 08:12

## 2024-05-10 RX ADMIN — HEPARIN SODIUM 5000 UNITS: 5000 INJECTION INTRAVENOUS; SUBCUTANEOUS at 20:16

## 2024-05-10 RX ADMIN — CEVIMELINE HYDROCHLORIDE 30 MG: 30 CAPSULE ORAL at 08:12

## 2024-05-10 RX ADMIN — PREDNISONE 3 MG: 1 TABLET ORAL at 08:12

## 2024-05-10 RX ADMIN — OXYCODONE HYDROCHLORIDE 10 MG: 5 TABLET ORAL at 20:19

## 2024-05-10 ASSESSMENT — COGNITIVE AND FUNCTIONAL STATUS - GENERAL
CLIMB 3 TO 5 STEPS WITH RAILING: A LOT
DAILY ACTIVITIY SCORE: 24
WALKING IN HOSPITAL ROOM: A LITTLE
MOBILITY SCORE: 21

## 2024-05-10 ASSESSMENT — PAIN DESCRIPTION - LOCATION: LOCATION: BACK

## 2024-05-10 ASSESSMENT — PAIN - FUNCTIONAL ASSESSMENT
PAIN_FUNCTIONAL_ASSESSMENT: 0-10
PAIN_FUNCTIONAL_ASSESSMENT: 0-10

## 2024-05-10 ASSESSMENT — PAIN SCALES - GENERAL
PAINLEVEL_OUTOF10: 7
PAINLEVEL_OUTOF10: 8
PAINLEVEL_OUTOF10: 7

## 2024-05-10 NOTE — DOCUMENTATION CLARIFICATION NOTE
"    PATIENT:               MORRIS DAMIAN  ACCT #:                  [id number]  MRN:                       [id number]  :                       1950  ADMIT DATE:       2024 12:39 PM  DISCH DATE:  RESPONDING PROVIDER #:        [id number]          PROVIDER RESPONSE TEXT:    paraparesis    CDI QUERY TEXT:    Clarification    Instruction:    Based on your assessment of the patient and the clinical information, please provide the requested documentation by clicking on the appropriate radio button and enter any additional information if prompted.    Question: Can a diagnosis be given for the above clinical information    When answering this query, please exercise your independent professional judgment. The fact that a question is being asked, does not imply that any particular answer is desired or expected.    The patient's clinical indicators include:  Clinical Information:   73 y.o. female with h/o HTN, HLD, GERD, RA, IBD, GI ulcer p/w back pain and BLE weakness.    Clinical Indicators:    HP by Dr. Obrien \"p/w back pain and BLE weakness L greater than R x 5mo. Patient had sudden onset back pain in 2023\" Exam: \"RUE D4 T4 B4 HG4 IO3   RLE HF4+ KE/DF/PF 5  LUE D4- T4- B/HG 4 IO3  LLE HF 4- KE/DF/PF 5  BLE spasticity\" \"MRI C6-7 severe stenosis w/ kyphotic deformity.\"     Consult by Dr. Bates \" consult requested by Seymour Elizabeth MD for back pain, leg weakness. \" \"MRI CS twice without success due to muscle spasms and severe pain. She continues to have back pain and is having increasing difficulty walking longer distances. She is using a walker. \"    Treatment: MRI,  OR planning    Risk Factors: MRI C6-7 severe stenosis w/ kyphotic deformity  Options provided:  -- paraparesis  -- paraparesis not present  -- Other - I will add my own diagnosis  -- Refer to Clinical Documentation Reviewer    Query created by: Lizbeth Moraes on 2024 11:43 AM      Electronically signed by:  CHEY DAVIES MD 2024 " 8:09 PM

## 2024-05-10 NOTE — PROGRESS NOTES
"Michelle Jack is a 73 y.o. female on day 2 of admission presenting with Cervical myelopathy (Multi).    Subjective     No events overnight.       Objective     Physical Exam    RUE D4 T4 B4 HG4 IO3  RLE HF4+ KE/DF/PF 5  LUE D4- T4- B/HG 4 IO3  LLE HF 4- KE/DF/PF 5  BLE spasticity    Last Recorded Vitals  Blood pressure 123/85, pulse 93, temperature 35.3 °C (95.5 °F), temperature source Temporal, resp. rate 18, height 1.626 m (5' 4\"), weight 57.4 kg (126 lb 8 oz), SpO2 93%.  Intake/Output last 3 Shifts:  I/O last 3 completed shifts:  In: 1782 (31.1 mL/kg) [P.O.:700; I.V.:82 (1.4 mL/kg); IV Piggyback:1000]  Out: 900 (15.7 mL/kg) [Urine:900 (0.4 mL/kg/hr)]  Weight: 57.4 kg     Relevant Results                Assessment/Plan   Principal Problem:    Cervical myelopathy (Multi)    Michelle Jack is a 73 y.o. female with h/o HTN, HLD, GERD, RA, IBD, GI ulcer p/w back pain and BLE weakness (L >R) x 5mo, MRI C6-7 severe stenosis w/ kyphotic deformity.     Plan:    Floor  OR Mon 5/13 extension/revision to T10  ASA restart plan  Asad iverson  PTOT  SCDs, SQH           Kranthi Bauer MD      "

## 2024-05-11 ENCOUNTER — ANESTHESIA EVENT (OUTPATIENT)
Dept: OPERATING ROOM | Facility: HOSPITAL | Age: 74
DRG: 454 | End: 2024-05-11
Payer: MEDICARE

## 2024-05-11 PROCEDURE — 2500000004 HC RX 250 GENERAL PHARMACY W/ HCPCS (ALT 636 FOR OP/ED): Performed by: STUDENT IN AN ORGANIZED HEALTH CARE EDUCATION/TRAINING PROGRAM

## 2024-05-11 PROCEDURE — 2500000001 HC RX 250 WO HCPCS SELF ADMINISTERED DRUGS (ALT 637 FOR MEDICARE OP): Performed by: ANESTHESIOLOGY

## 2024-05-11 PROCEDURE — 2500000001 HC RX 250 WO HCPCS SELF ADMINISTERED DRUGS (ALT 637 FOR MEDICARE OP): Performed by: STUDENT IN AN ORGANIZED HEALTH CARE EDUCATION/TRAINING PROGRAM

## 2024-05-11 PROCEDURE — 2500000004 HC RX 250 GENERAL PHARMACY W/ HCPCS (ALT 636 FOR OP/ED): Performed by: ANESTHESIOLOGY

## 2024-05-11 PROCEDURE — 2500000006 HC RX 250 W HCPCS SELF ADMINISTERED DRUGS (ALT 637 FOR ALL PAYERS): Performed by: STUDENT IN AN ORGANIZED HEALTH CARE EDUCATION/TRAINING PROGRAM

## 2024-05-11 PROCEDURE — 1100000001 HC PRIVATE ROOM DAILY

## 2024-05-11 PROCEDURE — 2500000001 HC RX 250 WO HCPCS SELF ADMINISTERED DRUGS (ALT 637 FOR MEDICARE OP)

## 2024-05-11 RX ORDER — KETOROLAC TROMETHAMINE 30 MG/ML
15 INJECTION, SOLUTION INTRAMUSCULAR; INTRAVENOUS EVERY 6 HOURS
Status: COMPLETED | OUTPATIENT
Start: 2024-05-11 | End: 2024-05-12

## 2024-05-11 RX ORDER — METOPROLOL TARTRATE 25 MG/1
25 TABLET, FILM COATED ORAL EVERY 8 HOURS
Status: DISCONTINUED | OUTPATIENT
Start: 2024-05-11 | End: 2024-05-11

## 2024-05-11 RX ORDER — METOPROLOL TARTRATE 25 MG/1
25 TABLET, FILM COATED ORAL EVERY 8 HOURS
Status: DISCONTINUED | OUTPATIENT
Start: 2024-05-11 | End: 2024-05-12

## 2024-05-11 RX ADMIN — KETOROLAC TROMETHAMINE 15 MG: 30 INJECTION, SOLUTION INTRAMUSCULAR; INTRAVENOUS at 17:56

## 2024-05-11 RX ADMIN — HEPARIN SODIUM 5000 UNITS: 5000 INJECTION INTRAVENOUS; SUBCUTANEOUS at 05:02

## 2024-05-11 RX ADMIN — POLYETHYLENE GLYCOL 3350 17 G: 17 POWDER, FOR SOLUTION ORAL at 08:30

## 2024-05-11 RX ADMIN — LORAZEPAM 1.5 MG: 0.5 TABLET ORAL at 20:20

## 2024-05-11 RX ADMIN — HYDROMORPHONE HYDROCHLORIDE 0.2 MG: 1 INJECTION, SOLUTION INTRAMUSCULAR; INTRAVENOUS; SUBCUTANEOUS at 13:58

## 2024-05-11 RX ADMIN — HYDROMORPHONE HYDROCHLORIDE 0.2 MG: 1 INJECTION, SOLUTION INTRAMUSCULAR; INTRAVENOUS; SUBCUTANEOUS at 09:54

## 2024-05-11 RX ADMIN — GABAPENTIN 100 MG: 100 CAPSULE ORAL at 20:20

## 2024-05-11 RX ADMIN — SENNOSIDES AND DOCUSATE SODIUM 2 TABLET: 8.6; 5 TABLET ORAL at 08:30

## 2024-05-11 RX ADMIN — OXYCODONE HYDROCHLORIDE 10 MG: 5 TABLET ORAL at 17:03

## 2024-05-11 RX ADMIN — BUDESONIDE 9 MG: 3 CAPSULE, COATED PELLETS ORAL at 08:29

## 2024-05-11 RX ADMIN — METOPROLOL TARTRATE 25 MG: 25 TABLET, FILM COATED ORAL at 12:03

## 2024-05-11 RX ADMIN — CEVIMELINE HYDROCHLORIDE 30 MG: 30 CAPSULE ORAL at 08:29

## 2024-05-11 RX ADMIN — TIMOLOL MALEATE 1 DROP: 5 SOLUTION/ DROPS OPHTHALMIC at 08:30

## 2024-05-11 RX ADMIN — OXYCODONE HYDROCHLORIDE 10 MG: 5 TABLET ORAL at 12:34

## 2024-05-11 RX ADMIN — MISOPROSTOL 200 MCG: 200 TABLET ORAL at 08:30

## 2024-05-11 RX ADMIN — PREDNISONE 3 MG: 1 TABLET ORAL at 08:30

## 2024-05-11 RX ADMIN — METOPROLOL TARTRATE 25 MG: 25 TABLET, FILM COATED ORAL at 20:20

## 2024-05-11 RX ADMIN — OXYCODONE HYDROCHLORIDE 10 MG: 5 TABLET ORAL at 08:21

## 2024-05-11 RX ADMIN — METHOCARBAMOL 500 MG: 500 TABLET ORAL at 08:30

## 2024-05-11 RX ADMIN — SENNOSIDES AND DOCUSATE SODIUM 2 TABLET: 8.6; 5 TABLET ORAL at 20:20

## 2024-05-11 RX ADMIN — VENLAFAXINE 75 MG: 75 TABLET ORAL at 08:30

## 2024-05-11 RX ADMIN — AMLODIPINE BESYLATE 5 MG: 5 TABLET ORAL at 08:29

## 2024-05-11 RX ADMIN — SIMVASTATIN 40 MG: 20 TABLET, FILM COATED ORAL at 20:20

## 2024-05-11 RX ADMIN — HEPARIN SODIUM 5000 UNITS: 5000 INJECTION INTRAVENOUS; SUBCUTANEOUS at 20:23

## 2024-05-11 RX ADMIN — GABAPENTIN 100 MG: 100 CAPSULE ORAL at 08:30

## 2024-05-11 RX ADMIN — HYDROMORPHONE HYDROCHLORIDE 0.2 MG: 1 INJECTION, SOLUTION INTRAMUSCULAR; INTRAVENOUS; SUBCUTANEOUS at 05:02

## 2024-05-11 RX ADMIN — LISINOPRIL 10 MG: 10 TABLET ORAL at 08:30

## 2024-05-11 RX ADMIN — FENOFIBRATE 54 MG: 54 TABLET ORAL at 08:30

## 2024-05-11 RX ADMIN — HEPARIN SODIUM 5000 UNITS: 5000 INJECTION INTRAVENOUS; SUBCUTANEOUS at 12:34

## 2024-05-11 RX ADMIN — KETOROLAC TROMETHAMINE 15 MG: 30 INJECTION, SOLUTION INTRAMUSCULAR; INTRAVENOUS at 12:02

## 2024-05-11 RX ADMIN — METHOCARBAMOL 500 MG: 500 TABLET ORAL at 20:20

## 2024-05-11 RX ADMIN — PANTOPRAZOLE SODIUM 40 MG: 40 TABLET, DELAYED RELEASE ORAL at 05:02

## 2024-05-11 ASSESSMENT — COGNITIVE AND FUNCTIONAL STATUS - GENERAL
CLIMB 3 TO 5 STEPS WITH RAILING: A LOT
MOBILITY SCORE: 21
WALKING IN HOSPITAL ROOM: A LITTLE
DAILY ACTIVITIY SCORE: 24

## 2024-05-11 ASSESSMENT — PAIN - FUNCTIONAL ASSESSMENT
PAIN_FUNCTIONAL_ASSESSMENT: 0-10

## 2024-05-11 ASSESSMENT — PAIN SCALES - GENERAL
PAINLEVEL_OUTOF10: 7
PAINLEVEL_OUTOF10: 7
PAINLEVEL_OUTOF10: 8
PAINLEVEL_OUTOF10: 7
PAINLEVEL_OUTOF10: 8
PAINLEVEL_OUTOF10: 7

## 2024-05-11 ASSESSMENT — PAIN SCALES - PAIN ASSESSMENT IN ADVANCED DEMENTIA (PAINAD): TOTALSCORE: MEDICATION (SEE MAR)

## 2024-05-11 ASSESSMENT — PAIN DESCRIPTION - LOCATION
LOCATION: BACK
LOCATION: HEAD
LOCATION: BACK

## 2024-05-11 NOTE — PROGRESS NOTES
"Michelle Jack is a 73 y.o. female on day 3 of admission presenting with Cervical myelopathy (Multi).    Subjective     No events overnight.       Objective     Physical Exam    RUE D4 T4 B4 HG4 IO3  RLE HF4+ KE/DF/PF 5  LUE D4- T4- B/HG 4 IO3  LLE HF 4- KE/DF/PF 5  BLE spasticity    Last Recorded Vitals  Blood pressure 127/86, pulse (!) 111, temperature 36.5 °C (97.7 °F), temperature source Temporal, resp. rate 16, height 1.626 m (5' 4\"), weight 57.4 kg (126 lb 8 oz), SpO2 93%.  Intake/Output last 3 Shifts:  I/O last 3 completed shifts:  In: 1782 (31.1 mL/kg) [P.O.:700; I.V.:82 (1.4 mL/kg); IV Piggyback:1000]  Out: 3400 (59.3 mL/kg) [Urine:3400 (1.6 mL/kg/hr)]  Weight: 57.4 kg     Relevant Results                Assessment/Plan   Principal Problem:    Cervical myelopathy (Multi)    Michelle Jack is a 73 y.o. female with h/o HTN, HLD, GERD, RA, IBD, GI ulcer p/w back pain and BLE weakness (L >R) x 5mo, MRI C6-7 severe stenosis w/ kyphotic deformity.     Plan:    Floor  OR Mon 5/13 extension/revision to T10  ASA restart plan  Sidagam recs-elevated but acceptable risk  PTOT  SCDs, SQH           Kranthi Bauer MD      "

## 2024-05-12 LAB
ABO GROUP (TYPE) IN BLOOD: NORMAL
ALBUMIN SERPL BCP-MCNC: 3.5 G/DL (ref 3.4–5)
ANION GAP SERPL CALC-SCNC: 16 MMOL/L (ref 10–20)
ANTIBODY SCREEN: NORMAL
APPEARANCE UR: CLEAR
APTT PPP: 29 SECONDS (ref 27–38)
BACTERIA BLD CULT: NORMAL
BACTERIA BLD CULT: NORMAL
BILIRUB UR STRIP.AUTO-MCNC: NEGATIVE MG/DL
BUN SERPL-MCNC: 16 MG/DL (ref 6–23)
CALCIUM SERPL-MCNC: 9.2 MG/DL (ref 8.6–10.6)
CHLORIDE SERPL-SCNC: 102 MMOL/L (ref 98–107)
CO2 SERPL-SCNC: 26 MMOL/L (ref 21–32)
COLOR UR: YELLOW
CREAT SERPL-MCNC: 0.52 MG/DL (ref 0.5–1.05)
EGFRCR SERPLBLD CKD-EPI 2021: >90 ML/MIN/1.73M*2
ERYTHROCYTE [DISTWIDTH] IN BLOOD BY AUTOMATED COUNT: 14.2 % (ref 11.5–14.5)
GLUCOSE SERPL-MCNC: 123 MG/DL (ref 74–99)
GLUCOSE UR STRIP.AUTO-MCNC: NORMAL MG/DL
HCT VFR BLD AUTO: 39.3 % (ref 36–46)
HGB BLD-MCNC: 11.8 G/DL (ref 12–16)
INR PPP: 0.9 (ref 0.9–1.1)
KETONES UR STRIP.AUTO-MCNC: NEGATIVE MG/DL
LEUKOCYTE ESTERASE UR QL STRIP.AUTO: NEGATIVE
MCH RBC QN AUTO: 29.4 PG (ref 26–34)
MCHC RBC AUTO-ENTMCNC: 30 G/DL (ref 32–36)
MCV RBC AUTO: 98 FL (ref 80–100)
NITRITE UR QL STRIP.AUTO: NEGATIVE
NRBC BLD-RTO: 0 /100 WBCS (ref 0–0)
PH UR STRIP.AUTO: 6 [PH]
PHOSPHATE SERPL-MCNC: 4.6 MG/DL (ref 2.5–4.9)
PLATELET # BLD AUTO: 291 X10*3/UL (ref 150–450)
POTASSIUM SERPL-SCNC: 4.6 MMOL/L (ref 3.5–5.3)
PROT UR STRIP.AUTO-MCNC: NEGATIVE MG/DL
PROTHROMBIN TIME: 10.4 SECONDS (ref 9.8–12.8)
RBC # BLD AUTO: 4.02 X10*6/UL (ref 4–5.2)
RBC # UR STRIP.AUTO: NEGATIVE /UL
RH FACTOR (ANTIGEN D): NORMAL
SODIUM SERPL-SCNC: 139 MMOL/L (ref 136–145)
SP GR UR STRIP.AUTO: 1.02
UROBILINOGEN UR STRIP.AUTO-MCNC: NORMAL MG/DL
WBC # BLD AUTO: 7.7 X10*3/UL (ref 4.4–11.3)

## 2024-05-12 PROCEDURE — 81003 URINALYSIS AUTO W/O SCOPE: CPT | Performed by: STUDENT IN AN ORGANIZED HEALTH CARE EDUCATION/TRAINING PROGRAM

## 2024-05-12 PROCEDURE — 1100000001 HC PRIVATE ROOM DAILY

## 2024-05-12 PROCEDURE — 85610 PROTHROMBIN TIME: CPT | Performed by: STUDENT IN AN ORGANIZED HEALTH CARE EDUCATION/TRAINING PROGRAM

## 2024-05-12 PROCEDURE — 2500000001 HC RX 250 WO HCPCS SELF ADMINISTERED DRUGS (ALT 637 FOR MEDICARE OP): Performed by: STUDENT IN AN ORGANIZED HEALTH CARE EDUCATION/TRAINING PROGRAM

## 2024-05-12 PROCEDURE — 36415 COLL VENOUS BLD VENIPUNCTURE: CPT | Performed by: STUDENT IN AN ORGANIZED HEALTH CARE EDUCATION/TRAINING PROGRAM

## 2024-05-12 PROCEDURE — 85027 COMPLETE CBC AUTOMATED: CPT | Performed by: STUDENT IN AN ORGANIZED HEALTH CARE EDUCATION/TRAINING PROGRAM

## 2024-05-12 PROCEDURE — 2500000001 HC RX 250 WO HCPCS SELF ADMINISTERED DRUGS (ALT 637 FOR MEDICARE OP): Performed by: ANESTHESIOLOGY

## 2024-05-12 PROCEDURE — 2500000004 HC RX 250 GENERAL PHARMACY W/ HCPCS (ALT 636 FOR OP/ED): Performed by: ANESTHESIOLOGY

## 2024-05-12 PROCEDURE — 2500000006 HC RX 250 W HCPCS SELF ADMINISTERED DRUGS (ALT 637 FOR ALL PAYERS): Performed by: STUDENT IN AN ORGANIZED HEALTH CARE EDUCATION/TRAINING PROGRAM

## 2024-05-12 PROCEDURE — 86923 COMPATIBILITY TEST ELECTRIC: CPT | Mod: 91

## 2024-05-12 PROCEDURE — 99232 SBSQ HOSP IP/OBS MODERATE 35: CPT | Performed by: ANESTHESIOLOGY

## 2024-05-12 PROCEDURE — 80069 RENAL FUNCTION PANEL: CPT | Performed by: STUDENT IN AN ORGANIZED HEALTH CARE EDUCATION/TRAINING PROGRAM

## 2024-05-12 PROCEDURE — 2500000004 HC RX 250 GENERAL PHARMACY W/ HCPCS (ALT 636 FOR OP/ED): Performed by: STUDENT IN AN ORGANIZED HEALTH CARE EDUCATION/TRAINING PROGRAM

## 2024-05-12 PROCEDURE — 86901 BLOOD TYPING SEROLOGIC RH(D): CPT | Performed by: STUDENT IN AN ORGANIZED HEALTH CARE EDUCATION/TRAINING PROGRAM

## 2024-05-12 RX ORDER — SODIUM CHLORIDE 9 MG/ML
100 INJECTION, SOLUTION INTRAVENOUS CONTINUOUS
Status: DISCONTINUED | OUTPATIENT
Start: 2024-05-13 | End: 2024-05-15

## 2024-05-12 RX ORDER — METOPROLOL TARTRATE 25 MG/1
12.5 TABLET, FILM COATED ORAL EVERY 8 HOURS
Status: DISCONTINUED | OUTPATIENT
Start: 2024-05-12 | End: 2024-05-14

## 2024-05-12 RX ORDER — POTASSIUM CHLORIDE 20 MEQ/1
40 TABLET, EXTENDED RELEASE ORAL ONCE
Status: DISCONTINUED | OUTPATIENT
Start: 2024-05-12 | End: 2024-05-12

## 2024-05-12 RX ADMIN — KETOROLAC TROMETHAMINE 15 MG: 30 INJECTION, SOLUTION INTRAMUSCULAR; INTRAVENOUS at 05:31

## 2024-05-12 RX ADMIN — HEPARIN SODIUM 5000 UNITS: 5000 INJECTION INTRAVENOUS; SUBCUTANEOUS at 05:31

## 2024-05-12 RX ADMIN — CEVIMELINE HYDROCHLORIDE 30 MG: 30 CAPSULE ORAL at 09:19

## 2024-05-12 RX ADMIN — METOPROLOL TARTRATE 25 MG: 25 TABLET, FILM COATED ORAL at 05:31

## 2024-05-12 RX ADMIN — OXYCODONE HYDROCHLORIDE 10 MG: 5 TABLET ORAL at 21:50

## 2024-05-12 RX ADMIN — FENOFIBRATE 54 MG: 54 TABLET ORAL at 09:22

## 2024-05-12 RX ADMIN — SIMVASTATIN 40 MG: 20 TABLET, FILM COATED ORAL at 21:50

## 2024-05-12 RX ADMIN — MISOPROSTOL 200 MCG: 200 TABLET ORAL at 09:20

## 2024-05-12 RX ADMIN — HEPARIN SODIUM 5000 UNITS: 5000 INJECTION INTRAVENOUS; SUBCUTANEOUS at 15:23

## 2024-05-12 RX ADMIN — SENNOSIDES AND DOCUSATE SODIUM 2 TABLET: 8.6; 5 TABLET ORAL at 21:50

## 2024-05-12 RX ADMIN — HYDROMORPHONE HYDROCHLORIDE 0.2 MG: 1 INJECTION, SOLUTION INTRAMUSCULAR; INTRAVENOUS; SUBCUTANEOUS at 10:19

## 2024-05-12 RX ADMIN — METOPROLOL TARTRATE 12.5 MG: 25 TABLET, FILM COATED ORAL at 18:57

## 2024-05-12 RX ADMIN — SENNOSIDES AND DOCUSATE SODIUM 2 TABLET: 8.6; 5 TABLET ORAL at 09:21

## 2024-05-12 RX ADMIN — HEPARIN SODIUM 5000 UNITS: 5000 INJECTION INTRAVENOUS; SUBCUTANEOUS at 21:51

## 2024-05-12 RX ADMIN — METHOCARBAMOL 500 MG: 500 TABLET ORAL at 09:19

## 2024-05-12 RX ADMIN — METOPROLOL TARTRATE 25 MG: 25 TABLET, FILM COATED ORAL at 11:39

## 2024-05-12 RX ADMIN — OXYCODONE HYDROCHLORIDE 10 MG: 5 TABLET ORAL at 09:20

## 2024-05-12 RX ADMIN — KETOROLAC TROMETHAMINE 15 MG: 30 INJECTION, SOLUTION INTRAMUSCULAR; INTRAVENOUS at 00:23

## 2024-05-12 RX ADMIN — LORAZEPAM 1.5 MG: 0.5 TABLET ORAL at 21:51

## 2024-05-12 RX ADMIN — GABAPENTIN 100 MG: 100 CAPSULE ORAL at 09:22

## 2024-05-12 RX ADMIN — HYDROMORPHONE HYDROCHLORIDE 0.2 MG: 1 INJECTION, SOLUTION INTRAMUSCULAR; INTRAVENOUS; SUBCUTANEOUS at 16:31

## 2024-05-12 RX ADMIN — METHOCARBAMOL 500 MG: 500 TABLET ORAL at 21:50

## 2024-05-12 RX ADMIN — PREDNISONE 3 MG: 1 TABLET ORAL at 09:20

## 2024-05-12 RX ADMIN — VENLAFAXINE 75 MG: 75 TABLET ORAL at 09:21

## 2024-05-12 RX ADMIN — BUDESONIDE 9 MG: 3 CAPSULE, COATED PELLETS ORAL at 09:19

## 2024-05-12 RX ADMIN — POLYETHYLENE GLYCOL 3350 17 G: 17 POWDER, FOR SOLUTION ORAL at 09:20

## 2024-05-12 RX ADMIN — TIMOLOL MALEATE 1 DROP: 5 SOLUTION/ DROPS OPHTHALMIC at 09:22

## 2024-05-12 RX ADMIN — AMLODIPINE BESYLATE 5 MG: 5 TABLET ORAL at 09:18

## 2024-05-12 RX ADMIN — OXYCODONE HYDROCHLORIDE 10 MG: 5 TABLET ORAL at 00:23

## 2024-05-12 RX ADMIN — OXYCODONE HYDROCHLORIDE 10 MG: 5 TABLET ORAL at 15:22

## 2024-05-12 RX ADMIN — GABAPENTIN 100 MG: 100 CAPSULE ORAL at 21:50

## 2024-05-12 RX ADMIN — PANTOPRAZOLE SODIUM 40 MG: 40 TABLET, DELAYED RELEASE ORAL at 05:31

## 2024-05-12 ASSESSMENT — COGNITIVE AND FUNCTIONAL STATUS - GENERAL
DAILY ACTIVITIY SCORE: 24
WALKING IN HOSPITAL ROOM: A LITTLE
CLIMB 3 TO 5 STEPS WITH RAILING: A LOT
MOBILITY SCORE: 21

## 2024-05-12 ASSESSMENT — PAIN DESCRIPTION - LOCATION
LOCATION: BACK

## 2024-05-12 ASSESSMENT — PAIN SCALES - GENERAL
PAINLEVEL_OUTOF10: 7
PAINLEVEL_OUTOF10: 8
PAINLEVEL_OUTOF10: 8
PAINLEVEL_OUTOF10: 7

## 2024-05-12 ASSESSMENT — PAIN - FUNCTIONAL ASSESSMENT
PAIN_FUNCTIONAL_ASSESSMENT: 0-10

## 2024-05-12 NOTE — PROGRESS NOTES
"Michelle Jack is a 73 y.o. female on day 4 of admission presenting with Cervical myelopathy (Multi).    Subjective     No events overnight.       Objective     Physical Exam    RUE D4 T4 B4 HG4 IO3  RLE HF4+ KE/DF/PF 5  LUE D4- T4- B/HG 4 IO3  LLE HF 4- KE/DF/PF 5  BLE spasticity    Last Recorded Vitals  Blood pressure 121/82, pulse 97, temperature 36.3 °C (97.3 °F), temperature source Temporal, resp. rate 18, height 1.626 m (5' 4\"), weight 57.4 kg (126 lb 8 oz), SpO2 97%.  Intake/Output last 3 Shifts:  I/O last 3 completed shifts:  In: - (0 mL/kg)   Out: 3875 (67.5 mL/kg) [Urine:3875 (1.9 mL/kg/hr)]  Weight: 57.4 kg     Relevant Results                Assessment/Plan   Principal Problem:    Cervical myelopathy (Multi)    Michelle Jack is a 73 y.o. female with h/o HTN, HLD, GERD, RA, IBD, GI ulcer p/w back pain and BLE weakness (L >R) x 5mo, MRI C6-7 severe stenosis w/ kyphotic deformity.     Plan:    Floor  OR Mon 5/13 extension/revision to T10  ASA restart plan  Asad recs-elevated but acceptable risk  PTOT  SCDs, SQH           Kranthi Bauer MD      "

## 2024-05-12 NOTE — PROGRESS NOTES
"Michelle Jack is a 73 y.o. female on day 4 of admission presenting with Cervical myelopathy (Multi).    Subjective   Overall continues to be in constant back pain from deformity and has generally been anxious and tremulous.       Objective     Physical Exam  AOX 3  Frail appearing older lady  SIS2  Lungs- clear  Abd soft nontender  No pedal edema    Last Recorded Vitals  Blood pressure 135/85, pulse 82, temperature 36.5 °C (97.7 °F), temperature source Temporal, resp. rate 18, height 1.626 m (5' 4\"), weight 57.4 kg (126 lb 8 oz), SpO2 94%.  Intake/Output last 3 Shifts:  I/O last 3 completed shifts:  In: - (0 mL/kg)   Out: 1275 (22.2 mL/kg) [Urine:1275 (0.6 mL/kg/hr)]  Weight: 57.4 kg     Relevant Results        Assessment/Plan   73yr old lady for major spine reconstructive surgery after worsening adjacent segment disease after prior spine surgery.    Please see our Consult note for full eval recs.    Pt had sinus tachy on arrival with elevated lactate, BUN/Crea ratio was hihg and pt admitted to poor oral fluid intake to avoid getting up to go to the bathroom due to back pain.  Rehydrated with 1750ml of IV fluid which improved HR to 110/mt from admission HR of 140/mt  Multiple EKG revealed sinus achy which improves at night and worsens on ambulation.  CT PE- Neg, Echo normal  Remote h/o alcohol abuse, quit 4 yrs ago, no evidence of drug withdrawal.  Currently on low dose prednisone for Chronic  deforming RA.  Pt along with her two sisters in room was very anxious about surgery.    HR responded very well to low dose metoprolol.  No additional recs at this time.  Pt is felt to be acceptable risk to proceed for surgery. Discussed with pt and her sisters        Mazin Kamara MD      "

## 2024-05-13 ENCOUNTER — APPOINTMENT (OUTPATIENT)
Dept: RADIOLOGY | Facility: HOSPITAL | Age: 74
DRG: 454 | End: 2024-05-13
Payer: MEDICARE

## 2024-05-13 ENCOUNTER — APPOINTMENT (OUTPATIENT)
Dept: PREADMISSION TESTING | Facility: HOSPITAL | Age: 74
End: 2024-05-13
Payer: MEDICARE

## 2024-05-13 ENCOUNTER — APPOINTMENT (OUTPATIENT)
Dept: NEUROLOGY | Facility: HOSPITAL | Age: 74
DRG: 454 | End: 2024-05-13
Payer: MEDICARE

## 2024-05-13 ENCOUNTER — ANESTHESIA (OUTPATIENT)
Dept: OPERATING ROOM | Facility: HOSPITAL | Age: 74
DRG: 454 | End: 2024-05-13
Payer: MEDICARE

## 2024-05-13 LAB
ANION GAP BLDA CALCULATED.4IONS-SCNC: 6 MMO/L (ref 10–25)
BASE EXCESS BLDA CALC-SCNC: 0.4 MMOL/L (ref -2–3)
BLOOD EXPIRATION DATE: NORMAL
BODY TEMPERATURE: 37 DEGREES CELSIUS
CA-I BLDA-SCNC: 1.24 MMOL/L (ref 1.1–1.33)
CHLORIDE BLDA-SCNC: 106 MMOL/L (ref 98–107)
DISPENSE STATUS: NORMAL
ERYTHROCYTE [DISTWIDTH] IN BLOOD BY AUTOMATED COUNT: 14.1 % (ref 11.5–14.5)
GLUCOSE BLDA-MCNC: 203 MG/DL (ref 74–99)
HCO3 BLDA-SCNC: 26.5 MMOL/L (ref 22–26)
HCT VFR BLD AUTO: 31.6 % (ref 36–46)
HCT VFR BLD EST: 36 % (ref 36–46)
HGB BLD-MCNC: 9.5 G/DL (ref 12–16)
HGB BLDA-MCNC: 11.9 G/DL (ref 12–16)
INHALED O2 CONCENTRATION: 50 %
LACTATE BLDA-SCNC: 1.3 MMOL/L (ref 0.4–2)
MCH RBC QN AUTO: 30 PG (ref 26–34)
MCHC RBC AUTO-ENTMCNC: 30.1 G/DL (ref 32–36)
MCV RBC AUTO: 100 FL (ref 80–100)
NRBC BLD-RTO: 0 /100 WBCS (ref 0–0)
OXYHGB MFR BLDA: 96.7 % (ref 94–98)
PCO2 BLDA: 48 MM HG (ref 38–42)
PH BLDA: 7.35 PH (ref 7.38–7.42)
PLATELET # BLD AUTO: 258 X10*3/UL (ref 150–450)
PO2 BLDA: 293 MM HG (ref 85–95)
POTASSIUM BLDA-SCNC: 4.3 MMOL/L (ref 3.5–5.3)
PRODUCT BLOOD TYPE: 6200
PRODUCT CODE: NORMAL
RBC # BLD AUTO: 3.17 X10*6/UL (ref 4–5.2)
SAO2 % BLDA: 97 % (ref 94–100)
SODIUM BLDA-SCNC: 134 MMOL/L (ref 136–145)
UNIT ABO: NORMAL
UNIT NUMBER: NORMAL
UNIT RH: NORMAL
UNIT VOLUME: 203
WBC # BLD AUTO: 12 X10*3/UL (ref 4.4–11.3)

## 2024-05-13 PROCEDURE — 00NY0ZZ RELEASE LUMBAR SPINAL CORD, OPEN APPROACH: ICD-10-PCS | Performed by: NEUROLOGICAL SURGERY

## 2024-05-13 PROCEDURE — 1200000002 HC GENERAL ROOM WITH TELEMETRY DAILY

## 2024-05-13 PROCEDURE — 63047 LAM FACETEC & FORAMOT LUMBAR: CPT | Performed by: NEUROLOGICAL SURGERY

## 2024-05-13 PROCEDURE — 2500000004 HC RX 250 GENERAL PHARMACY W/ HCPCS (ALT 636 FOR OP/ED): Performed by: STUDENT IN AN ORGANIZED HEALTH CARE EDUCATION/TRAINING PROGRAM

## 2024-05-13 PROCEDURE — 22843 INSERT SPINE FIXATION DEVICE: CPT | Performed by: NEUROLOGICAL SURGERY

## 2024-05-13 PROCEDURE — 61783 SCAN PROC SPINAL: CPT | Performed by: NEUROLOGICAL SURGERY

## 2024-05-13 PROCEDURE — 7100000001 HC RECOVERY ROOM TIME - INITIAL BASE CHARGE: Performed by: NEUROLOGICAL SURGERY

## 2024-05-13 PROCEDURE — 22214 INCIS 1 VERTEBRAL SEG LUMBAR: CPT | Performed by: NEUROLOGICAL SURGERY

## 2024-05-13 PROCEDURE — C1821 INTERSPINOUS IMPLANT: HCPCS | Performed by: NEUROLOGICAL SURGERY

## 2024-05-13 PROCEDURE — 0SB20ZZ EXCISION OF LUMBAR VERTEBRAL DISC, OPEN APPROACH: ICD-10-PCS | Performed by: NEUROLOGICAL SURGERY

## 2024-05-13 PROCEDURE — 85027 COMPLETE CBC AUTOMATED: CPT | Performed by: STUDENT IN AN ORGANIZED HEALTH CARE EDUCATION/TRAINING PROGRAM

## 2024-05-13 PROCEDURE — 76937 US GUIDE VASCULAR ACCESS: CPT

## 2024-05-13 PROCEDURE — 0QH204Z INSERTION OF INTERNAL FIXATION DEVICE INTO RIGHT PELVIC BONE, OPEN APPROACH: ICD-10-PCS | Performed by: NEUROLOGICAL SURGERY

## 2024-05-13 PROCEDURE — 22614 ARTHRD PST TQ 1NTRSPC EA ADD: CPT | Performed by: NEUROLOGICAL SURGERY

## 2024-05-13 PROCEDURE — 22848 INSERT PELV FIXATION DEVICE: CPT | Performed by: NEUROLOGICAL SURGERY

## 2024-05-13 PROCEDURE — 95938 SOMATOSENSORY TESTING: CPT

## 2024-05-13 PROCEDURE — 2500000004 HC RX 250 GENERAL PHARMACY W/ HCPCS (ALT 636 FOR OP/ED)

## 2024-05-13 PROCEDURE — A4649 SURGICAL SUPPLIES: HCPCS | Performed by: NEUROLOGICAL SURGERY

## 2024-05-13 PROCEDURE — 36415 COLL VENOUS BLD VENIPUNCTURE: CPT | Performed by: STUDENT IN AN ORGANIZED HEALTH CARE EDUCATION/TRAINING PROGRAM

## 2024-05-13 PROCEDURE — 2500000001 HC RX 250 WO HCPCS SELF ADMINISTERED DRUGS (ALT 637 FOR MEDICARE OP): Performed by: NEUROLOGICAL SURGERY

## 2024-05-13 PROCEDURE — 0SG30AJ FUSION OF LUMBOSACRAL JOINT WITH INTERBODY FUSION DEVICE, POSTERIOR APPROACH, ANTERIOR COLUMN, OPEN APPROACH: ICD-10-PCS | Performed by: NEUROLOGICAL SURGERY

## 2024-05-13 PROCEDURE — 0RG6071 FUSION OF THORACIC VERTEBRAL JOINT WITH AUTOLOGOUS TISSUE SUBSTITUTE, POSTERIOR APPROACH, POSTERIOR COLUMN, OPEN APPROACH: ICD-10-PCS | Performed by: NEUROLOGICAL SURGERY

## 2024-05-13 PROCEDURE — 8E0WXBZ COMPUTER ASSISTED PROCEDURE OF TRUNK REGION: ICD-10-PCS | Performed by: NEUROLOGICAL SURGERY

## 2024-05-13 PROCEDURE — 7100000002 HC RECOVERY ROOM TIME - EACH INCREMENTAL 1 MINUTE: Performed by: NEUROLOGICAL SURGERY

## 2024-05-13 PROCEDURE — C1762 CONN TISS, HUMAN(INC FASCIA): HCPCS | Performed by: NEUROLOGICAL SURGERY

## 2024-05-13 PROCEDURE — 2500000005 HC RX 250 GENERAL PHARMACY W/O HCPCS: Performed by: NEUROLOGICAL SURGERY

## 2024-05-13 PROCEDURE — 2500000004 HC RX 250 GENERAL PHARMACY W/ HCPCS (ALT 636 FOR OP/ED): Performed by: ANESTHESIOLOGY

## 2024-05-13 PROCEDURE — 2500000004 HC RX 250 GENERAL PHARMACY W/ HCPCS (ALT 636 FOR OP/ED): Performed by: NEUROLOGICAL SURGERY

## 2024-05-13 PROCEDURE — 0SG1071 FUSION OF 2 OR MORE LUMBAR VERTEBRAL JOINTS WITH AUTOLOGOUS TISSUE SUBSTITUTE, POSTERIOR APPROACH, POSTERIOR COLUMN, OPEN APPROACH: ICD-10-PCS | Performed by: NEUROLOGICAL SURGERY

## 2024-05-13 PROCEDURE — 3700000001 HC GENERAL ANESTHESIA TIME - INITIAL BASE CHARGE: Performed by: NEUROLOGICAL SURGERY

## 2024-05-13 PROCEDURE — 3700000002 HC GENERAL ANESTHESIA TIME - EACH INCREMENTAL 1 MINUTE: Performed by: NEUROLOGICAL SURGERY

## 2024-05-13 PROCEDURE — A22633 PR ARTHDSIS POST/POSTEROLATRL/POSTINTERBODY LUMBAR: Performed by: ANESTHESIOLOGY

## 2024-05-13 PROCEDURE — 22853 INSJ BIOMECHANICAL DEVICE: CPT | Performed by: NEUROLOGICAL SURGERY

## 2024-05-13 PROCEDURE — 22633 ARTHRD CMBN 1NTRSPC LUMBAR: CPT | Performed by: NEUROLOGICAL SURGERY

## 2024-05-13 PROCEDURE — 2720000007 HC OR 272 NO HCPCS: Performed by: NEUROLOGICAL SURGERY

## 2024-05-13 PROCEDURE — 3600000009 HC OR TIME - EACH INCREMENTAL 1 MINUTE - PROCEDURE LEVEL FOUR: Performed by: NEUROLOGICAL SURGERY

## 2024-05-13 PROCEDURE — 2500000006 HC RX 250 W HCPCS SELF ADMINISTERED DRUGS (ALT 637 FOR ALL PAYERS): Performed by: STUDENT IN AN ORGANIZED HEALTH CARE EDUCATION/TRAINING PROGRAM

## 2024-05-13 PROCEDURE — 0QH304Z INSERTION OF INTERNAL FIXATION DEVICE INTO LEFT PELVIC BONE, OPEN APPROACH: ICD-10-PCS | Performed by: NEUROLOGICAL SURGERY

## 2024-05-13 PROCEDURE — 0RGA071 FUSION OF THORACOLUMBAR VERTEBRAL JOINT WITH AUTOLOGOUS TISSUE SUBSTITUTE, POSTERIOR APPROACH, POSTERIOR COLUMN, OPEN APPROACH: ICD-10-PCS | Performed by: NEUROLOGICAL SURGERY

## 2024-05-13 PROCEDURE — C1713 ANCHOR/SCREW BN/BN,TIS/BN: HCPCS | Performed by: NEUROLOGICAL SURGERY

## 2024-05-13 PROCEDURE — C1776 JOINT DEVICE (IMPLANTABLE): HCPCS | Performed by: NEUROLOGICAL SURGERY

## 2024-05-13 PROCEDURE — 84132 ASSAY OF SERUM POTASSIUM: CPT | Mod: 91 | Performed by: NEUROLOGICAL SURGERY

## 2024-05-13 PROCEDURE — 99100 ANES PT EXTEME AGE<1 YR&>70: CPT | Performed by: ANESTHESIOLOGY

## 2024-05-13 PROCEDURE — 3600000004 HC OR TIME - INITIAL BASE CHARGE - PROCEDURE LEVEL FOUR: Performed by: NEUROLOGICAL SURGERY

## 2024-05-13 PROCEDURE — 2780000003 HC OR 278 NO HCPCS: Performed by: NEUROLOGICAL SURGERY

## 2024-05-13 PROCEDURE — 2500000001 HC RX 250 WO HCPCS SELF ADMINISTERED DRUGS (ALT 637 FOR MEDICARE OP): Performed by: STUDENT IN AN ORGANIZED HEALTH CARE EDUCATION/TRAINING PROGRAM

## 2024-05-13 PROCEDURE — 2500000005 HC RX 250 GENERAL PHARMACY W/O HCPCS

## 2024-05-13 PROCEDURE — 36620 INSERTION CATHETER ARTERY: CPT

## 2024-05-13 PROCEDURE — 72100 X-RAY EXAM L-S SPINE 2/3 VWS: CPT

## 2024-05-13 DEVICE — CONNECTOR RELINE-O, 20MM OPEN OFFSET: Type: IMPLANTABLE DEVICE | Site: SPINE LUMBAR | Status: FUNCTIONAL

## 2024-05-13 DEVICE — SCREW, RELINE-O, 8.5X80MM 2S POLY ILIAC: Type: IMPLANTABLE DEVICE | Site: SPINE LUMBAR | Status: FUNCTIONAL

## 2024-05-13 DEVICE — SCREW, RELINE-O, 7.5X45MM 2S POLYAXIAL: Type: IMPLANTABLE DEVICE | Site: SPINE LUMBAR | Status: FUNCTIONAL

## 2024-05-13 DEVICE — BONE GRAFT KIT 7510200 INFUSE SMALL
Type: IMPLANTABLE DEVICE | Site: SPINE LUMBAR | Status: FUNCTIONAL
Brand: INFUSE® BONE GRAFT

## 2024-05-13 DEVICE — SCREW, RELINE-O, 7.5X40MM 2S POLYAXIAL: Type: IMPLANTABLE DEVICE | Site: SPINE LUMBAR | Status: FUNCTIONAL

## 2024-05-13 DEVICE — BONE GRAFT KIT 7510800 INFUSE LARGE II
Type: IMPLANTABLE DEVICE | Site: SPINE LUMBAR | Status: FUNCTIONAL
Brand: INFUSE® BONE GRAFT

## 2024-05-13 DEVICE — SCREW, RELINE-O, 8.5X90MM 2S POLY ILIAC: Type: IMPLANTABLE DEVICE | Site: SPINE LUMBAR | Status: FUNCTIONAL

## 2024-05-13 DEVICE — SCREW, RELINE LOCK, 5.5MM OPEN TULIP: Type: IMPLANTABLE DEVICE | Site: SPINE LUMBAR | Status: FUNCTIONAL

## 2024-05-13 DEVICE — IMPLANTABLE DEVICE: Type: IMPLANTABLE DEVICE | Site: SPINE LUMBAR | Status: FUNCTIONAL

## 2024-05-13 DEVICE — SCREW, RELINE-O, 6.5X45MM 2S POLYAXIAL: Type: IMPLANTABLE DEVICE | Site: SPINE LUMBAR | Status: FUNCTIONAL

## 2024-05-13 RX ORDER — HEPARIN SODIUM 5000 [USP'U]/ML
5000 INJECTION, SOLUTION INTRAVENOUS; SUBCUTANEOUS EVERY 8 HOURS
Status: DISCONTINUED | OUTPATIENT
Start: 2024-05-13 | End: 2024-05-21 | Stop reason: HOSPADM

## 2024-05-13 RX ORDER — LIDOCAINE HYDROCHLORIDE AND EPINEPHRINE 10; 10 MG/ML; UG/ML
INJECTION, SOLUTION INFILTRATION; PERINEURAL AS NEEDED
Status: DISCONTINUED | OUTPATIENT
Start: 2024-05-13 | End: 2024-05-13 | Stop reason: HOSPADM

## 2024-05-13 RX ORDER — PHENYLEPHRINE HCL IN 0.9% NACL 0.4MG/10ML
SYRINGE (ML) INTRAVENOUS AS NEEDED
Status: DISCONTINUED | OUTPATIENT
Start: 2024-05-13 | End: 2024-05-13

## 2024-05-13 RX ORDER — NALOXONE HYDROCHLORIDE 0.4 MG/ML
0.2 INJECTION, SOLUTION INTRAMUSCULAR; INTRAVENOUS; SUBCUTANEOUS AS NEEDED
Status: DISCONTINUED | OUTPATIENT
Start: 2024-05-13 | End: 2024-05-21 | Stop reason: HOSPADM

## 2024-05-13 RX ORDER — VANCOMYCIN HYDROCHLORIDE 1 G/20ML
INJECTION, POWDER, LYOPHILIZED, FOR SOLUTION INTRAVENOUS AS NEEDED
Status: DISCONTINUED | OUTPATIENT
Start: 2024-05-13 | End: 2024-05-13 | Stop reason: HOSPADM

## 2024-05-13 RX ORDER — SODIUM CHLORIDE, SODIUM LACTATE, POTASSIUM CHLORIDE, CALCIUM CHLORIDE 600; 310; 30; 20 MG/100ML; MG/100ML; MG/100ML; MG/100ML
INJECTION, SOLUTION INTRAVENOUS CONTINUOUS PRN
Status: DISCONTINUED | OUTPATIENT
Start: 2024-05-13 | End: 2024-05-13

## 2024-05-13 RX ORDER — OXYCODONE HYDROCHLORIDE 5 MG/1
5 TABLET ORAL EVERY 4 HOURS PRN
Status: DISCONTINUED | OUTPATIENT
Start: 2024-05-13 | End: 2024-05-13 | Stop reason: HOSPADM

## 2024-05-13 RX ORDER — HYDROMORPHONE HYDROCHLORIDE 1 MG/ML
INJECTION, SOLUTION INTRAMUSCULAR; INTRAVENOUS; SUBCUTANEOUS AS NEEDED
Status: DISCONTINUED | OUTPATIENT
Start: 2024-05-13 | End: 2024-05-13

## 2024-05-13 RX ORDER — ONDANSETRON HYDROCHLORIDE 2 MG/ML
INJECTION, SOLUTION INTRAVENOUS AS NEEDED
Status: DISCONTINUED | OUTPATIENT
Start: 2024-05-13 | End: 2024-05-13

## 2024-05-13 RX ORDER — SODIUM CHLORIDE, SODIUM LACTATE, POTASSIUM CHLORIDE, CALCIUM CHLORIDE 600; 310; 30; 20 MG/100ML; MG/100ML; MG/100ML; MG/100ML
100 INJECTION, SOLUTION INTRAVENOUS CONTINUOUS
Status: DISCONTINUED | OUTPATIENT
Start: 2024-05-13 | End: 2024-05-13 | Stop reason: HOSPADM

## 2024-05-13 RX ORDER — ONDANSETRON HYDROCHLORIDE 2 MG/ML
4 INJECTION, SOLUTION INTRAVENOUS ONCE AS NEEDED
Status: DISCONTINUED | OUTPATIENT
Start: 2024-05-13 | End: 2024-05-13 | Stop reason: HOSPADM

## 2024-05-13 RX ORDER — LIDOCAINE HYDROCHLORIDE 20 MG/ML
INJECTION, SOLUTION INFILTRATION; PERINEURAL AS NEEDED
Status: DISCONTINUED | OUTPATIENT
Start: 2024-05-13 | End: 2024-05-13

## 2024-05-13 RX ORDER — POLYMYXIN B 500000 [USP'U]/1
INJECTION, POWDER, LYOPHILIZED, FOR SOLUTION INTRAMUSCULAR; INTRATHECAL; INTRAVENOUS; OPHTHALMIC AS NEEDED
Status: DISCONTINUED | OUTPATIENT
Start: 2024-05-13 | End: 2024-05-13 | Stop reason: HOSPADM

## 2024-05-13 RX ORDER — BACITRACIN ZINC 500 UNIT/G
OINTMENT IN PACKET (EA) TOPICAL AS NEEDED
Status: DISCONTINUED | OUTPATIENT
Start: 2024-05-13 | End: 2024-05-13 | Stop reason: HOSPADM

## 2024-05-13 RX ORDER — PHENYLEPHRINE 10 MG/250 ML(40 MCG/ML)IN 0.9 % SOD.CHLORIDE INTRAVENOUS
CONTINUOUS PRN
Status: DISCONTINUED | OUTPATIENT
Start: 2024-05-13 | End: 2024-05-13

## 2024-05-13 RX ORDER — PROPOFOL 10 MG/ML
INJECTION, EMULSION INTRAVENOUS AS NEEDED
Status: DISCONTINUED | OUTPATIENT
Start: 2024-05-13 | End: 2024-05-13

## 2024-05-13 RX ORDER — CEFAZOLIN 1 G/1
INJECTION, POWDER, FOR SOLUTION INTRAVENOUS AS NEEDED
Status: DISCONTINUED | OUTPATIENT
Start: 2024-05-13 | End: 2024-05-13 | Stop reason: HOSPADM

## 2024-05-13 RX ORDER — ACETAMINOPHEN 325 MG/1
650 TABLET ORAL EVERY 6 HOURS
Status: DISCONTINUED | OUTPATIENT
Start: 2024-05-13 | End: 2024-05-21 | Stop reason: HOSPADM

## 2024-05-13 RX ORDER — HYDROMORPHONE HCL/0.9% NACL/PF 15 MG/30ML
PATIENT CONTROLLED ANALGESIA SYRINGE INTRAVENOUS CONTINUOUS
Status: DISCONTINUED | OUTPATIENT
Start: 2024-05-13 | End: 2024-05-14

## 2024-05-13 RX ORDER — HYDROMORPHONE HYDROCHLORIDE 1 MG/ML
0.5 INJECTION, SOLUTION INTRAMUSCULAR; INTRAVENOUS; SUBCUTANEOUS EVERY 5 MIN PRN
Status: DISCONTINUED | OUTPATIENT
Start: 2024-05-13 | End: 2024-05-13 | Stop reason: HOSPADM

## 2024-05-13 RX ORDER — CEFAZOLIN 1 G/1
INJECTION, POWDER, FOR SOLUTION INTRAVENOUS AS NEEDED
Status: DISCONTINUED | OUTPATIENT
Start: 2024-05-13 | End: 2024-05-13

## 2024-05-13 RX ORDER — KETOROLAC TROMETHAMINE 30 MG/ML
15 INJECTION, SOLUTION INTRAMUSCULAR; INTRAVENOUS EVERY 6 HOURS
Status: COMPLETED | OUTPATIENT
Start: 2024-05-13 | End: 2024-05-15

## 2024-05-13 RX ORDER — FENTANYL CITRATE 50 UG/ML
INJECTION, SOLUTION INTRAMUSCULAR; INTRAVENOUS AS NEEDED
Status: DISCONTINUED | OUTPATIENT
Start: 2024-05-13 | End: 2024-05-13

## 2024-05-13 RX ORDER — LIDOCAINE HYDROCHLORIDE 10 MG/ML
0.1 INJECTION INFILTRATION; PERINEURAL ONCE
Status: DISCONTINUED | OUTPATIENT
Start: 2024-05-13 | End: 2024-05-13 | Stop reason: HOSPADM

## 2024-05-13 RX ORDER — ROCURONIUM BROMIDE 10 MG/ML
INJECTION, SOLUTION INTRAVENOUS AS NEEDED
Status: DISCONTINUED | OUTPATIENT
Start: 2024-05-13 | End: 2024-05-13

## 2024-05-13 RX ADMIN — CEFAZOLIN 2 G: 1 INJECTION, POWDER, FOR SOLUTION INTRAMUSCULAR; INTRAVENOUS at 12:45

## 2024-05-13 RX ADMIN — SIMVASTATIN 40 MG: 20 TABLET, FILM COATED ORAL at 21:01

## 2024-05-13 RX ADMIN — ONDANSETRON 4 MG: 2 INJECTION INTRAMUSCULAR; INTRAVENOUS at 13:01

## 2024-05-13 RX ADMIN — HEPARIN SODIUM 5000 UNITS: 5000 INJECTION INTRAVENOUS; SUBCUTANEOUS at 16:42

## 2024-05-13 RX ADMIN — KETOROLAC TROMETHAMINE 15 MG: 30 INJECTION, SOLUTION INTRAMUSCULAR; INTRAVENOUS at 16:42

## 2024-05-13 RX ADMIN — HYDROMORPHONE HYDROCHLORIDE 0.3 MG: 1 INJECTION, SOLUTION INTRAMUSCULAR; INTRAVENOUS; SUBCUTANEOUS at 13:43

## 2024-05-13 RX ADMIN — KETOROLAC TROMETHAMINE 15 MG: 30 INJECTION, SOLUTION INTRAMUSCULAR; INTRAVENOUS at 23:14

## 2024-05-13 RX ADMIN — LORAZEPAM 1.5 MG: 0.5 TABLET ORAL at 23:13

## 2024-05-13 RX ADMIN — SODIUM CHLORIDE 75 ML/HR: 9 INJECTION, SOLUTION INTRAVENOUS at 00:18

## 2024-05-13 RX ADMIN — HYDROMORPHONE HYDROCHLORIDE 0.2 MG: 1 INJECTION, SOLUTION INTRAMUSCULAR; INTRAVENOUS; SUBCUTANEOUS at 13:02

## 2024-05-13 RX ADMIN — HEPARIN SODIUM 5000 UNITS: 5000 INJECTION INTRAVENOUS; SUBCUTANEOUS at 23:23

## 2024-05-13 RX ADMIN — Medication 80 MCG: at 10:54

## 2024-05-13 RX ADMIN — LIDOCAINE HYDROCHLORIDE 60 MG: 20 INJECTION, SOLUTION INFILTRATION; PERINEURAL at 07:51

## 2024-05-13 RX ADMIN — PROPOFOL 20 MG: 10 INJECTION, EMULSION INTRAVENOUS at 08:34

## 2024-05-13 RX ADMIN — Medication 80 MCG: at 10:37

## 2024-05-13 RX ADMIN — ROCURONIUM 50 MG: 50 INJECTION, SOLUTION INTRAVENOUS at 07:51

## 2024-05-13 RX ADMIN — HYDROMORPHONE HYDROCHLORIDE 0.5 MG: 1 INJECTION, SOLUTION INTRAMUSCULAR; INTRAVENOUS; SUBCUTANEOUS at 13:33

## 2024-05-13 RX ADMIN — SODIUM CHLORIDE, SODIUM LACTATE, POTASSIUM CHLORIDE, CALCIUM CHLORIDE: 600; 310; 30; 20 INJECTION, SOLUTION INTRAVENOUS at 09:02

## 2024-05-13 RX ADMIN — PROPOFOL 75 MCG/KG/MIN: 10 INJECTION, EMULSION INTRAVENOUS at 09:00

## 2024-05-13 RX ADMIN — PROPOFOL 25 MCG/KG/MIN: 10 INJECTION, EMULSION INTRAVENOUS at 09:37

## 2024-05-13 RX ADMIN — HYDROCORTISONE SODIUM SUCCINATE 100 MG: 100 INJECTION, POWDER, FOR SOLUTION INTRAMUSCULAR; INTRAVENOUS at 07:51

## 2024-05-13 RX ADMIN — Medication 100 MCG: at 09:21

## 2024-05-13 RX ADMIN — SODIUM CHLORIDE, POTASSIUM CHLORIDE, SODIUM LACTATE AND CALCIUM CHLORIDE 100 ML/HR: 600; 310; 30; 20 INJECTION, SOLUTION INTRAVENOUS at 13:15

## 2024-05-13 RX ADMIN — PROPOFOL 40 MG: 10 INJECTION, EMULSION INTRAVENOUS at 08:38

## 2024-05-13 RX ADMIN — Medication 80 MCG: at 11:23

## 2024-05-13 RX ADMIN — Medication 0.4 MCG/KG/MIN: at 11:25

## 2024-05-13 RX ADMIN — SENNOSIDES AND DOCUSATE SODIUM 2 TABLET: 8.6; 5 TABLET ORAL at 21:02

## 2024-05-13 RX ADMIN — Medication: at 14:14

## 2024-05-13 RX ADMIN — CEFAZOLIN 2 G: 1 INJECTION, POWDER, FOR SOLUTION INTRAMUSCULAR; INTRAVENOUS at 08:43

## 2024-05-13 RX ADMIN — Medication 80 MCG: at 11:11

## 2024-05-13 RX ADMIN — PROPOFOL 150 MG: 10 INJECTION, EMULSION INTRAVENOUS at 07:51

## 2024-05-13 RX ADMIN — SODIUM CHLORIDE 855 MG: 9 INJECTION, SOLUTION INTRAVENOUS at 08:57

## 2024-05-13 RX ADMIN — METHOCARBAMOL 500 MG: 500 TABLET ORAL at 21:02

## 2024-05-13 RX ADMIN — Medication 120 MCG: at 13:18

## 2024-05-13 RX ADMIN — ROCURONIUM 20 MG: 50 INJECTION, SOLUTION INTRAVENOUS at 09:04

## 2024-05-13 RX ADMIN — REMIFENTANIL HYDROCHLORIDE 0.05 MCG/KG/MIN: 1 INJECTION, POWDER, LYOPHILIZED, FOR SOLUTION INTRAVENOUS at 11:10

## 2024-05-13 RX ADMIN — SUGAMMADEX 200 MG: 100 INJECTION, SOLUTION INTRAVENOUS at 11:10

## 2024-05-13 RX ADMIN — ACETAMINOPHEN 650 MG: 325 TABLET ORAL at 23:15

## 2024-05-13 RX ADMIN — HYDROMORPHONE HYDROCHLORIDE 0.5 MG: 1 INJECTION, SOLUTION INTRAMUSCULAR; INTRAVENOUS; SUBCUTANEOUS at 14:06

## 2024-05-13 RX ADMIN — SODIUM CHLORIDE 1000 ML: 9 INJECTION, SOLUTION INTRAVENOUS at 21:01

## 2024-05-13 RX ADMIN — ROCURONIUM 10 MG: 50 INJECTION, SOLUTION INTRAVENOUS at 10:27

## 2024-05-13 RX ADMIN — HYDROMORPHONE HYDROCHLORIDE 0.5 MG: 1 INJECTION, SOLUTION INTRAMUSCULAR; INTRAVENOUS; SUBCUTANEOUS at 13:56

## 2024-05-13 RX ADMIN — PROPOFOL 20 MG: 10 INJECTION, EMULSION INTRAVENOUS at 07:54

## 2024-05-13 RX ADMIN — FENTANYL CITRATE 100 MCG: 50 INJECTION, SOLUTION INTRAMUSCULAR; INTRAVENOUS at 07:51

## 2024-05-13 RX ADMIN — ACETAMINOPHEN 650 MG: 325 TABLET ORAL at 16:42

## 2024-05-13 RX ADMIN — GABAPENTIN 100 MG: 100 CAPSULE ORAL at 21:01

## 2024-05-13 RX ADMIN — HYDROMORPHONE HYDROCHLORIDE 0.5 MG: 1 INJECTION, SOLUTION INTRAMUSCULAR; INTRAVENOUS; SUBCUTANEOUS at 14:11

## 2024-05-13 RX ADMIN — SODIUM CHLORIDE 75 ML/HR: 9 INJECTION, SOLUTION INTRAVENOUS at 16:47

## 2024-05-13 RX ADMIN — SODIUM CHLORIDE, POTASSIUM CHLORIDE, SODIUM LACTATE AND CALCIUM CHLORIDE: 600; 310; 30; 20 INJECTION, SOLUTION INTRAVENOUS at 07:34

## 2024-05-13 RX ADMIN — HYDROMORPHONE HYDROCHLORIDE 0.5 MG: 1 INJECTION, SOLUTION INTRAMUSCULAR; INTRAVENOUS; SUBCUTANEOUS at 14:01

## 2024-05-13 SDOH — HEALTH STABILITY: MENTAL HEALTH: CURRENT SMOKER: 0

## 2024-05-13 ASSESSMENT — PAIN SCALES - GENERAL
PAINLEVEL_OUTOF10: 7
PAINLEVEL_OUTOF10: 9
PAIN_LEVEL: 4
PAINLEVEL_OUTOF10: 10 - WORST POSSIBLE PAIN
PAINLEVEL_OUTOF10: 8
PAINLEVEL_OUTOF10: 9
PAINLEVEL_OUTOF10: 7
PAINLEVEL_OUTOF10: 10 - WORST POSSIBLE PAIN

## 2024-05-13 ASSESSMENT — PAIN - FUNCTIONAL ASSESSMENT
PAIN_FUNCTIONAL_ASSESSMENT: 0-10
PAIN_FUNCTIONAL_ASSESSMENT: UNABLE TO SELF-REPORT
PAIN_FUNCTIONAL_ASSESSMENT: 0-10
PAIN_FUNCTIONAL_ASSESSMENT: 0-10

## 2024-05-13 ASSESSMENT — PAIN DESCRIPTION - DESCRIPTORS: DESCRIPTORS: BURNING;ACHING;TINGLING

## 2024-05-13 NOTE — PROGRESS NOTES
"Michelle Jack is a 73 y.o. female on day 5 of admission presenting with Cervical myelopathy (Multi).    Subjective     No events overnight.       Objective     Physical Exam    RUE D4 T4 B4 HG4 IO3  RLE HF4+ KE/DF/PF 5  LUE D4- T4- B/HG 4 IO3  LLE HF 4- KE/DF/PF 5  BLE spasticity    Last Recorded Vitals  Blood pressure 103/68, pulse 76, temperature 36.2 °C (97.2 °F), resp. rate 18, height 1.626 m (5' 4\"), weight 57.4 kg (126 lb 8 oz), SpO2 96%.  Intake/Output last 3 Shifts:  No intake/output data recorded.    Relevant Results                Assessment/Plan   Principal Problem:    Cervical myelopathy (Multi)  Active Problems:    Status post spinal surgery    Sagittal plane imbalance    Spondylolisthesis, lumbar region    Lumbar spine instability    Lumbar foraminal stenosis    Lumbar spinal stenosis due to adjacent segment disease after fusion procedure    Pseudarthrosis following spinal fusion    Michelle Jack is a 73 y.o. female with h/o HTN, HLD, GERD, RA, IBD, GI ulcer p/w back pain and BLE weakness (L >R) x 5mo, MRI C6-7 severe stenosis w/ kyphotic deformity.     Plan:    Floor  OR Mon 5/13 extension/revision to T10  ASA restart plan  Sidagam recs-elevated but acceptable risk, beta blocker through OR  PTOT  SCDs, SQH           Kevan Barros MD      "

## 2024-05-13 NOTE — OP NOTE
Removal of previous L3-5 instrumentation, exploration of previous fusion, L5-S1 transforaminal lumbar interbody fusion, L4-5 posterior column osteotomy, L2-3 laminectomy and facetectomy for decompression with the T11-S1 instrumentation and fusion with pelvic fixation. using local bone autograft and rhBMP (B) Operative Note     Date: 2024  OR Location: [facility] OR    Name: Michelle Jack, : 1950, Age: 73 y.o., MRN: [id number], Sex: female    Diagnosis  Pre-op Diagnosis     * Status post spinal surgery [Z98.890]     * Sagittal plane imbalance [M43.8X9]     * Spondylolisthesis, lumbar region [M43.16]     * Lumbar spine instability [M53.2X6]     * Lumbar foraminal stenosis [M48.061]     * Lumbar spinal stenosis due to adjacent segment disease after fusion procedure [M48.061, M51.36]     * Pseudarthrosis following spinal fusion [M96.0] Post-op Diagnosis     * Status post spinal surgery [Z98.890]     * Sagittal plane imbalance [M43.8X9]     * Spondylolisthesis, lumbar region [M43.16]     * Lumbar spine instability [M53.2X6]     * Lumbar foraminal stenosis [M48.061]     * Lumbar spinal stenosis due to adjacent segment disease after fusion procedure [M48.061, M51.36]     * Pseudarthrosis following spinal fusion [M96.0]     Procedures  Posterior Column Osteotomy L4-5  2.   L2-3 laminectomy and Facetectomy with discectomy for Decompression   3.   L5-S1  Combined Posterolateral and Transforaminal Interbody Fusion (TLIF)    4.   Synthetic Titanium Cage Device L5-S1   5.   Posterolateral Fusion T11-T12, T12-L1 L1-L2 L2-L3 L4-L5   6.   Exploration of previous L3-5 fusion  7.   Posterior Segmental Instrumentation T11 to S1   8.   Placement of Pelvic Fixation,   9.   Local Bone Graft, Synthetic Bone Graft Extender/Substitute/Osteopromotive Material   10. Use of intraoperative image-guided computer-assisted navigation.     SURGEON:    Clint Cheung MD    RESIDENT:  Cliff Hoover MD    MEDICATIONS:     Antibiotic prophylaxis given within one hour prior to skin incision.    PROPHYLAXIS:    Venous thromboembolism prophylaxis was ordered at the time of surgery in the form of mechanical prophylaxis using sequential compression devices.    INDICATION:  Ms. Jack is a 73 y.o. female who presented with intractable low back and lower extremity symptoms with good correlation between the imaging studies and clinical signs and symptoms.  She underwent previous to lumbar spine surgery with L3-5 fusion at this point with clear evidence of severe stenosis at L2-3 level along with sagittal plane deformity resulting in positive sagittal balance that was severely symptomatic.  The patient failed all conservative treatment. In view of the presence of significant symptoms affecting the activities of daily living to a significant extent,  surgical treatment was discussed with the goals, risk and benefits of surgery and the fact that surgery may or may not alleviate the symptoms completely with small chances of even worsening of the symptoms.  The patient chose to proceed with surgery after clear understanding of all the risk and benefits and goals of surgery.    TECHNIQUE:    After induction of general anesthesia, the patient was carefully turned prone on a Keith table and all dependent portions were carefully padded. Neuromonitoring was instituted using SSEP and the free running EMGs. The thoracolumbar region was scrubbed, prepped, and draped in the usual sterile fashion.  A midline skin incision was marked out from T11 to S1 based on lateral fluoroscopy incorporating the previous incision. This was injected with local anesthesia and carried out sharply.  Subcutaneous tissue divided using monopolar cautery over the tips of the spinous process and carried out all the way to the tip of the transverse processes of T11 through L5 and over the sacral ala and dorsal sacrum bilaterally including upto S2 level.  Very obvious and  significant scarring was evident from previous surgery.  At this point of time previous instrumentation was very well exposed.  The previous set caps were removed followed by removal of the rods.  Subsequently distraction was placed across the L3-4 and L4-5 segment to assess for presence of pseudarthrosis.  The fusion seems to be solid at L3-4 level but there was evidence of motion at L4-5 segment suggestive of pseudarthrosis.  Spinous process clamp was attached to the T11 spinous process and the dynamic reference array was attached to this. The O-arm was brought into the field and intraoperative CT scan performed and the images transferred to the Metrigo system for use in intraoperative image-guided computer-assisted navigation. The navigation system was then used to place the instrumentation at the bilateral pedicles from T11-S1 using NuIntuiLab reline OPEN instrumented system. Placement of iliac fixation bilaterally was done using traditional iliac screw trajectory. Neuro monitoring was performed throughout the placement of instrumentation and that worse no abnormal electrophysiological activity detected.  Attention was then directed to performance of the TLIF. The inferior edge of the lamina along with lateral border of the pars along with the L5-S1 facet joint identified.  Left-sided Laminectomy and facetectomy removing the inferior facet of L5 and superior facet of S1 was performed using drill, osteotomes and kerrison rongeurs  to expose the disc space.  The disk space was accessed on the left hand side and incised sharply. Using a series of curettes and pituitary rongeurs, an aggressive and complete discectomy was performed. After completing the diskectomy, the endplates of L5 and S1 were prepared for interbody arthrodesis. The wound was irrigated and hemostasis achieved. The disk space was first packed with one half of a small rhBMP sponge and local morcellized autograft from the decompression as  mentioned above. An expandable lordotic titanium cage was packed with local autograft and inserted into the intervertebral space under fluoroscopy. AP and lateral fluoroscopy demonstrated the cage to be in excellent position.   L2-3 laminectomy and facetectomy was then performed with decompression of the central canal.  Following removal of the facet on the left side very large disc herniation was also identified that was removed using micropituitary and curettes.  At this point of time the L2-3 segment along with the left L2-3 foramen was completely decompressed.    Posterior column osteotomy was then performed at the L4-5 level that involve removal of the left lower bilateral inferior facet of L4 and the superior facet of L5 close the previously performed a fusion to allow correction of the deformity. Thorough irrigation was performed after achieving hemostasis. The posterolateral elements of T11 through S1 were decorticated using a high-speed drill. The pre-bent 6.0 to 5.5 mm transition rods that wear manufactured using Solulink software system with appropriate correction of spinal deformity and available to use were then seated in the heads of the screws and through a combination of roxane translation, cantilever and compression excellent correction of deformity with restoration of sagittal plane alignment was performed.  The rods were secured in place using set caps and final tightened using the torque  and counter-torque. Final AP and lateral fluoroscopic images were taken to confirm satisfactory implant  placement. The posterolateral arthrodesis from T11 - S1 was completed by  laying down morselized autograft that was obtained from the bony decompression and rhBMP. Two grams of vancomycin and one grams of ancef powder was scattered about the wound and two Hemovac drains tunneled out percutaneously from the wound. The wound was then closed in layers using a running #1 Vicryl for the muscle, 0- Vicryl for  the fascia, interrupted inverted 2-0 Vicryl for the subcutaneous layer, and glue  for the skin. The drapes were removed, sterile dressing applied. The patient was  turned back supine. There were no changes in the neuro monitoring throughout the course of the operation.Please also note, I was scrubbed, present and performed the entire procedure. The EBL was about 500 ml     Complications:  None; patient tolerated the procedure well.    Disposition: PACU - hemodynamically stable.  Condition: stable       Attending Attestation: I was present for the entire procedure.    Clint Cheung  Phone Number: [phone]

## 2024-05-13 NOTE — ANESTHESIA POSTPROCEDURE EVALUATION
Patient: Michelle Jack    Procedure Summary       Date: 05/13/24 Room / Location: [facility] OR 06 / Virtual [facility] OR    Anesthesia Start: 0743 Anesthesia Stop: 1357    Procedure: Removal of previous L3-5 instrumentation, exploration of previous fusion, L5-S1 and L2-3 decompression and transforaminal lumbar interbody fusion, L3-4 posterior column osteotomy, L4-5 laminectomy and facetectomy for decompression with the T11-S1 instrumentation and fusion with pelvic fixation. using local bone autograft and rhBMP (Bilateral: Spine Lumbar) Diagnosis:       Status post spinal surgery      Sagittal plane imbalance      Spondylolisthesis, lumbar region      Lumbar spine instability      Lumbar foraminal stenosis      Lumbar spinal stenosis due to adjacent segment disease after fusion procedure      Pseudarthrosis following spinal fusion      (Status post spinal surgery [Z98.890])      (Sagittal plane imbalance [M43.8X9])      (Spondylolisthesis, lumbar region [M43.16])      (Lumbar spine instability [M53.2X6])      (Lumbar foraminal stenosis [M48.061])      (Lumbar spinal stenosis due to adjacent segment disease after fusion procedure [M48.061, M51.36])      (Pseudarthrosis following spinal fusion [M96.0])    Surgeons: Clint Cheugn MD Responsible Provider: Bebo Ray MD    Anesthesia Type: general ASA Status: 3            Anesthesia Type: general    Vitals Value Taken Time   BP 99/62 05/13/24 1437   Temp 36 °C (96.8 °F) 05/13/24 1339   Pulse 80 05/13/24 1444   Resp 9 05/13/24 1444   SpO2 95 % 05/13/24 1444   Vitals shown include unfiled device data.    Anesthesia Post Evaluation    Patient location during evaluation: PACU  Patient participation: complete - patient participated  Level of consciousness: awake and alert  Pain score: 4  Pain management: adequate  Airway patency: patent  Cardiovascular status: acceptable  Respiratory status: acceptable  Hydration status: acceptable  Postoperative Nausea and  Vomiting: none        There were no known notable events for this encounter.

## 2024-05-13 NOTE — ANESTHESIA PROCEDURE NOTES
Peripheral IV  Date/Time: 5/13/2024 9:25 AM  Inserted by: TRUE Parks-CYNTHIA    Placement  Needle size: 22 G  Laterality: left  Location: wrist  Site prep: alcohol  Technique: anatomical landmarks  Attempts: 1

## 2024-05-13 NOTE — BRIEF OP NOTE
Date: 2024 - 2024  OR Location: [facility] OR    Name: Michelle Jack, : 1950, Age: 73 y.o., MRN: [id number], Sex: female    Diagnosis  Pre-op Diagnosis     * Status post spinal surgery [Z98.890]     * Sagittal plane imbalance [M43.8X9]     * Spondylolisthesis, lumbar region [M43.16]     * Lumbar spine instability [M53.2X6]     * Lumbar foraminal stenosis [M48.061]     * Lumbar spinal stenosis due to adjacent segment disease after fusion procedure [M48.061, M51.36]     * Pseudarthrosis following spinal fusion [M96.0] Post-op Diagnosis     * Status post spinal surgery [Z98.890]     * Sagittal plane imbalance [M43.8X9]     * Spondylolisthesis, lumbar region [M43.16]     * Lumbar spine instability [M53.2X6]     * Lumbar foraminal stenosis [M48.061]     * Lumbar spinal stenosis due to adjacent segment disease after fusion procedure [M48.061, M51.36]     * Pseudarthrosis following spinal fusion [M96.0]     Procedures  Removal of prior L3-L5 instrumentation  T11-pelvis instrumentation  L2-3 laminectomy  L2-3 microdiscectomy  L5-S1 TLIF  L4-5 PCO  L2-3 PCO  Use of O arm  Use of C arm  Surgeons      * Clint Cheung - Primary    Resident/Fellow/Other Assistant:  Surgeons and Role:     * Cliff Hoover MD - Resident - Assisting    Procedure Summary  Anesthesia: General  ASA: III  Anesthesia Staff: Anesthesiologist: Bebo Ray MD  CRNA: TRUE Parks-CRNA  C-AA: BRITTANI Bhatia  Estimated Blood Loss: 400mL  Intra-op Medications:   Administrations occurring from 0715 to 1150 on 24:   Medication Name Total Dose   gelatin absorbable (Gelfoam) 100 sponge 1 each   thrombin (recombinant) (Recothrom) topical solution 10,000 Units   polymyxin B injection 1,000,000 Units   lidocaine-epinephrine (Xylocaine W/EPI) 1 %-1:100,000 injection 10 mL   amLODIPine (Norvasc) tablet 5 mg Cannot be calculated   budesonide EC (Entocort EC) 24 hr capsule 9 mg Cannot be calculated   cevimeline (Evoxac)  capsule 30 mg Cannot be calculated   fenofibrate (Tricor) tablet 54 mg Cannot be calculated   gabapentin (Neurontin) capsule 100 mg Cannot be calculated   lisinopril tablet 10 mg Cannot be calculated   methocarbamol (Robaxin) tablet 500 mg Cannot be calculated   metoprolol tartrate (Lopressor) tablet 12.5 mg Cannot be calculated   miSOPROStoL (Cytotec) tablet 200 mcg Cannot be calculated   polyethylene glycol (Glycolax, Miralax) packet 17 g Cannot be calculated   predniSONE (Deltasone) tablet 3 mg Cannot be calculated   sennosides-docusate sodium (Kristyn-Colace) 8.6-50 mg per tablet 2 tablet Cannot be calculated   timolol (Timoptic) 0.5 % ophthalmic solution 1 drop Cannot be calculated   venlafaxine (Effexor) tablet 75 mg Cannot be calculated              Anesthesia Record               Intraprocedure I/O Totals          Intake    Remifentanil Drip 0.00 mL    The total shown is the total volume documented since Anesthesia Start was filed.    Tranexamic Acid 0.00 mL    The total shown is the total volume documented since Anesthesia Start was filed.    Propofol Drip 0.00 mL    The total shown is the total volume documented since Anesthesia Start was filed.    Phenylephrine Drip 0.00 mL    The total shown is the total volume documented since Anesthesia Start was filed.    LR infusion 650.00 mL    Total Intake 650 mL       Output    Urine 155 mL    Est. Blood Loss 400 mL    Total Output 555 mL       Net    Net Volume 95 mL          Specimen: No specimens collected     Staff:   Circulator: Susanne Mosley RN  Relief Circulator: Leigh Rosario RN  Relief Scrub: Dontae Tan  Scrub Person: Curtis Peña; Noris GoncalvesFirelands Regional Medical Centerneelima          Findings: good decompression, correction of deformity    Complications:  None; patient tolerated the procedure well.     Disposition: PACU - hemodynamically stable.  Condition: stable  Specimens Collected: No specimens collected  Attending Attestation:     Clint Cheung  Phone Number:  [phone]

## 2024-05-13 NOTE — ANESTHESIA PROCEDURE NOTES
Arterial Line:    Date/Time: 5/13/2024 8:15 AM    Staffing  Performed: BRITTANI   Authorized by: Bebo Ray MD    Performed by: BRITTANI Bhatia    An arterial line was placed. Procedure performed using ultrasound guidance.in the OR for the following indication(s): continuous blood pressure monitoring.    A 20 gauge (size), 5 cm (length), Angiocath (type) catheter was placed into the Right radial artery, secured by Tegaderm,   Seldinger technique used.  Events:  patient tolerated procedure well with no complications.

## 2024-05-13 NOTE — ANESTHESIA PROCEDURE NOTES
Peripheral IV  Date/Time: 5/13/2024 10:44 AM  Inserted by: BRITTANI Bhatia    Placement  Needle size: 22 G  Laterality: right  Location: hand  Site prep: alcohol  Technique: anatomical landmarks  Attempts: 1

## 2024-05-13 NOTE — ANESTHESIA PREPROCEDURE EVALUATION
Patient: Michelle Jack    Procedure Information       Anesthesia Start Date/Time: 05/13/24 0743    Procedure: Removal of previous L3-5 instrumentation, exploration of previous fusion, L5-S1 and L2-3 decompression and transforaminal lumbar interbody fusion, L3-4 posterior column osteotomy, L4-5 laminectomy and facetectomy for decompression with the T11-S1 instrumentation and fusion with pelvic fixation. using local bone autograft and rhBMP (Bilateral: Spine Lumbar) - Please add CPT code 34218 and 13989 for decompression at L5/S1 and L2/3    Location: [facility] OR 06 / Virtual [facility] OR    Surgeons: Clint Cheung MD            Relevant Problems   Musculoskeletal   (+) Lumbar foraminal stenosis   (+) Lumbar spinal stenosis due to adjacent segment disease after fusion procedure       Clinical information reviewed:   Tobacco  Allergies  Meds   Med Hx  Surg Hx  OB Status  Fam Hx  Soc   Hx        NPO Detail:  NPO/Void Status  Date of Last Liquid: 05/13/24  Time of Last Liquid: 0000  Date of Last Solid: 05/12/24  Time of Last Solid: 2000  Last Intake Type: Clear fluids         Physical Exam    Airway  Mallampati: I  TM distance: >3 FB  Neck ROM: full     Cardiovascular - normal exam     Dental - normal exam     Pulmonary - normal exam     Abdominal - normal exam             Anesthesia Plan    History of general anesthesia?: yes  History of complications of general anesthesia?: no    ASA 3     general     The patient is not a current smoker.  Patient was not previously instructed to abstain from smoking on day of procedure.  Patient did not smoke on day of procedure.    intravenous induction   Postoperative administration of opioids is intended.  Anesthetic plan and risks discussed with patient.  Use of blood products discussed with patient who.    Plan discussed with CAA.

## 2024-05-13 NOTE — ANESTHESIA PROCEDURE NOTES
Peripheral IV  Date/Time: 5/13/2024 8:27 AM  Inserted by: BRITTANI Bhatia    Placement  Needle size: 18 G  Laterality: left  Location: hand  Site prep: alcohol  Technique: anatomical landmarks  Attempts: 1

## 2024-05-14 ENCOUNTER — APPOINTMENT (OUTPATIENT)
Dept: PREADMISSION TESTING | Facility: HOSPITAL | Age: 74
End: 2024-05-14
Payer: MEDICARE

## 2024-05-14 LAB
ALBUMIN SERPL BCP-MCNC: 2.5 G/DL (ref 3.4–5)
ALBUMIN SERPL BCP-MCNC: 2.6 G/DL (ref 3.4–5)
ALBUMIN SERPL BCP-MCNC: 3.2 G/DL (ref 3.4–5)
ANION GAP SERPL CALC-SCNC: 11 MMOL/L (ref 10–20)
ANION GAP SERPL CALC-SCNC: 12 MMOL/L (ref 10–20)
ANION GAP SERPL CALC-SCNC: 15 MMOL/L (ref 10–20)
BUN SERPL-MCNC: 14 MG/DL (ref 6–23)
BUN SERPL-MCNC: 17 MG/DL (ref 6–23)
BUN SERPL-MCNC: 18 MG/DL (ref 6–23)
CALCIUM SERPL-MCNC: 6.6 MG/DL (ref 8.6–10.6)
CALCIUM SERPL-MCNC: 7.4 MG/DL (ref 8.6–10.6)
CALCIUM SERPL-MCNC: 7.9 MG/DL (ref 8.6–10.6)
CHLORIDE SERPL-SCNC: 108 MMOL/L (ref 98–107)
CHLORIDE SERPL-SCNC: 111 MMOL/L (ref 98–107)
CHLORIDE SERPL-SCNC: 113 MMOL/L (ref 98–107)
CO2 SERPL-SCNC: 21 MMOL/L (ref 21–32)
CO2 SERPL-SCNC: 22 MMOL/L (ref 21–32)
CO2 SERPL-SCNC: 22 MMOL/L (ref 21–32)
CREAT SERPL-MCNC: 0.67 MG/DL (ref 0.5–1.05)
CREAT SERPL-MCNC: 0.83 MG/DL (ref 0.5–1.05)
CREAT SERPL-MCNC: 0.9 MG/DL (ref 0.5–1.05)
EGFRCR SERPLBLD CKD-EPI 2021: 68 ML/MIN/1.73M*2
EGFRCR SERPLBLD CKD-EPI 2021: 75 ML/MIN/1.73M*2
EGFRCR SERPLBLD CKD-EPI 2021: >90 ML/MIN/1.73M*2
ERYTHROCYTE [DISTWIDTH] IN BLOOD BY AUTOMATED COUNT: 14.3 % (ref 11.5–14.5)
ERYTHROCYTE [DISTWIDTH] IN BLOOD BY AUTOMATED COUNT: 16.6 % (ref 11.5–14.5)
ERYTHROCYTE [DISTWIDTH] IN BLOOD BY AUTOMATED COUNT: 16.8 % (ref 11.5–14.5)
ERYTHROCYTE [DISTWIDTH] IN BLOOD BY AUTOMATED COUNT: 16.8 % (ref 11.5–14.5)
GLUCOSE BLD MANUAL STRIP-MCNC: 79 MG/DL (ref 74–99)
GLUCOSE BLD MANUAL STRIP-MCNC: 90 MG/DL (ref 74–99)
GLUCOSE SERPL-MCNC: 124 MG/DL (ref 74–99)
GLUCOSE SERPL-MCNC: 70 MG/DL (ref 74–99)
GLUCOSE SERPL-MCNC: 85 MG/DL (ref 74–99)
HCT VFR BLD AUTO: 26 % (ref 36–46)
HCT VFR BLD AUTO: 26.6 % (ref 36–46)
HCT VFR BLD AUTO: 31.7 % (ref 36–46)
HCT VFR BLD AUTO: 34.7 % (ref 36–46)
HGB BLD-MCNC: 10.2 G/DL (ref 12–16)
HGB BLD-MCNC: 8 G/DL (ref 12–16)
HGB BLD-MCNC: 8.6 G/DL (ref 12–16)
HGB BLD-MCNC: 9.6 G/DL (ref 12–16)
MCH RBC QN AUTO: 28.4 PG (ref 26–34)
MCH RBC QN AUTO: 29.4 PG (ref 26–34)
MCH RBC QN AUTO: 29.7 PG (ref 26–34)
MCH RBC QN AUTO: 30.1 PG (ref 26–34)
MCHC RBC AUTO-ENTMCNC: 29.4 G/DL (ref 32–36)
MCHC RBC AUTO-ENTMCNC: 30.1 G/DL (ref 32–36)
MCHC RBC AUTO-ENTMCNC: 30.3 G/DL (ref 32–36)
MCHC RBC AUTO-ENTMCNC: 33.1 G/DL (ref 32–36)
MCV RBC AUTO: 100 FL (ref 80–100)
MCV RBC AUTO: 90 FL (ref 80–100)
MCV RBC AUTO: 97 FL (ref 80–100)
MCV RBC AUTO: 97 FL (ref 80–100)
NRBC BLD-RTO: 0 /100 WBCS (ref 0–0)
PHOSPHATE SERPL-MCNC: 2.8 MG/DL (ref 2.5–4.9)
PHOSPHATE SERPL-MCNC: 3.5 MG/DL (ref 2.5–4.9)
PHOSPHATE SERPL-MCNC: 3.7 MG/DL (ref 2.5–4.9)
PLATELET # BLD AUTO: 180 X10*3/UL (ref 150–450)
PLATELET # BLD AUTO: 192 X10*3/UL (ref 150–450)
PLATELET # BLD AUTO: 204 X10*3/UL (ref 150–450)
PLATELET # BLD AUTO: 217 X10*3/UL (ref 150–450)
POTASSIUM SERPL-SCNC: 3.7 MMOL/L (ref 3.5–5.3)
POTASSIUM SERPL-SCNC: 3.8 MMOL/L (ref 3.5–5.3)
POTASSIUM SERPL-SCNC: 4 MMOL/L (ref 3.5–5.3)
RBC # BLD AUTO: 2.66 X10*6/UL (ref 4–5.2)
RBC # BLD AUTO: 2.9 X10*6/UL (ref 4–5.2)
RBC # BLD AUTO: 3.27 X10*6/UL (ref 4–5.2)
RBC # BLD AUTO: 3.59 X10*6/UL (ref 4–5.2)
SODIUM SERPL-SCNC: 140 MMOL/L (ref 136–145)
SODIUM SERPL-SCNC: 141 MMOL/L (ref 136–145)
SODIUM SERPL-SCNC: 142 MMOL/L (ref 136–145)
WBC # BLD AUTO: 10.6 X10*3/UL (ref 4.4–11.3)
WBC # BLD AUTO: 11.1 X10*3/UL (ref 4.4–11.3)
WBC # BLD AUTO: 12.9 X10*3/UL (ref 4.4–11.3)
WBC # BLD AUTO: 9.3 X10*3/UL (ref 4.4–11.3)

## 2024-05-14 PROCEDURE — 85027 COMPLETE CBC AUTOMATED: CPT | Mod: 91

## 2024-05-14 PROCEDURE — 2500000004 HC RX 250 GENERAL PHARMACY W/ HCPCS (ALT 636 FOR OP/ED)

## 2024-05-14 PROCEDURE — 85027 COMPLETE CBC AUTOMATED: CPT | Performed by: STUDENT IN AN ORGANIZED HEALTH CARE EDUCATION/TRAINING PROGRAM

## 2024-05-14 PROCEDURE — P9016 RBC LEUKOCYTES REDUCED: HCPCS

## 2024-05-14 PROCEDURE — 1200000002 HC GENERAL ROOM WITH TELEMETRY DAILY

## 2024-05-14 PROCEDURE — 36415 COLL VENOUS BLD VENIPUNCTURE: CPT | Performed by: STUDENT IN AN ORGANIZED HEALTH CARE EDUCATION/TRAINING PROGRAM

## 2024-05-14 PROCEDURE — 2500000004 HC RX 250 GENERAL PHARMACY W/ HCPCS (ALT 636 FOR OP/ED): Performed by: STUDENT IN AN ORGANIZED HEALTH CARE EDUCATION/TRAINING PROGRAM

## 2024-05-14 PROCEDURE — 2500000004 HC RX 250 GENERAL PHARMACY W/ HCPCS (ALT 636 FOR OP/ED): Performed by: NURSE PRACTITIONER

## 2024-05-14 PROCEDURE — 2500000001 HC RX 250 WO HCPCS SELF ADMINISTERED DRUGS (ALT 637 FOR MEDICARE OP): Performed by: STUDENT IN AN ORGANIZED HEALTH CARE EDUCATION/TRAINING PROGRAM

## 2024-05-14 PROCEDURE — 2500000001 HC RX 250 WO HCPCS SELF ADMINISTERED DRUGS (ALT 637 FOR MEDICARE OP): Performed by: NURSE PRACTITIONER

## 2024-05-14 PROCEDURE — 80069 RENAL FUNCTION PANEL: CPT | Mod: 91,MUE

## 2024-05-14 PROCEDURE — 2500000006 HC RX 250 W HCPCS SELF ADMINISTERED DRUGS (ALT 637 FOR ALL PAYERS): Performed by: STUDENT IN AN ORGANIZED HEALTH CARE EDUCATION/TRAINING PROGRAM

## 2024-05-14 PROCEDURE — 97162 PT EVAL MOD COMPLEX 30 MIN: CPT | Mod: GP

## 2024-05-14 PROCEDURE — 80069 RENAL FUNCTION PANEL: CPT | Mod: 91,MUE | Performed by: STUDENT IN AN ORGANIZED HEALTH CARE EDUCATION/TRAINING PROGRAM

## 2024-05-14 PROCEDURE — 97530 THERAPEUTIC ACTIVITIES: CPT | Mod: GP

## 2024-05-14 PROCEDURE — 80069 RENAL FUNCTION PANEL: CPT | Performed by: STUDENT IN AN ORGANIZED HEALTH CARE EDUCATION/TRAINING PROGRAM

## 2024-05-14 PROCEDURE — 85027 COMPLETE CBC AUTOMATED: CPT | Mod: 91 | Performed by: NURSE PRACTITIONER

## 2024-05-14 PROCEDURE — 2500000005 HC RX 250 GENERAL PHARMACY W/O HCPCS: Performed by: STUDENT IN AN ORGANIZED HEALTH CARE EDUCATION/TRAINING PROGRAM

## 2024-05-14 PROCEDURE — 82947 ASSAY GLUCOSE BLOOD QUANT: CPT

## 2024-05-14 PROCEDURE — 36415 COLL VENOUS BLD VENIPUNCTURE: CPT

## 2024-05-14 PROCEDURE — 36430 TRANSFUSION BLD/BLD COMPNT: CPT

## 2024-05-14 RX ORDER — HYDROCORTISONE 20 MG/1
20 TABLET ORAL EVERY 8 HOURS SCHEDULED
Status: DISCONTINUED | OUTPATIENT
Start: 2024-05-14 | End: 2024-05-14

## 2024-05-14 RX ORDER — METOPROLOL TARTRATE 25 MG/1
12.5 TABLET, FILM COATED ORAL EVERY 8 HOURS PRN
Status: DISCONTINUED | OUTPATIENT
Start: 2024-05-14 | End: 2024-05-21 | Stop reason: HOSPADM

## 2024-05-14 RX ORDER — OXYCODONE HYDROCHLORIDE 5 MG/1
10 TABLET ORAL EVERY 4 HOURS PRN
Status: DISCONTINUED | OUTPATIENT
Start: 2024-05-14 | End: 2024-05-14

## 2024-05-14 RX ORDER — OXYCODONE HYDROCHLORIDE 5 MG/1
5 TABLET ORAL EVERY 4 HOURS PRN
Status: DISCONTINUED | OUTPATIENT
Start: 2024-05-14 | End: 2024-05-21 | Stop reason: HOSPADM

## 2024-05-14 RX ORDER — METOPROLOL TARTRATE 25 MG/1
12.5 TABLET, FILM COATED ORAL DAILY PRN
Status: DISCONTINUED | OUTPATIENT
Start: 2024-05-14 | End: 2024-05-14

## 2024-05-14 RX ORDER — HYDROMORPHONE HYDROCHLORIDE 1 MG/ML
0.2 INJECTION, SOLUTION INTRAMUSCULAR; INTRAVENOUS; SUBCUTANEOUS
Status: DISCONTINUED | OUTPATIENT
Start: 2024-05-14 | End: 2024-05-20

## 2024-05-14 RX ADMIN — OXYCODONE HYDROCHLORIDE 5 MG: 5 TABLET ORAL at 19:31

## 2024-05-14 RX ADMIN — BUDESONIDE 9 MG: 3 CAPSULE, COATED PELLETS ORAL at 08:35

## 2024-05-14 RX ADMIN — PREDNISONE 3 MG: 1 TABLET ORAL at 08:39

## 2024-05-14 RX ADMIN — KETOROLAC TROMETHAMINE 15 MG: 30 INJECTION, SOLUTION INTRAMUSCULAR; INTRAVENOUS at 16:32

## 2024-05-14 RX ADMIN — METOPROLOL TARTRATE 12.5 MG: 25 TABLET, FILM COATED ORAL at 10:22

## 2024-05-14 RX ADMIN — MISOPROSTOL 200 MCG: 200 TABLET ORAL at 08:39

## 2024-05-14 RX ADMIN — GABAPENTIN 100 MG: 100 CAPSULE ORAL at 20:33

## 2024-05-14 RX ADMIN — CEVIMELINE HYDROCHLORIDE 30 MG: 30 CAPSULE ORAL at 08:35

## 2024-05-14 RX ADMIN — KETOROLAC TROMETHAMINE 15 MG: 30 INJECTION, SOLUTION INTRAMUSCULAR; INTRAVENOUS at 22:15

## 2024-05-14 RX ADMIN — POLYETHYLENE GLYCOL 3350 17 G: 17 POWDER, FOR SOLUTION ORAL at 08:40

## 2024-05-14 RX ADMIN — ACETAMINOPHEN 650 MG: 325 TABLET ORAL at 22:15

## 2024-05-14 RX ADMIN — FENOFIBRATE 54 MG: 54 TABLET ORAL at 08:39

## 2024-05-14 RX ADMIN — ACETAMINOPHEN 650 MG: 325 TABLET ORAL at 10:22

## 2024-05-14 RX ADMIN — HYDROCORTISONE 20 MG: 20 TABLET ORAL at 16:31

## 2024-05-14 RX ADMIN — SODIUM CHLORIDE 100 ML/HR: 9 INJECTION, SOLUTION INTRAVENOUS at 02:05

## 2024-05-14 RX ADMIN — TIMOLOL MALEATE 1 DROP: 5 SOLUTION/ DROPS OPHTHALMIC at 08:40

## 2024-05-14 RX ADMIN — OXYCODONE HYDROCHLORIDE 10 MG: 5 TABLET ORAL at 08:54

## 2024-05-14 RX ADMIN — KETOROLAC TROMETHAMINE 15 MG: 30 INJECTION, SOLUTION INTRAMUSCULAR; INTRAVENOUS at 10:22

## 2024-05-14 RX ADMIN — Medication 2 L/MIN: at 04:21

## 2024-05-14 RX ADMIN — HYDROCORTISONE SODIUM SUCCINATE 25 MG: 100 INJECTION, POWDER, FOR SOLUTION INTRAMUSCULAR; INTRAVENOUS at 21:43

## 2024-05-14 RX ADMIN — GABAPENTIN 100 MG: 100 CAPSULE ORAL at 08:39

## 2024-05-14 RX ADMIN — VENLAFAXINE 75 MG: 75 TABLET ORAL at 08:35

## 2024-05-14 RX ADMIN — LORAZEPAM 1.5 MG: 0.5 TABLET ORAL at 20:33

## 2024-05-14 RX ADMIN — SENNOSIDES AND DOCUSATE SODIUM 2 TABLET: 8.6; 5 TABLET ORAL at 20:34

## 2024-05-14 RX ADMIN — ACETAMINOPHEN 650 MG: 325 TABLET ORAL at 16:31

## 2024-05-14 RX ADMIN — HEPARIN SODIUM 5000 UNITS: 5000 INJECTION INTRAVENOUS; SUBCUTANEOUS at 16:32

## 2024-05-14 RX ADMIN — ACETAMINOPHEN 650 MG: 325 TABLET ORAL at 04:31

## 2024-05-14 RX ADMIN — SIMVASTATIN 40 MG: 20 TABLET, FILM COATED ORAL at 20:33

## 2024-05-14 RX ADMIN — KETOROLAC TROMETHAMINE 15 MG: 30 INJECTION, SOLUTION INTRAMUSCULAR; INTRAVENOUS at 04:31

## 2024-05-14 RX ADMIN — PANTOPRAZOLE SODIUM 40 MG: 40 TABLET, DELAYED RELEASE ORAL at 07:06

## 2024-05-14 RX ADMIN — METHOCARBAMOL 500 MG: 500 TABLET ORAL at 20:34

## 2024-05-14 RX ADMIN — METHOCARBAMOL 500 MG: 500 TABLET ORAL at 08:39

## 2024-05-14 RX ADMIN — HEPARIN SODIUM 5000 UNITS: 5000 INJECTION INTRAVENOUS; SUBCUTANEOUS at 08:39

## 2024-05-14 RX ADMIN — SODIUM CHLORIDE 1000 ML: 9 INJECTION, SOLUTION INTRAVENOUS at 13:33

## 2024-05-14 RX ADMIN — SENNOSIDES AND DOCUSATE SODIUM 2 TABLET: 8.6; 5 TABLET ORAL at 08:39

## 2024-05-14 RX ADMIN — HYDROMORPHONE HYDROCHLORIDE 0.2 MG: 1 INJECTION, SOLUTION INTRAMUSCULAR; INTRAVENOUS; SUBCUTANEOUS at 11:30

## 2024-05-14 ASSESSMENT — PAIN - FUNCTIONAL ASSESSMENT
PAIN_FUNCTIONAL_ASSESSMENT: 0-10

## 2024-05-14 ASSESSMENT — PAIN SCALES - GENERAL
PAINLEVEL_OUTOF10: 8
PAINLEVEL_OUTOF10: 10 - WORST POSSIBLE PAIN
PAINLEVEL_OUTOF10: 3
PAINLEVEL_OUTOF10: 8
PAINLEVEL_OUTOF10: 8
PAINLEVEL_OUTOF10: 9
PAINLEVEL_OUTOF10: 4
PAINLEVEL_OUTOF10: 8
PAINLEVEL_OUTOF10: 7

## 2024-05-14 ASSESSMENT — COGNITIVE AND FUNCTIONAL STATUS - GENERAL
MOBILITY SCORE: 14
MOVING TO AND FROM BED TO CHAIR: A LOT
WALKING IN HOSPITAL ROOM: A LOT
STANDING UP FROM CHAIR USING ARMS: A LITTLE
TURNING FROM BACK TO SIDE WHILE IN FLAT BAD: A LITTLE
CLIMB 3 TO 5 STEPS WITH RAILING: TOTAL
MOVING FROM LYING ON BACK TO SITTING ON SIDE OF FLAT BED WITH BEDRAILS: A LITTLE

## 2024-05-14 ASSESSMENT — PAIN DESCRIPTION - LOCATION
LOCATION: BACK

## 2024-05-14 ASSESSMENT — ACTIVITIES OF DAILY LIVING (ADL): ADL_ASSISTANCE: INDEPENDENT

## 2024-05-14 NOTE — SIGNIFICANT EVENT
Rapid Response RN Note    Rapid response RN at bedside for RADAR score 7 due to the recent VS listed below:     Vitals:    05/13/24 1500 05/13/24 1600 05/13/24 2001 05/13/24 2008   BP: 92/61 95/66 76/59 92/60   BP Location: Left arm Right arm Right arm    Patient Position: Lying Lying Lying    Pulse: 83  95 94   Resp: 17  17    Temp: 36 °C (96.8 °F) 35.5 °C (95.9 °F) 36 °C (96.8 °F)    TempSrc: Temporal Temporal Temporal    SpO2: 94% 96% 93% 98%   Weight:       Height:            Reviewed above VS with bedside RN.  Vital signs rechecked with appropriate size cuff and back to baseline.  VS within patient's current trends.  No interventions by rapid response team indicated at this time.  Patient denied pain, shortness of breath, dizziness or lightheadedness.  Staff to page rapid response for any concerns or acute change in condition/VS.

## 2024-05-14 NOTE — SIGNIFICANT EVENT
Rapid Response RN Note    Rapid response RN at bedside for RADAR score 7 due to the following VS: T 36 °Celsius; HR 87 ; RR 18; BP 81/55; SPO2 91%.     Reviewed above VS with bedside RN. Patient denied pain, shortness of breath, dizziness or lightheadedness. Manual BP taken, 80/58. Notified primary neurosurgery team. 1 L NS bolus ordered and initiated. Peripheral IV placed in left arm. CBC ordered and sent. No interventions by rapid response team indicated at this time.      Staff to page rapid response for any concerns or acute change in condition/VS.

## 2024-05-14 NOTE — PROGRESS NOTES
"Michelle Jack is a 73 y.o. female on day 6 of admission presenting with Cervical myelopathy (Multi).    Subjective   Pain controlled overnight, no complaints. Rapid response for tmax 38, soft SBP, tachy. Improving with fluids, getting blood transfusion    Objective     Physical Exam  RUE D4 T4 B4 HG4 IO3  RLE HF4+ KE/DF/PF 5  LUE D4- T4- B/HG 4 IO3  LLE HF 4- KE/DF/PF 5  BLE spasticity  Incision c/d/I  Drains in place    Last Recorded Vitals  Blood pressure 102/66, pulse 103, temperature 37.6 °C (99.7 °F), temperature source Temporal, resp. rate 18, height 1.626 m (5' 4\"), weight 57.4 kg (126 lb 8 oz), SpO2 97%.  Intake/Output last 3 Shifts:  I/O last 3 completed shifts:  In: 2666.7 (46.5 mL/kg) [I.V.:2666.7 (46.5 mL/kg)]  Out: 1005 (17.5 mL/kg) [Urine:185 (0.1 mL/kg/hr); Drains:20; Blood:800]  Weight: 57.4 kg     Relevant Results    Assessment/Plan   Principal Problem:    Cervical myelopathy (Multi)  Active Problems:    Status post spinal surgery    Sagittal plane imbalance    Spondylolisthesis, lumbar region    Lumbar spine instability    Lumbar foraminal stenosis    Lumbar spinal stenosis due to adjacent segment disease after fusion procedure    Pseudarthrosis following spinal fusion    Michelle Jack is a 73 y.o. female with h/o HTN, HLD, GERD, RA, IBD, GI ulcer p/w back pain and BLE weakness (L >R) x 5mo, MRI C6-7 severe stenosis w/ kyphotic deformity.     5/13 s/p remocalof L3-5 hardware, L2-3 decommpression, L5-S1 TLIF, T11-pelvis fusion     Plan:    Floor  Continue drains  Wean O2  Wound care c/s  ASA restart POD5  Sidagam recs-elevated but acceptable risk, beta blocker through OR  PTOT  SCDs, SQH      Tommie Summers MD      "

## 2024-05-14 NOTE — CONSULTS
Wound Care Consult     Visit Date: 5/14/2024      Patient Name: Michelle Jack         MRN: [id number]           YOB: 1950     Reason for Consult: left leg skin tear        Wound Assessment:  Wound 05/13/24 Incision Back Lower;Medial;Mid (Active)   Site Assessment Clean;Dry 05/14/24 0205   Kristyn-Wound Assessment Dry;Clean 05/14/24 0205   Closure Glue;Sutures 05/14/24 0205   Sutures/Staple Line Approximated 05/14/24 0205   Drainage Description None 05/14/24 0835   Drainage Amount None 05/14/24 0835   Dressing Transparent film 05/14/24 0835   Dressing Changed New 05/13/24 1339   Dressing Status Clean;Dry;Occlusive 05/14/24 0835       Wound 05/13/24 Skin Tear Tibial Left;Lateral (Active)   Wound Image   05/14/24 1546   Site Assessment Fragile;Red 05/14/24 1546   Kristyn-Wound Assessment Blanchable erythema;Fragile 05/14/24 1546   Non-staged Wound Description Partial thickness 05/14/24 1546   Margins Not attached 05/14/24 1546   Drainage Description Serosanguineous 05/14/24 1546   Drainage Amount Scant 05/14/24 1546   Dressing Non adherent;Kerlix/rolled gauze 05/14/24 1546   Dressing Changed New 05/14/24 1546   Dressing Status Clean;Dry 05/13/24 2008       Wound Team Summary Assessment:   Wounds measuer 4cm x 4cm (distal) and 2cm x 2cm (proximal).  Cleanse wounds with NS, allow to dry.  Today, mepitel was placed over fragile tissue, followed by mepilex transfer. These dressings should stay in place until Thurs/Fri. Leg then wrapped with kerlix.   Wound care Rn to follow-up with patient on Thurs/Fri to change dressing.      Bianka Leyva RN  5/14/2024  3:48 PM

## 2024-05-14 NOTE — HOSPITAL COURSE
73 year old female with PMH of HTN, HLD, GERD, RA, IBD, GI ulcer, prior L3-4 fusion (2023), L4-5 lami and fusion (2020).   5/8 Presented to the ED with back pain and BLE weakness, parasthesia (L>R) x 5 months. MRI CS w/ C6-7 severe stenosis and kyphotic deformity. 5/9 BLE DVT US neg, TTE EF 65-70%. 5/13 s/p removal of L3-L5 hardware, L2-3 decompression, L5-S1 TLIF, L2-3 PCO, T11-pelvis instrumented fusion, use of BMP. 5/14 Hgb trend down to 8, 1 unit PRBC with improvement of Hgb to 10.2 s/p transfusion. Rapid response called for low BP, asymptomatic. Given IVF and hydrocortisone x3 doses. Restarted on home prednisone. Wound care consulted for skin tear to left shin, cleanse and mepilex twice weekly. 5/16 EOS with good alignment.  Aspirin restarted 5/18 POD5. Surgical drain removed 5/20 without complication. PT/OT recommends rehab at time of discharge. On the day of discharge, the patient was seen and evaluated by the neurosurgery team and deemed suitable for discharge to Rehab.  There were no significant events overnight. Vitals were reviewed and stable. Labs were stable at discharge. The patient was given detailed discharge instructions and were scheduled to follow up as an outpatient.

## 2024-05-14 NOTE — NURSING NOTE
While ambulating pt. To bathroom. L Hemovac drain dislodged. NGSY team notified. &Paged to bedside.

## 2024-05-14 NOTE — SIGNIFICANT EVENT
Rapid Response RN Note     05/14/24 0428   Onset Documentation   Rapid Response Initiated By Radar auto page   Location/Room [facility]  (LT 4052)   Pager Time 0422   Arrival Time 0428   Event End Time 0433   Primary Reason for Call Radar auto page  (score 6)     Rapid response RN at bedside for RADAR score 6 due to the following VS: T 38.0 °Celsius;  ; RR 18; /72; SPO2 91% on supplemental oxygen.     Reviewed above VS with bedside RN.  Patient receiving blood transfusion.  Per report, temperature unchanged from pre-transfusion vitals and vitals obtained after first 15 minutes (RADAR-associated VS).  Bedside RN administered scheduled Acetaminophen.  Patient alert and oriented.  Skin pink, warm and dry.  Primary Neurosurgery service updated.  No new orders at this time.    Staff to page rapid response for any concerns or acute change in condition/VS.

## 2024-05-14 NOTE — PROGRESS NOTES
Physical Therapy    Physical Therapy Evaluation & Treatment    Patient Name: Michelle Jack  MRN: [id number]  Today's Date: 5/14/2024   Time Calculation  Start Time: 1431  Stop Time: 1455  Time Calculation (min): 24 min    Assessment/Plan   PT Assessment  PT Assessment Results: Decreased strength, Decreased endurance, Impaired balance, Decreased mobility  Rehab Prognosis: Good  Medical Staff Made Aware: Yes  End of Session Communication: Bedside nurse  Assessment Comment: Patient is a 74yo F presenting 5/13 s/p remocalof L3-5 hardware, L2-3 decommpression, L5-S1 TLIF, T11-pelvis fusion. Patient was indep until ~ 5 months ago and has since required use of FWW and assist from family due to back pain and weakness. Patient is below baseline and a falls risk. dc rec high  End of Session Patient Position: Up in chair, Alarm off, not on at start of session (RN aware, sisters in room)   IP OR SWING BED PT PLAN  Inpatient or Swing Bed: Inpatient  PT Plan  Treatment/Interventions: Bed mobility, Transfer training, Gait training, Stair training, Balance training, Neuromuscular re-education, Strengthening, Endurance training, Range of motion, Therapeutic exercise, Therapeutic activity  PT Plan: Skilled PT  PT Frequency: Daily  PT Discharge Recommendations: High intensity level of continued care  PT Recommended Transfer Status: Assistive device  PT - OK to Discharge: Yes (indicates PT eval complete and dc rec determined)      Subjective     General Visit Information:  General  Reason for Referral: 5/13 s/p remocalof L3-5 hardware, L2-3 decommpression, L5-S1 TLIF, T11-pelvis fusion  Past Medical History Relevant to Rehab: HTN, HLD, GERD, RA, IBD, GI ulcer p/w back pain and BLE weakness (L >R) x 5mo, MRI C6-7 severe stenosis w/ kyphotic deformity.  Family/Caregiver Present: Yes  Caregiver Feedback: sisters present  Prior to Session Communication: Bedside nurse  Patient Position Received: Bed, 3 rail up  General Comment: pt supine in  bed, RN cleared. pt calling out to use restroom, RN reports she ambulated with FWW and assist twice this morning.  Home Living:  Home Living  Type of Home: House  Lives With:  (sisters)  Home Adaptive Equipment: Walker rolling or standard (rollator, transport chair, commode, shower chair, grab bars. accessible home)  Home Layout:  (multi level but elevator in the home)  Home Access: Elevator  Home Living Comments: pt lives in [place], was staying with a sister in [place] for last 5 months. another sister lives in [facility] and will dc home to her accessible home when time to return home  Prior Level of Function:  Prior Function Per Pt/Caregiver Report  Level of [place]: Independent with ADLs and functional transfers, Independent with homemaking with ambulation  Receives Help From: Family  ADL Assistance: Independent  Homemaking Assistance: Independent  Ambulatory Assistance:  (indep until ~ 5 months ago when back pain increased. since back pain, required FWW for amb)  Precautions:  Precautions  Medical Precautions: Fall precautions  Post-Surgical Precautions: Spinal precautions  Vital Signs:  Vital Signs  Heart Rate: 89  BP: 91/57    Objective   Pain:  Pain Assessment  Pain Assessment: 0-10  Pain Score: 8  Pain Type: Surgical pain  Pain Location: Back  Cognition:  Cognition  Overall Cognitive Status: Within Functional Limits  Orientation Level: Oriented X4    General Assessments:     Activity Tolerance  Endurance: Tolerates 10 - 20 min exercise with multiple rests    Sensation  Light Touch: No apparent deficits        Functional Assessments:  Bed Mobility  Bed Mobility: Yes  Bed Mobility 1  Bed Mobility 1: Supine to sitting, Log roll  Level of Assistance 1: Minimum assistance, Minimal verbal cues  Bed Mobility Comments 1: cues for log roll and sequencing.    Transfers  Transfer: Yes  Transfer 1  Transfer From 1: Sit to, Stand to  Transfer to 1: Stand, Sit  Technique 1: Sit to stand, Stand to sit  Transfer Device 1:  Walker  Transfer Level of Assistance 1: Minimum assistance, Minimal verbal cues  Trials/Comments 1: cues for hand placement on FWW. stood from EOB and from toilet    Ambulation/Gait Training  Ambulation/Gait Training Performed: Yes  Ambulation/Gait Training 1  Surface 1: Level tile  Device 1: Rolling walker  Assistance 1: Minimum assistance (moments of mod A)  Quality of Gait 1: Shuffling gait, Decreased step length  Comments/Distance (ft) 1: pt ambulated 12' x2 with FWW. unsteady and posterior lean lifting FWW off ground. pt required min A with moments of mod A for balance.  Extremity/Trunk Assessments:  RLE   RLE :  (observed > 3/5 with mobility)  LLE   LLE :  (observed > 3/5 with mobility)  Treatments:  Therapeutic Activity  Therapeutic Activity Performed: Yes  Therapeutic Activity 1: pt edu on spinal precautions and log roll. Patient cued to complete. pt then stood from EOB and amb to bathroom with FWW. posterior LOBs with mod A to correct. Pt used bathroom and required CGA for sitting on toilet. ambulated back to sit in chair.  Outcome Measures:  [facility] Basic Mobility  Turning from your back to your side while in a flat bed without using bedrails: A little  Moving from lying on your back to sitting on the side of a flat bed without using bedrails: A little  Moving to and from bed to chair (including a wheelchair): A lot  Standing up from a chair using your arms (e.g. wheelchair or bedside chair): A little  To walk in hospital room: A lot  Climbing 3-5 steps with railing: Total  Basic Mobility - Total Score: 14    Encounter Problems       Encounter Problems (Active)       Balance       STG - Maintains dynamic standing balance with upper extremity support without LOB using device with minimal cues.  (Progressing)       Start:  05/14/24    Expected End:  05/28/24       INTERVENTIONS:  1. Practice standing with minimal support.  2. Educate patient about standing tolerance.  3. Educate patient about independence with  gait, transfers, and ADL's.  4. Educate patient about use of assistive device.  5. Educate patient about self-directed care.            Mobility       STG - Patient will ambulate > 200' LRAD modif indep  (Progressing)       Start:  05/14/24    Expected End:  05/28/24               PT Transfers       STG - Patient to transfer to and from sit to supine with log roll modif indep  (Progressing)       Start:  05/14/24    Expected End:  05/28/24            STG - Patient will roll indep  (Progressing)       Start:  05/14/24    Expected End:  05/28/24            STG - Patient will transfer sit to and from stand modif indep LRAD (Progressing)       Start:  05/14/24    Expected End:  05/28/24                   Education Documentation  Precautions, taught by Anabel Velásquez PT at 5/14/2024  3:29 PM.  Learner: Family, Patient  Readiness: Acceptance  Method: Explanation  Response: Verbalizes Understanding  Comment: edu on role of PT, dc plan, spinal precautions    Mobility Training, taught by Anabel Velásquez PT at 5/14/2024  3:29 PM.  Learner: Family, Patient  Readiness: Acceptance  Method: Explanation  Response: Verbalizes Understanding  Comment: edu on role of PT, dc plan, spinal precautions    Education Comments  No comments found.

## 2024-05-15 LAB
ALBUMIN SERPL BCP-MCNC: 2.7 G/DL (ref 3.4–5)
ALBUMIN SERPL BCP-MCNC: 2.8 G/DL (ref 3.4–5)
ANION GAP SERPL CALC-SCNC: 12 MMOL/L (ref 10–20)
ANION GAP SERPL CALC-SCNC: 14 MMOL/L (ref 10–20)
BLOOD EXPIRATION DATE: NORMAL
BLOOD EXPIRATION DATE: NORMAL
BUN SERPL-MCNC: 8 MG/DL (ref 6–23)
BUN SERPL-MCNC: 8 MG/DL (ref 6–23)
CALCIUM SERPL-MCNC: 8.1 MG/DL (ref 8.6–10.6)
CALCIUM SERPL-MCNC: 8.3 MG/DL (ref 8.6–10.6)
CHLORIDE SERPL-SCNC: 107 MMOL/L (ref 98–107)
CHLORIDE SERPL-SCNC: 107 MMOL/L (ref 98–107)
CO2 SERPL-SCNC: 23 MMOL/L (ref 21–32)
CO2 SERPL-SCNC: 25 MMOL/L (ref 21–32)
CREAT SERPL-MCNC: 0.36 MG/DL (ref 0.5–1.05)
CREAT SERPL-MCNC: 0.39 MG/DL (ref 0.5–1.05)
DISPENSE STATUS: NORMAL
DISPENSE STATUS: NORMAL
EGFRCR SERPLBLD CKD-EPI 2021: >90 ML/MIN/1.73M*2
EGFRCR SERPLBLD CKD-EPI 2021: >90 ML/MIN/1.73M*2
ERYTHROCYTE [DISTWIDTH] IN BLOOD BY AUTOMATED COUNT: 16.1 % (ref 11.5–14.5)
GLUCOSE SERPL-MCNC: 119 MG/DL (ref 74–99)
GLUCOSE SERPL-MCNC: 125 MG/DL (ref 74–99)
HCT VFR BLD AUTO: 29.1 % (ref 36–46)
HGB BLD-MCNC: 9.1 G/DL (ref 12–16)
MAGNESIUM SERPL-MCNC: 1.64 MG/DL (ref 1.6–2.4)
MCH RBC QN AUTO: 29.5 PG (ref 26–34)
MCHC RBC AUTO-ENTMCNC: 31.3 G/DL (ref 32–36)
MCV RBC AUTO: 95 FL (ref 80–100)
NRBC BLD-RTO: 0 /100 WBCS (ref 0–0)
PHOSPHATE SERPL-MCNC: 1.2 MG/DL (ref 2.5–4.9)
PHOSPHATE SERPL-MCNC: 1.3 MG/DL (ref 2.5–4.9)
PLATELET # BLD AUTO: 204 X10*3/UL (ref 150–450)
POTASSIUM SERPL-SCNC: 3 MMOL/L (ref 3.5–5.3)
POTASSIUM SERPL-SCNC: 3.1 MMOL/L (ref 3.5–5.3)
PRODUCT BLOOD TYPE: 600
PRODUCT BLOOD TYPE: 600
PRODUCT CODE: NORMAL
PRODUCT CODE: NORMAL
RBC # BLD AUTO: 3.08 X10*6/UL (ref 4–5.2)
SODIUM SERPL-SCNC: 141 MMOL/L (ref 136–145)
SODIUM SERPL-SCNC: 141 MMOL/L (ref 136–145)
UNIT ABO: NORMAL
UNIT ABO: NORMAL
UNIT NUMBER: NORMAL
UNIT NUMBER: NORMAL
UNIT RH: NORMAL
UNIT RH: NORMAL
UNIT VOLUME: 264
UNIT VOLUME: 288
WBC # BLD AUTO: 11.1 X10*3/UL (ref 4.4–11.3)
XM INTEP: NORMAL
XM INTEP: NORMAL

## 2024-05-15 PROCEDURE — 2500000004 HC RX 250 GENERAL PHARMACY W/ HCPCS (ALT 636 FOR OP/ED): Performed by: STUDENT IN AN ORGANIZED HEALTH CARE EDUCATION/TRAINING PROGRAM

## 2024-05-15 PROCEDURE — 80069 RENAL FUNCTION PANEL: CPT | Mod: 91 | Performed by: STUDENT IN AN ORGANIZED HEALTH CARE EDUCATION/TRAINING PROGRAM

## 2024-05-15 PROCEDURE — 2500000001 HC RX 250 WO HCPCS SELF ADMINISTERED DRUGS (ALT 637 FOR MEDICARE OP): Performed by: ANESTHESIOLOGY

## 2024-05-15 PROCEDURE — 97530 THERAPEUTIC ACTIVITIES: CPT | Mod: GP,CQ

## 2024-05-15 PROCEDURE — 36415 COLL VENOUS BLD VENIPUNCTURE: CPT | Performed by: STUDENT IN AN ORGANIZED HEALTH CARE EDUCATION/TRAINING PROGRAM

## 2024-05-15 PROCEDURE — 84100 ASSAY OF PHOSPHORUS: CPT | Performed by: STUDENT IN AN ORGANIZED HEALTH CARE EDUCATION/TRAINING PROGRAM

## 2024-05-15 PROCEDURE — 36415 COLL VENOUS BLD VENIPUNCTURE: CPT

## 2024-05-15 PROCEDURE — 2500000001 HC RX 250 WO HCPCS SELF ADMINISTERED DRUGS (ALT 637 FOR MEDICARE OP): Performed by: STUDENT IN AN ORGANIZED HEALTH CARE EDUCATION/TRAINING PROGRAM

## 2024-05-15 PROCEDURE — 2500000004 HC RX 250 GENERAL PHARMACY W/ HCPCS (ALT 636 FOR OP/ED)

## 2024-05-15 PROCEDURE — 83735 ASSAY OF MAGNESIUM: CPT

## 2024-05-15 PROCEDURE — 85027 COMPLETE CBC AUTOMATED: CPT | Performed by: STUDENT IN AN ORGANIZED HEALTH CARE EDUCATION/TRAINING PROGRAM

## 2024-05-15 PROCEDURE — 97165 OT EVAL LOW COMPLEX 30 MIN: CPT | Mod: GO

## 2024-05-15 PROCEDURE — 97535 SELF CARE MNGMENT TRAINING: CPT | Mod: GO

## 2024-05-15 PROCEDURE — 97116 GAIT TRAINING THERAPY: CPT | Mod: GP,CQ

## 2024-05-15 PROCEDURE — 2500000006 HC RX 250 W HCPCS SELF ADMINISTERED DRUGS (ALT 637 FOR ALL PAYERS): Performed by: STUDENT IN AN ORGANIZED HEALTH CARE EDUCATION/TRAINING PROGRAM

## 2024-05-15 PROCEDURE — 1200000002 HC GENERAL ROOM WITH TELEMETRY DAILY

## 2024-05-15 RX ORDER — ALUMINUM HYDROXIDE, MAGNESIUM HYDROXIDE, AND SIMETHICONE 1200; 120; 1200 MG/30ML; MG/30ML; MG/30ML
30 SUSPENSION ORAL 3 TIMES DAILY
Status: COMPLETED | OUTPATIENT
Start: 2024-05-15 | End: 2024-05-17

## 2024-05-15 RX ORDER — CALCIUM CARBONATE 200(500)MG
500 TABLET,CHEWABLE ORAL 4 TIMES DAILY PRN
Status: DISCONTINUED | OUTPATIENT
Start: 2024-05-15 | End: 2024-05-21 | Stop reason: HOSPADM

## 2024-05-15 RX ADMIN — BUDESONIDE 9 MG: 3 CAPSULE, COATED PELLETS ORAL at 10:25

## 2024-05-15 RX ADMIN — HEPARIN SODIUM 5000 UNITS: 5000 INJECTION INTRAVENOUS; SUBCUTANEOUS at 10:24

## 2024-05-15 RX ADMIN — PANTOPRAZOLE SODIUM 40 MG: 40 TABLET, DELAYED RELEASE ORAL at 07:00

## 2024-05-15 RX ADMIN — SENNOSIDES AND DOCUSATE SODIUM 2 TABLET: 8.6; 5 TABLET ORAL at 10:25

## 2024-05-15 RX ADMIN — SENNOSIDES AND DOCUSATE SODIUM 2 TABLET: 8.6; 5 TABLET ORAL at 20:36

## 2024-05-15 RX ADMIN — ACETAMINOPHEN 650 MG: 325 TABLET ORAL at 16:48

## 2024-05-15 RX ADMIN — OXYCODONE HYDROCHLORIDE 5 MG: 5 TABLET ORAL at 18:22

## 2024-05-15 RX ADMIN — KETOROLAC TROMETHAMINE 15 MG: 30 INJECTION, SOLUTION INTRAMUSCULAR; INTRAVENOUS at 10:25

## 2024-05-15 RX ADMIN — HEPARIN SODIUM 5000 UNITS: 5000 INJECTION INTRAVENOUS; SUBCUTANEOUS at 00:21

## 2024-05-15 RX ADMIN — CEVIMELINE HYDROCHLORIDE 30 MG: 30 CAPSULE ORAL at 10:25

## 2024-05-15 RX ADMIN — GABAPENTIN 100 MG: 100 CAPSULE ORAL at 20:36

## 2024-05-15 RX ADMIN — POLYETHYLENE GLYCOL 3350 17 G: 17 POWDER, FOR SOLUTION ORAL at 10:24

## 2024-05-15 RX ADMIN — MISOPROSTOL 200 MCG: 200 TABLET ORAL at 10:25

## 2024-05-15 RX ADMIN — TIMOLOL MALEATE 1 DROP: 5 SOLUTION/ DROPS OPHTHALMIC at 10:48

## 2024-05-15 RX ADMIN — VENLAFAXINE 75 MG: 75 TABLET ORAL at 10:25

## 2024-05-15 RX ADMIN — PREDNISONE 3 MG: 1 TABLET ORAL at 10:24

## 2024-05-15 RX ADMIN — LORAZEPAM 1.5 MG: 0.5 TABLET ORAL at 20:35

## 2024-05-15 RX ADMIN — SIMVASTATIN 40 MG: 20 TABLET, FILM COATED ORAL at 20:36

## 2024-05-15 RX ADMIN — ACETAMINOPHEN 650 MG: 325 TABLET ORAL at 04:15

## 2024-05-15 RX ADMIN — OXYCODONE HYDROCHLORIDE 5 MG: 5 TABLET ORAL at 22:14

## 2024-05-15 RX ADMIN — METHOCARBAMOL 500 MG: 500 TABLET ORAL at 20:36

## 2024-05-15 RX ADMIN — OXYCODONE HYDROCHLORIDE 5 MG: 5 TABLET ORAL at 13:36

## 2024-05-15 RX ADMIN — ALUMINUM HYDROXIDE, MAGNESIUM HYDROXIDE, AND SIMETHICONE 30 ML: 1200; 120; 1200 SUSPENSION ORAL at 16:50

## 2024-05-15 RX ADMIN — ACETAMINOPHEN 650 MG: 325 TABLET ORAL at 22:14

## 2024-05-15 RX ADMIN — GABAPENTIN 100 MG: 100 CAPSULE ORAL at 10:24

## 2024-05-15 RX ADMIN — HEPARIN SODIUM 5000 UNITS: 5000 INJECTION INTRAVENOUS; SUBCUTANEOUS at 16:48

## 2024-05-15 RX ADMIN — KETOROLAC TROMETHAMINE 15 MG: 30 INJECTION, SOLUTION INTRAMUSCULAR; INTRAVENOUS at 04:15

## 2024-05-15 RX ADMIN — ALUMINUM HYDROXIDE, MAGNESIUM HYDROXIDE, AND SIMETHICONE 30 ML: 1200; 120; 1200 SUSPENSION ORAL at 20:38

## 2024-05-15 RX ADMIN — FENOFIBRATE 54 MG: 54 TABLET ORAL at 10:25

## 2024-05-15 RX ADMIN — OXYCODONE HYDROCHLORIDE 5 MG: 5 TABLET ORAL at 00:21

## 2024-05-15 RX ADMIN — ACETAMINOPHEN 650 MG: 325 TABLET ORAL at 10:25

## 2024-05-15 RX ADMIN — HYDROCORTISONE SODIUM SUCCINATE 25 MG: 100 INJECTION, POWDER, FOR SOLUTION INTRAMUSCULAR; INTRAVENOUS at 00:21

## 2024-05-15 RX ADMIN — METHOCARBAMOL 500 MG: 500 TABLET ORAL at 10:25

## 2024-05-15 ASSESSMENT — COGNITIVE AND FUNCTIONAL STATUS - GENERAL
MOVING TO AND FROM BED TO CHAIR: A LOT
HELP NEEDED FOR BATHING: A LITTLE
CLIMB 3 TO 5 STEPS WITH RAILING: A LOT
DAILY ACTIVITIY SCORE: 16
PERSONAL GROOMING: A LITTLE
DRESSING REGULAR UPPER BODY CLOTHING: A LITTLE
DRESSING REGULAR LOWER BODY CLOTHING: TOTAL
HELP NEEDED FOR BATHING: A LOT
PERSONAL GROOMING: A LITTLE
MOBILITY SCORE: 16
STANDING UP FROM CHAIR USING ARMS: A LITTLE
STANDING UP FROM CHAIR USING ARMS: A LOT
DAILY ACTIVITIY SCORE: 18
TURNING FROM BACK TO SIDE WHILE IN FLAT BAD: A LOT
DRESSING REGULAR LOWER BODY CLOTHING: A LOT
MOVING TO AND FROM BED TO CHAIR: A LITTLE
TOILETING: A LITTLE
CLIMB 3 TO 5 STEPS WITH RAILING: A LOT
MOBILITY SCORE: 12
MOVING FROM LYING ON BACK TO SITTING ON SIDE OF FLAT BED WITH BEDRAILS: A LOT
TURNING FROM BACK TO SIDE WHILE IN FLAT BAD: A LOT
WALKING IN HOSPITAL ROOM: A LOT
TOILETING: A LITTLE
DRESSING REGULAR UPPER BODY CLOTHING: A LITTLE
MOVING FROM LYING ON BACK TO SITTING ON SIDE OF FLAT BED WITH BEDRAILS: A LITTLE
WALKING IN HOSPITAL ROOM: A LITTLE

## 2024-05-15 ASSESSMENT — PAIN - FUNCTIONAL ASSESSMENT
PAIN_FUNCTIONAL_ASSESSMENT: 0-10

## 2024-05-15 ASSESSMENT — PAIN DESCRIPTION - LOCATION
LOCATION: BACK
LOCATION: BACK

## 2024-05-15 ASSESSMENT — PAIN SCALES - GENERAL
PAINLEVEL_OUTOF10: 4
PAINLEVEL_OUTOF10: 8
PAINLEVEL_OUTOF10: 7
PAINLEVEL_OUTOF10: 5 - MODERATE PAIN
PAINLEVEL_OUTOF10: 7
PAINLEVEL_OUTOF10: 8
PAINLEVEL_OUTOF10: 4

## 2024-05-15 ASSESSMENT — ACTIVITIES OF DAILY LIVING (ADL)
BATHING_ASSISTANCE: MODERATE
HOME_MANAGEMENT_TIME_ENTRY: 8
ADL_ASSISTANCE: INDEPENDENT

## 2024-05-15 NOTE — PROGRESS NOTES
Physical Therapy    Physical Therapy Treatment    Patient Name: Michelle Jack  MRN: [id number]  Today's Date: 5/15/2024  Time Calculation  Start Time: 1459  Stop Time: 1524  Time Calculation (min): 25 min    Assessment/Plan   PT Assessment  End of Session Communication: Bedside nurse  Assessment Comment: .  End of Session Patient Position: Bed, 3 rail up, Alarm off, not on at start of session     PT Plan  Treatment/Interventions: Bed mobility, Transfer training, Gait training, Stair training, Balance training, Neuromuscular re-education, Strengthening, Endurance training, Range of motion, Therapeutic exercise, Therapeutic activity  PT Plan: Skilled PT  PT Frequency: Daily  PT Discharge Recommendations: High intensity level of continued care  PT Recommended Transfer Status: Assistive device  PT - OK to Discharge: Yes (indicates PT eval complete and dc rec determined)      General Visit Information:   PT  Visit  PT Received On: 05/15/24  General  Prior to Session Communication: Bedside nurse  Patient Position Received: Bed, 3 rail up  General Comment: Pt lying in bed on arrival. Reports fatigue but agreeable to therapy and participates fully. pt maintains spinal precautions with minimal cues throughout session. pt demonstrates increased ambulation tolerance tolerating x2 40' trials. pt however fatigues requiring 3 minutes sitting rest break between trials    Subjective   Precautions:  Precautions  Medical Precautions: Fall precautions  Post-Surgical Precautions: Spinal precautions  Vital Signs:       Objective   Pain:  Pain Assessment  Pain Score: 4  Pain Interventions: Repositioned  Cognition:  Cognition  Orientation Level: Oriented X4    Treatments:       Bed Mobility 1  Bed Mobility 1: Supine to sitting, Sitting to supine  Level of Assistance 1: Close supervision  Bed Mobility Comments 1: HOB maximally elevated    Ambulation/Gait Training 1  Surface 1: Level tile  Device 1: Rolling walker  Assistance 1: Contact  guard  Quality of Gait 1: Shuffling gait, Decreased step length  Comments/Distance (ft) 1: 2x40, cues for increased B step length and to improve posture  Transfer 1  Technique 1: Sit to stand, Stand to sit  Transfer Device 1: Walker  Transfer Level of Assistance 1: Contact guard  Trials/Comments 1: cues for hand placement and timing of weight shifts         Outcome Measures:  [facility] Basic Mobility  Turning from your back to your side while in a flat bed without using bedrails: A little  Moving from lying on your back to sitting on the side of a flat bed without using bedrails: A lot  Moving to and from bed to chair (including a wheelchair): A little  Standing up from a chair using your arms (e.g. wheelchair or bedside chair): A little  To walk in hospital room: A little  Climbing 3-5 steps with railing: A lot  Basic Mobility - Total Score: 16    Education Documentation  Mobility Training, taught by Talat Johnson PTA at 5/15/2024  6:00 PM.  Learner: Patient  Readiness: Acceptance  Method: Explanation  Response: Verbalizes Understanding    Education Comments  No comments found.        OP EDUCATION:       Encounter Problems       Encounter Problems (Active)       Balance       STG - Maintains dynamic standing balance with upper extremity support without LOB using device with minimal cues.  (Progressing)       Start:  05/14/24    Expected End:  05/28/24       INTERVENTIONS:  1. Practice standing with minimal support.  2. Educate patient about standing tolerance.  3. Educate patient about independence with gait, transfers, and ADL's.  4. Educate patient about use of assistive device.  5. Educate patient about self-directed care.            Mobility       STG - Patient will ambulate > 200' LRAD modif indep  (Progressing)       Start:  05/14/24    Expected End:  05/28/24               PT Transfers       STG - Patient to transfer to and from sit to supine with log roll modif indep  (Progressing)       Start:  05/14/24     Expected End:  05/28/24            STG - Patient will roll indep  (Progressing)       Start:  05/14/24    Expected End:  05/28/24            STG - Patient will transfer sit to and from stand modif indep LRAD (Progressing)       Start:  05/14/24    Expected End:  05/28/24

## 2024-05-15 NOTE — PROGRESS NOTES
"Michelle Jack is a 73 y.o. female on day 7 of admission presenting with Cervical myelopathy (Multi).    Subjective   No acute events    Objective     Physical Exam  RUE D4 T4 B4 HG4 IO3  RLE HF4+ KE/DF/PF 5  LUE D4- T4- B/HG 4 IO3  LLE HF 4- KE/DF/PF 5  BLE spasticity  Incision c/d/I    Last Recorded Vitals  Blood pressure 124/78, pulse 91, temperature 36.7 °C (98.1 °F), temperature source Temporal, resp. rate 18, height 1.626 m (5' 4\"), weight 57.4 kg (126 lb 8 oz), SpO2 91%.  Intake/Output last 3 Shifts:  I/O last 3 completed shifts:  In: 2529.6 (44.1 mL/kg) [I.V.:2239.2 (39 mL/kg); Blood:290.4]  Out: 2195 (38.3 mL/kg) [Urine:1010 (0.5 mL/kg/hr); Drains:385; Blood:800]  Weight: 57.4 kg     Relevant Results    Assessment/Plan   Principal Problem:    Cervical myelopathy (Multi)  Active Problems:    Status post spinal surgery    Sagittal plane imbalance    Spondylolisthesis, lumbar region    Lumbar spine instability    Lumbar foraminal stenosis    Lumbar spinal stenosis due to adjacent segment disease after fusion procedure    Pseudarthrosis following spinal fusion    Michelle Jack is a 73 y.o. female with h/o HTN, HLD, GERD, RA, IBD, GI ulcer p/w back pain and BLE weakness (L >R) x 5mo, MRI C6-7 severe stenosis w/ kyphotic deformity.     5/13 s/p remocalof L3-5 hardware, L2-3 decommpression, L5-S1 TLIF, T11-pelvis fusion     5/14 drain auto dc'd    Plan:    Floor  Continue drain  Wound care recs  ASA restart POD5  Sidagam recs  PTOT-rehab  SCDs, SQH      Tommie Summers MD      "

## 2024-05-15 NOTE — PROGRESS NOTES
Occupational Therapy    Evaluation and Treatment    Patient Name: Michelle Jack  MRN: [id number]  Today's Date: 5/15/2024  Room: [address] [place], [place] [address]  Time Calculation  Start Time: 1226  Stop Time: 1249  Time Calculation (min): 23 min    Assessment  IP OT Assessment  OT Assessment: Pt presents with decreased independence with ADLs, IADLs, functional transfers and mobiltiy and would benefit from skilled OT intervention to address deficits and facilitate return to PLOF.  Prognosis: Good  Barriers to Discharge: None  Evaluation/Treatment Tolerance: Patient tolerated treatment well  End of Session Communication: Bedside nurse  End of Session Patient Position: Bed, 3 rail up, Alarm off, not on at start of session (sisters present at bedside)  Plan:  Treatment Interventions: ADL retraining, Functional transfer training, Endurance training, Equipment evaluation/education, Compensatory technique education  OT Frequency: 3 times per week  OT Discharge Recommendations: High intensity level of continued care  Equipment Recommended upon Discharge:  (TBD)  OT Recommended Transfer Status: Assist of 1  OT - OK to Discharge: Yes    Subjective   Current Problem:  1. Cervical myelopathy (Multi)  Vascular US lower extremity venous duplex bilateral    Transthoracic Echo (TTE) Complete    Vascular US lower extremity venous duplex bilateral    Transthoracic Echo (TTE) Complete      2. Heart disease  Vascular US lower extremity venous duplex bilateral    Transthoracic Echo (TTE) Complete      3. Localized edema  Vascular US lower extremity venous duplex bilateral    Vascular US lower extremity venous duplex bilateral    Vascular US lower extremity venous duplex bilateral      4. Tachycardia  Vascular US lower extremity venous duplex bilateral    Transthoracic Echo (TTE) Complete    Vascular US lower extremity venous duplex bilateral    Transthoracic Echo (TTE) Complete      5. Other postprocedural complications and disorders of the circulatory  system, not elsewhere classified  Transthoracic Echo (TTE) Complete    Transthoracic Echo (TTE) Complete      6. Status post spinal surgery        7. Sagittal plane imbalance        8. Spondylolisthesis, lumbar region        9. Lumbar spine instability        10. Lumbar foraminal stenosis        11. Lumbar spinal stenosis due to adjacent segment disease after fusion procedure        12. Pseudarthrosis following spinal fusion          General:  Reason for Referral: 5/13 s/p remocalof L3-5 hardware, L2-3 decommpression, L5-S1 TLIF, T11-pelvis fusion  Past Medical History Relevant to Rehab: HTN, HLD, GERD, RA, IBD, GI ulcer p/w back pain and BLE weakness (L >R) x 5mo, MRI C6-7 severe stenosis w/ kyphotic deformity.  Prior to Session Communication: Bedside nurse  Patient Position Received: Bed, 3 rail up  Family/Caregiver Present: Yes  Caregiver Feedback: sisters present  General Comment: Pt lying in bed on arrival. Reports fatigue but agreeable to therapy and participates fully.   Precautions:  Medical Precautions: Fall precautions  Post-Surgical Precautions: Spinal precautions  Vital Signs:     Pain:  Pain Assessment  Pain Assessment: 0-10  Pain Score: 7  Pain Type: Surgical pain  Pain Location: Back  Lines/Tubes/Drains:  Closed/Suction Drain 2 Right;Midline Back Accordion 10 Fr. (Active)   Number of days: 2     Objective   Cognition:  Overall Cognitive Status: Within Functional Limits  Orientation Level: Oriented X4     Home Living:  Type of Home: [facility] (Pt lives alone in [place] but will be returning to [facility] home in [place])  Lives With:  (sister)  Home Adaptive Equipment: Walker rolling or standard (rollator, transport chair, commode, shower chair, grab bars. accessible home)  Home Layout:  (multi level but elevator in the home)  Home Access: Elevator  Bathroom Shower/Tub: Walk-in shower  Bathroom Equipment: Shower chair with back   Prior Function:  Level of Gila: Independent with ADLs and functional  transfers, Independent with homemaking with ambulation  Receives Help From: Family (sisters)  ADL Assistance: Independent  Homemaking Assistance: Independent  Ambulatory Assistance:  (indep until ~ 5 months ago when back pain increased. since back pain, Mod I with FWW)  Hand Dominance: Right  Prior Function Comments: +drives     ADL:  Eating Assistance: Independent  Grooming Assistance:  (CGA)  Grooming Deficit: Supervision/safety, Standing with assistive device, Wash/dry hands  Bathing Assistance: Moderate (anticipated)  UE Dressing Assistance: Stand by (anticipated)  LE Dressing Assistance: Maximal  LE Dressing Deficit: Don/doff R sock, Don/doff L sock  Toileting Assistance with Device: Minimal  Toileting Deficit: Steadying (assist to manage gown pre toileting)  Activity Tolerance:  Endurance: Tolerates 10 - 20 min exercise with multiple rests  Balance:  Dynamic Sitting Balance  Dynamic Sitting-Balance Support: No upper extremity supported, Feet supported  Dynamic Sitting-Comments: close supervision  Dynamic Standing Balance  Dynamic Standing-Balance Support: Bilateral upper extremity supported, No upper extremity supported  Dynamic Standing-Comments: CGA-Min A  Static Sitting Balance  Static Sitting-Balance Support: Feet supported, No upper extremity supported  Static Sitting-Level of Assistance: Distant supervision  Static Standing Balance  Static Standing-Balance Support: Bilateral upper extremity supported  Static Standing-Level of Assistance: Close supervision  Bed Mobility/Transfers: Bed Mobility/Transfers: Bed Mobility  Bed Mobility: Yes  Bed Mobility 1  Bed Mobility 1: Supine to sitting  Level of Assistance 1: Minimum assistance, Minimal verbal cues  Bed Mobility Comments 1: HOB elevated, cued for log roll  Bed Mobility 2  Bed Mobility  2: Sitting to supine  Level of Assistance 2: Moderate assistance  Bed Mobility Comments 2: cues + assist for log roll  Functional Mobility  Functional Mobility Performed:  Yes  Functional Mobility 1  Surface 1: Level tile  Device 1: Rolling walker  Assistance 1:  (Inital CGA, regressed to Min A as Pt fatigued)  Comments 1: EOB<>bathroom   and Transfers  Transfer: Yes  Transfer 1  Transfer From 1: Sit to, Stand to  Transfer to 1: Sit, Stand  Technique 1: Sit to stand, Stand to sit  Transfer Device 1: Walker  Transfer Level of Assistance 1: Contact guard  Trials/Comments 1: cues for stubbs dplacement     Vision: Vision - Basic Assessment  Current Vision: Wears glasses all the time   and    Sensation:  Light Touch: No apparent deficits  Strength:  Strength Comments: did not formally assess d/t contraindications, grossly 3+/5 based on functional observation.  Perception:  Inattention/Neglect: Appears intact    Hand Function:  Hand Function  Gross Grasp: Functional  Coordination: Functional  Extremities:   RUE   RUE : Within Functional Limits, LUE   LUE: Within Functional Limits, RLE   RLE : Within Functional Limits, and LLE   LLE : Within Functional Limits    Outcome Measures: [facility] Daily Activity  Putting on and taking off regular lower body clothing: Total  Bathing (including washing, rinsing, drying): A lot  Putting on and taking off regular upper body clothing: A little  Toileting, which includes using toilet, bedpan or urinal: A little  Taking care of personal grooming such as brushing teeth: A little  Eating Meals: None  Daily Activity - Total Score: 16  Brief Confusion Assessment Method (bCAM)  Feature 1: Altered Mental Status or Fluctuating Course: No  CAM Result: CAM -      ,          Education Documentation  Body Mechanics, taught by Betty Matias OT at 5/15/2024  2:02 PM.  Learner: Patient  Readiness: Acceptance  Method: Explanation, Demonstration  Response: Needs Reinforcement    Precautions, taught by Betty Matias OT at 5/15/2024  2:02 PM.  Learner: Patient  Readiness: Acceptance  Method: Explanation, Demonstration  Response: Needs Reinforcement    ADL Training, taught by  Betty Matias, OT at 5/15/2024  2:02 PM.  Learner: Patient  Readiness: Acceptance  Method: Explanation, Demonstration  Response: Needs Reinforcement    Education Comments  No comments found.      Goals:   Encounter Problems       Encounter Problems (Active)       ADLs       Patient will perform LB bathing with stand by assist level of assistance and long-handled sponge. (Progressing)       Start:  05/15/24    Expected End:  05/29/24            Patient with complete upper body dressing with independent level of assistance donning and doffing all UE clothes. (Progressing)       Start:  05/15/24    Expected End:  05/29/24            Patient with complete lower body dressing with stand by assist level of assistance donning and doffing all LE clothes  with PRN adaptive equipment. (Progressing)       Start:  05/15/24    Expected End:  05/29/24            Patient will complete daily grooming tasks with independent level of assistance and PRN adaptive equipment while standing. (Progressing)       Start:  05/15/24    Expected End:  05/29/24            Patient will complete toileting including hygiene clothing management/hygiene with stand by assist level of assistance and grab bars. (Progressing)       Start:  05/15/24    Expected End:  05/29/24               COGNITION/SAFETY       Patient will recall and adhere to spinal precuations with all ADL and functional mobility in order to promote healing and safety with functional tasks (Progressing)       Start:  05/15/24    Expected End:  05/29/24               MOBILITY       Patient will perform Functional mobility mod  Household distances/Community Distances with stand by assist level of assistance and least restrictive device in order to improve safety and functional mobility. (Progressing)       Start:  05/15/24    Expected End:  05/29/24               TRANSFERS       Patient will perform bed mobility stand by assist level of assistance and bed rails in order to improve  safety and independence with mobility (Progressing)       Start:  05/15/24    Expected End:  05/29/24            Patient will complete all functional transfers with least restrictive device with stand by assist level of assistance. (Progressing)       Start:  05/15/24    Expected End:  05/29/24                   Treatment Completed on Evaluation     Activities of Daily Living:    Grooming  Grooming Level of Assistance: Contact guard  Grooming Where Assessed: Standing sinkside  Grooming Comments: pt performed hand wahsing and oral hygiene, CGA for safety to perfom taks in standing. Cued to avoid bending down to sink to adhere to spine precuations.     Toileting  Toileting Level of Assistance: Minimum assistance  Where Assessed: Toilet  Toileting Comments: assist to manage gown, cued to avoid twisting       05/15/24 at 2:03 PM   Betty Matias, OT   Rehab Office: [phone]

## 2024-05-15 NOTE — CARE PLAN
The patient's goals for the shift include pain control    The clinical goals for the shift include pt. will remain safe.

## 2024-05-15 NOTE — PROGRESS NOTES
Care Transitions Progress Note:  Plan per medical team: pod 2 spine surgery; PT:OT  Dispo: High intensity.  TCC met with patient to discuss High Intensity AR.  Patient is agreeable and would like referral made to  [facility].  Team Members Present: NP: MD: MIGUEL: CATHERINE  Status: inpatient  Payor:Medicare  Barriers:  Adod:

## 2024-05-16 ENCOUNTER — APPOINTMENT (OUTPATIENT)
Dept: RADIOLOGY | Facility: HOSPITAL | Age: 74
DRG: 454 | End: 2024-05-16
Payer: MEDICARE

## 2024-05-16 LAB
ALBUMIN SERPL BCP-MCNC: 2.9 G/DL (ref 3.4–5)
ALBUMIN SERPL BCP-MCNC: 2.9 G/DL (ref 3.4–5)
ANION GAP SERPL CALC-SCNC: 12 MMOL/L (ref 10–20)
ANION GAP SERPL CALC-SCNC: 14 MMOL/L (ref 10–20)
BUN SERPL-MCNC: 7 MG/DL (ref 6–23)
BUN SERPL-MCNC: 7 MG/DL (ref 6–23)
CALCIUM SERPL-MCNC: 8.5 MG/DL (ref 8.6–10.6)
CALCIUM SERPL-MCNC: 8.5 MG/DL (ref 8.6–10.6)
CHLORIDE SERPL-SCNC: 102 MMOL/L (ref 98–107)
CHLORIDE SERPL-SCNC: 103 MMOL/L (ref 98–107)
CO2 SERPL-SCNC: 25 MMOL/L (ref 21–32)
CO2 SERPL-SCNC: 28 MMOL/L (ref 21–32)
CREAT SERPL-MCNC: 0.38 MG/DL (ref 0.5–1.05)
CREAT SERPL-MCNC: 0.39 MG/DL (ref 0.5–1.05)
EGFRCR SERPLBLD CKD-EPI 2021: >90 ML/MIN/1.73M*2
EGFRCR SERPLBLD CKD-EPI 2021: >90 ML/MIN/1.73M*2
ERYTHROCYTE [DISTWIDTH] IN BLOOD BY AUTOMATED COUNT: 15.4 % (ref 11.5–14.5)
ERYTHROCYTE [DISTWIDTH] IN BLOOD BY AUTOMATED COUNT: 15.7 % (ref 11.5–14.5)
GLUCOSE SERPL-MCNC: 108 MG/DL (ref 74–99)
GLUCOSE SERPL-MCNC: 115 MG/DL (ref 74–99)
HCT VFR BLD AUTO: 29.5 % (ref 36–46)
HCT VFR BLD AUTO: 29.8 % (ref 36–46)
HGB BLD-MCNC: 9.5 G/DL (ref 12–16)
HGB BLD-MCNC: 9.6 G/DL (ref 12–16)
MAGNESIUM SERPL-MCNC: 1.61 MG/DL (ref 1.6–2.4)
MCH RBC QN AUTO: 29.9 PG (ref 26–34)
MCH RBC QN AUTO: 29.9 PG (ref 26–34)
MCHC RBC AUTO-ENTMCNC: 32.2 G/DL (ref 32–36)
MCHC RBC AUTO-ENTMCNC: 32.2 G/DL (ref 32–36)
MCV RBC AUTO: 93 FL (ref 80–100)
MCV RBC AUTO: 93 FL (ref 80–100)
NRBC BLD-RTO: 0 /100 WBCS (ref 0–0)
NRBC BLD-RTO: 0 /100 WBCS (ref 0–0)
PHOSPHATE SERPL-MCNC: 1.8 MG/DL (ref 2.5–4.9)
PHOSPHATE SERPL-MCNC: 2.5 MG/DL (ref 2.5–4.9)
PLATELET # BLD AUTO: 238 X10*3/UL (ref 150–450)
PLATELET # BLD AUTO: 270 X10*3/UL (ref 150–450)
POTASSIUM SERPL-SCNC: 2.8 MMOL/L (ref 3.5–5.3)
POTASSIUM SERPL-SCNC: 3.4 MMOL/L (ref 3.5–5.3)
RBC # BLD AUTO: 3.18 X10*6/UL (ref 4–5.2)
RBC # BLD AUTO: 3.21 X10*6/UL (ref 4–5.2)
SODIUM SERPL-SCNC: 139 MMOL/L (ref 136–145)
SODIUM SERPL-SCNC: 139 MMOL/L (ref 136–145)
WBC # BLD AUTO: 8.9 X10*3/UL (ref 4.4–11.3)
WBC # BLD AUTO: 9.3 X10*3/UL (ref 4.4–11.3)

## 2024-05-16 PROCEDURE — 85027 COMPLETE CBC AUTOMATED: CPT | Mod: 91 | Performed by: STUDENT IN AN ORGANIZED HEALTH CARE EDUCATION/TRAINING PROGRAM

## 2024-05-16 PROCEDURE — 83735 ASSAY OF MAGNESIUM: CPT

## 2024-05-16 PROCEDURE — 77073 BONE LENGTH STUDIES: CPT

## 2024-05-16 PROCEDURE — 36415 COLL VENOUS BLD VENIPUNCTURE: CPT | Performed by: STUDENT IN AN ORGANIZED HEALTH CARE EDUCATION/TRAINING PROGRAM

## 2024-05-16 PROCEDURE — 1200000002 HC GENERAL ROOM WITH TELEMETRY DAILY

## 2024-05-16 PROCEDURE — 2500000001 HC RX 250 WO HCPCS SELF ADMINISTERED DRUGS (ALT 637 FOR MEDICARE OP): Performed by: NURSE PRACTITIONER

## 2024-05-16 PROCEDURE — 2500000001 HC RX 250 WO HCPCS SELF ADMINISTERED DRUGS (ALT 637 FOR MEDICARE OP): Performed by: STUDENT IN AN ORGANIZED HEALTH CARE EDUCATION/TRAINING PROGRAM

## 2024-05-16 PROCEDURE — 2500000001 HC RX 250 WO HCPCS SELF ADMINISTERED DRUGS (ALT 637 FOR MEDICARE OP): Performed by: ANESTHESIOLOGY

## 2024-05-16 PROCEDURE — 2500000006 HC RX 250 W HCPCS SELF ADMINISTERED DRUGS (ALT 637 FOR ALL PAYERS): Performed by: STUDENT IN AN ORGANIZED HEALTH CARE EDUCATION/TRAINING PROGRAM

## 2024-05-16 PROCEDURE — 2500000004 HC RX 250 GENERAL PHARMACY W/ HCPCS (ALT 636 FOR OP/ED): Performed by: STUDENT IN AN ORGANIZED HEALTH CARE EDUCATION/TRAINING PROGRAM

## 2024-05-16 PROCEDURE — 72082 X-RAY EXAM ENTIRE SPI 2/3 VW: CPT | Mod: MUE

## 2024-05-16 PROCEDURE — 80069 RENAL FUNCTION PANEL: CPT | Performed by: STUDENT IN AN ORGANIZED HEALTH CARE EDUCATION/TRAINING PROGRAM

## 2024-05-16 PROCEDURE — 80069 RENAL FUNCTION PANEL: CPT | Mod: 91,MUE | Performed by: STUDENT IN AN ORGANIZED HEALTH CARE EDUCATION/TRAINING PROGRAM

## 2024-05-16 PROCEDURE — 97116 GAIT TRAINING THERAPY: CPT | Mod: GP,CQ

## 2024-05-16 PROCEDURE — 2500000004 HC RX 250 GENERAL PHARMACY W/ HCPCS (ALT 636 FOR OP/ED): Performed by: NURSE PRACTITIONER

## 2024-05-16 PROCEDURE — 97530 THERAPEUTIC ACTIVITIES: CPT | Mod: GP,CQ

## 2024-05-16 PROCEDURE — 72082 X-RAY EXAM ENTIRE SPI 2/3 VW: CPT | Performed by: STUDENT IN AN ORGANIZED HEALTH CARE EDUCATION/TRAINING PROGRAM

## 2024-05-16 PROCEDURE — 2500000004 HC RX 250 GENERAL PHARMACY W/ HCPCS (ALT 636 FOR OP/ED)

## 2024-05-16 PROCEDURE — 85027 COMPLETE CBC AUTOMATED: CPT | Performed by: STUDENT IN AN ORGANIZED HEALTH CARE EDUCATION/TRAINING PROGRAM

## 2024-05-16 RX ORDER — OXYCODONE HYDROCHLORIDE 5 MG/1
7.5 TABLET ORAL EVERY 4 HOURS PRN
Status: DISCONTINUED | OUTPATIENT
Start: 2024-05-16 | End: 2024-05-21 | Stop reason: HOSPADM

## 2024-05-16 RX ORDER — NYSTATIN 100000 [USP'U]/G
1 POWDER TOPICAL 2 TIMES DAILY
Status: DISCONTINUED | OUTPATIENT
Start: 2024-05-16 | End: 2024-05-21 | Stop reason: HOSPADM

## 2024-05-16 RX ORDER — POTASSIUM CHLORIDE 14.9 MG/ML
20 INJECTION INTRAVENOUS ONCE
Status: COMPLETED | OUTPATIENT
Start: 2024-05-17 | End: 2024-05-17

## 2024-05-16 RX ORDER — CYCLOBENZAPRINE HCL 10 MG
5 TABLET ORAL 3 TIMES DAILY
Status: DISCONTINUED | OUTPATIENT
Start: 2024-05-16 | End: 2024-05-21 | Stop reason: HOSPADM

## 2024-05-16 RX ORDER — MAGNESIUM SULFATE HEPTAHYDRATE 40 MG/ML
2 INJECTION, SOLUTION INTRAVENOUS ONCE
Qty: 50 ML | Refills: 0 | Status: COMPLETED | OUTPATIENT
Start: 2024-05-16 | End: 2024-05-16

## 2024-05-16 RX ORDER — POTASSIUM CHLORIDE 14.9 MG/ML
20 INJECTION INTRAVENOUS
Status: COMPLETED | OUTPATIENT
Start: 2024-05-16 | End: 2024-05-16

## 2024-05-16 RX ADMIN — OXYCODONE HYDROCHLORIDE 7.5 MG: 5 TABLET ORAL at 22:42

## 2024-05-16 RX ADMIN — MAGNESIUM SULFATE HEPTAHYDRATE 2 G: 40 INJECTION, SOLUTION INTRAVENOUS at 17:45

## 2024-05-16 RX ADMIN — OXYCODONE HYDROCHLORIDE 5 MG: 5 TABLET ORAL at 09:05

## 2024-05-16 RX ADMIN — POTASSIUM CHLORIDE 20 MEQ: 200 INJECTION, SOLUTION INTRAVENOUS at 18:34

## 2024-05-16 RX ADMIN — SIMVASTATIN 40 MG: 20 TABLET, FILM COATED ORAL at 21:24

## 2024-05-16 RX ADMIN — POTASSIUM PHOSPHATE, MONOBASIC 500 MG: 500 TABLET, SOLUBLE ORAL at 21:23

## 2024-05-16 RX ADMIN — ALUMINUM HYDROXIDE, MAGNESIUM HYDROXIDE, AND SIMETHICONE 30 ML: 1200; 120; 1200 SUSPENSION ORAL at 09:05

## 2024-05-16 RX ADMIN — ACETAMINOPHEN 650 MG: 325 TABLET ORAL at 05:02

## 2024-05-16 RX ADMIN — POLYETHYLENE GLYCOL 3350 17 G: 17 POWDER, FOR SOLUTION ORAL at 09:00

## 2024-05-16 RX ADMIN — POTASSIUM PHOSPHATE, MONOBASIC 500 MG: 500 TABLET, SOLUBLE ORAL at 18:39

## 2024-05-16 RX ADMIN — OXYCODONE HYDROCHLORIDE 5 MG: 5 TABLET ORAL at 01:33

## 2024-05-16 RX ADMIN — OXYCODONE HYDROCHLORIDE 7.5 MG: 5 TABLET ORAL at 18:39

## 2024-05-16 RX ADMIN — ALUMINUM HYDROXIDE, MAGNESIUM HYDROXIDE, AND SIMETHICONE 30 ML: 1200; 120; 1200 SUSPENSION ORAL at 15:31

## 2024-05-16 RX ADMIN — NYSTATIN 1 APPLICATION: 100000 POWDER TOPICAL at 10:40

## 2024-05-16 RX ADMIN — METHOCARBAMOL 500 MG: 500 TABLET ORAL at 09:05

## 2024-05-16 RX ADMIN — SENNOSIDES AND DOCUSATE SODIUM 2 TABLET: 8.6; 5 TABLET ORAL at 09:05

## 2024-05-16 RX ADMIN — SENNOSIDES AND DOCUSATE SODIUM 2 TABLET: 8.6; 5 TABLET ORAL at 21:25

## 2024-05-16 RX ADMIN — CEVIMELINE HYDROCHLORIDE 30 MG: 30 CAPSULE ORAL at 09:05

## 2024-05-16 RX ADMIN — ALUMINUM HYDROXIDE, MAGNESIUM HYDROXIDE, AND SIMETHICONE 30 ML: 1200; 120; 1200 SUSPENSION ORAL at 21:26

## 2024-05-16 RX ADMIN — ACETAMINOPHEN 650 MG: 325 TABLET ORAL at 10:40

## 2024-05-16 RX ADMIN — ACETAMINOPHEN 650 MG: 325 TABLET ORAL at 17:45

## 2024-05-16 RX ADMIN — HEPARIN SODIUM 5000 UNITS: 5000 INJECTION INTRAVENOUS; SUBCUTANEOUS at 09:06

## 2024-05-16 RX ADMIN — CYCLOBENZAPRINE 5 MG: 10 TABLET, FILM COATED ORAL at 21:23

## 2024-05-16 RX ADMIN — ACETAMINOPHEN 650 MG: 325 TABLET ORAL at 22:42

## 2024-05-16 RX ADMIN — PREDNISONE 3 MG: 1 TABLET ORAL at 09:05

## 2024-05-16 RX ADMIN — TIMOLOL MALEATE 1 DROP: 5 SOLUTION/ DROPS OPHTHALMIC at 09:00

## 2024-05-16 RX ADMIN — AMLODIPINE BESYLATE 5 MG: 5 TABLET ORAL at 09:05

## 2024-05-16 RX ADMIN — FENOFIBRATE 54 MG: 54 TABLET ORAL at 09:05

## 2024-05-16 RX ADMIN — GABAPENTIN 100 MG: 100 CAPSULE ORAL at 09:05

## 2024-05-16 RX ADMIN — VENLAFAXINE 75 MG: 75 TABLET ORAL at 09:06

## 2024-05-16 RX ADMIN — HEPARIN SODIUM 5000 UNITS: 5000 INJECTION INTRAVENOUS; SUBCUTANEOUS at 00:15

## 2024-05-16 RX ADMIN — LORAZEPAM 1.5 MG: 0.5 TABLET ORAL at 21:24

## 2024-05-16 RX ADMIN — GABAPENTIN 100 MG: 100 CAPSULE ORAL at 21:24

## 2024-05-16 RX ADMIN — NYSTATIN 1 APPLICATION: 100000 POWDER TOPICAL at 21:33

## 2024-05-16 RX ADMIN — CYCLOBENZAPRINE 5 MG: 10 TABLET, FILM COATED ORAL at 15:31

## 2024-05-16 RX ADMIN — OXYCODONE HYDROCHLORIDE 5 MG: 5 TABLET ORAL at 05:02

## 2024-05-16 RX ADMIN — PANTOPRAZOLE SODIUM 40 MG: 40 TABLET, DELAYED RELEASE ORAL at 09:06

## 2024-05-16 RX ADMIN — HEPARIN SODIUM 5000 UNITS: 5000 INJECTION INTRAVENOUS; SUBCUTANEOUS at 17:45

## 2024-05-16 RX ADMIN — MISOPROSTOL 200 MCG: 200 TABLET ORAL at 09:05

## 2024-05-16 RX ADMIN — BUDESONIDE 9 MG: 3 CAPSULE, COATED PELLETS ORAL at 09:05

## 2024-05-16 RX ADMIN — POTASSIUM CHLORIDE 20 MEQ: 200 INJECTION, SOLUTION INTRAVENOUS at 15:35

## 2024-05-16 RX ADMIN — OXYCODONE HYDROCHLORIDE 7.5 MG: 5 TABLET ORAL at 14:25

## 2024-05-16 ASSESSMENT — COGNITIVE AND FUNCTIONAL STATUS - GENERAL
HELP NEEDED FOR BATHING: A LOT
WALKING IN HOSPITAL ROOM: A LITTLE
TURNING FROM BACK TO SIDE WHILE IN FLAT BAD: A LITTLE
TOILETING: A LITTLE
STANDING UP FROM CHAIR USING ARMS: A LITTLE
CLIMB 3 TO 5 STEPS WITH RAILING: TOTAL
MOVING FROM LYING ON BACK TO SITTING ON SIDE OF FLAT BED WITH BEDRAILS: A LITTLE
PERSONAL GROOMING: A LITTLE
DRESSING REGULAR UPPER BODY CLOTHING: A LOT
MOBILITY SCORE: 16
DRESSING REGULAR LOWER BODY CLOTHING: A LOT
DAILY ACTIVITIY SCORE: 16
MOVING TO AND FROM BED TO CHAIR: A LITTLE

## 2024-05-16 ASSESSMENT — PAIN - FUNCTIONAL ASSESSMENT
PAIN_FUNCTIONAL_ASSESSMENT: 0-10

## 2024-05-16 ASSESSMENT — PAIN SCALES - GENERAL
PAINLEVEL_OUTOF10: 9
PAINLEVEL_OUTOF10: 8
PAINLEVEL_OUTOF10: 9
PAINLEVEL_OUTOF10: 4

## 2024-05-16 ASSESSMENT — PAIN SCALES - WONG BAKER: WONGBAKER_NUMERICALRESPONSE: HURTS WHOLE LOT

## 2024-05-16 NOTE — CARE PLAN
The patient's goals for the shift include pain control    The clinical goals for the shift include pt will better pain manage during this shift    Over the shift, the patient did not make progress toward the following goals. Problem: Pain  Goal: My pain/discomfort is manageable  Outcome: Not Progressing

## 2024-05-16 NOTE — PROGRESS NOTES
"Michelle Jack is a 73 y.o. female on day 8 of admission presenting with Cervical myelopathy (Multi).    Subjective   No acute events, had BM, got out of bed    Objective     Physical Exam  RUE D4 T4 B4 HG4 IO3  RLE HF4+ KE/DF/PF 5  LUE D4- T4- B/HG 4 IO3  LLE HF 4- KE/DF/PF 5  BLE spasticity  Incision c/d/I    Last Recorded Vitals  Blood pressure (!) 150/95, pulse 96, temperature 36 °C (96.8 °F), temperature source Temporal, resp. rate 18, height 1.626 m (5' 4\"), weight 57.4 kg (126 lb 8 oz), SpO2 95%.  Intake/Output last 3 Shifts:  I/O last 3 completed shifts:  In: - (0 mL/kg)   Out: 770 (13.4 mL/kg) [Urine:650 (0.3 mL/kg/hr); Drains:120]  Weight: 57.4 kg     Relevant Results    Assessment/Plan   Principal Problem:    Cervical myelopathy (Multi)  Active Problems:    Status post spinal surgery    Sagittal plane imbalance    Spondylolisthesis, lumbar region    Lumbar spine instability    Lumbar foraminal stenosis    Lumbar spinal stenosis due to adjacent segment disease after fusion procedure    Pseudarthrosis following spinal fusion    Michelle Jack is a 73 y.o. female with h/o HTN, HLD, GERD, RA, IBD, GI ulcer p/w back pain and BLE weakness (L >R) x 5mo, MRI C6-7 severe stenosis w/ kyphotic deformity.     5/13 s/p remocalof L3-5 hardware, L2-3 decommpression, L5-S1 TLIF, T11-pelvis fusion     5/14 drain auto dc'd    Plan:    Floor  Continue drain (will dc at discharge)  Wound care recs  ASA restart POD5  [facility]am recs  PTOT-rehab  SCDs, SQH    Medically ready for discharge      Tommie Summers MD      "

## 2024-05-16 NOTE — PROGRESS NOTES
Physical Therapy    Physical Therapy Treatment    Patient Name: Michelle Jack  MRN: [id number]  Today's Date: 5/16/2024  Time Calculation  Start Time: 1457  Stop Time: 1522  Time Calculation (min): 25 min    Assessment/Plan   PT Assessment  End of Session Communication: Bedside nurse  End of Session Patient Position: Bed, 3 rail up, Alarm off, not on at start of session     PT Plan  Treatment/Interventions: Bed mobility, Transfer training, Gait training, Stair training, Balance training, Neuromuscular re-education, Strengthening, Endurance training, Range of motion, Therapeutic exercise, Therapeutic activity  PT Plan: Skilled PT  PT Frequency: Daily  PT Discharge Recommendations: High intensity level of continued care  PT Recommended Transfer Status: Assistive device  PT - OK to Discharge: Yes (indicates PT eval complete and dc rec determined)      General Visit Information:   PT  Visit  PT Received On: 05/16/24  General  Prior to Session Communication: Bedside nurse  Patient Position Received: Bed, 3 rail up  General Comment: Pt lying in bed on arrival. Reports fatigue but agreeable to therapy and participates fully. pt maintains spinal precautions with minimal cues throughout session. pt tolerating x2 40' trials however unable to progress further at this time. pt however fatigues requiring 3 minutes sitting rest break between trials    Subjective   Precautions:  Precautions  Medical Precautions: Fall precautions  Post-Surgical Precautions: Spinal precautions  Vital Signs:       Objective   Pain:  Pain Assessment  Pain Score: 4  Cognition:  Cognition  Orientation Level: Oriented X4       Treatments:       Bed Mobility 1  Bed Mobility 1: Supine to sitting, Sitting to supine  Level of Assistance 1: Minimum assistance  Bed Mobility Comments 1: HOB flat, cues for log rol    Ambulation/Gait Training 1  Surface 1: Level tile  Device 1: Rolling walker  Assistance 1: Contact guard  Quality of Gait 1: Shuffling gait,  Decreased step length  Comments/Distance (ft) 1: 2x40', cues for posture and step length  Transfer 1  Technique 1: Sit to stand, Stand to sit  Transfer Device 1: Walker  Transfer Level of Assistance 1: Contact guard  Trials/Comments 1: cues for hand placement         Outcome Measures:  [facility] Basic Mobility  Turning from your back to your side while in a flat bed without using bedrails: A little  Moving from lying on your back to sitting on the side of a flat bed without using bedrails: A little  Moving to and from bed to chair (including a wheelchair): A little  Standing up from a chair using your arms (e.g. wheelchair or bedside chair): A little  To walk in hospital room: A little  Climbing 3-5 steps with railing: Total  Basic Mobility - Total Score: 16    Education Documentation  Precautions, taught by Talat Johnson PTA at 5/16/2024  4:25 PM.  Learner: Patient  Readiness: Acceptance  Method: Explanation  Response: Verbalizes Understanding    Education Comments  No comments found.        OP EDUCATION:       Encounter Problems       Encounter Problems (Active)       Balance       STG - Maintains dynamic standing balance with upper extremity support without LOB using device with minimal cues.  (Progressing)       Start:  05/14/24    Expected End:  05/28/24       INTERVENTIONS:  1. Practice standing with minimal support.  2. Educate patient about standing tolerance.  3. Educate patient about independence with gait, transfers, and ADL's.  4. Educate patient about use of assistive device.  5. Educate patient about self-directed care.            Mobility       STG - Patient will ambulate > 200' LRAD modif indep  (Progressing)       Start:  05/14/24    Expected End:  05/28/24               PT Transfers       STG - Patient to transfer to and from sit to supine with log roll modif indep  (Progressing)       Start:  05/14/24    Expected End:  05/28/24            STG - Patient will roll indep  (Progressing)       Start:   05/14/24    Expected End:  05/28/24            STG - Patient will transfer sit to and from stand modif jess LRAD (Progressing)       Start:  05/14/24    Expected End:  05/28/24

## 2024-05-16 NOTE — PROGRESS NOTES
Spiritual Care Visit    Clinical Encounter Type  Visited With: Patient and family together  Routine Visit: Introduction  Continue Visiting: Yes  Surgical Visit: Post-op  Referral From: Family  Referral To:     Mandaeism Encounters  Mandaeism Needs: Prayer    Values/Beliefs  Cultural Requests During Hospitalization: none noted  Spiritual Requests During Hospitalization: support and prayer    Sacramental Encounters  Communion: Ask the patient  Communion Given Indicator: No    Patient Spiritual Care Encounters  Suffering Severity: Moderate  Fear Level: Moderate  Feelings of Loneliness: Moderate  Feelings of Hopelessness: Moderate  Coping: Sometimes demonstrated    Family Spiritual Care Encounters  Family Participation in Care: Consistently demonstrated  Family Support During Treatment: Consistently demonstrated  Caregiver-Patient Relationship: Not compromised         PC-7 Assessment (Level of Unmet Needs)  Existential Struggle: Substantial  Spiritual/Mandaeism Struggle: Substantial  Legacy: Substantial  Relationships: Some  Fear of Death/Dying: Further assess  Values/Medical Decision Making: Further assess  Ritual/Other: Further assess  PC-7 Score: 7    SDAT (Spiritual Distress Assessment Tool)  Need for Life Balance: Substancial evidence of unmet spiritual need  Need for Connection: Some evidence of unmet spiritual need  Need for Values Acknowledgement: Some evidence of unmet spiritual need  Need to Maintain Control: Substancial evidence of unmet spiritual need  Need to Maintain Identity: Substancial evidence of unmet spiritual need  SDAT Score: 8  SDAT Average Score: 1.6    Taxonomy  Intended Effects: Build relationship of care and support, Convey a calming presence, Demonstrate caring and concern, Lessen anxiety  Methods: Demonstrate acceptance, Encourage sharing of feelings, Encourage story-telling, Offer spiritual/Moravian support, Offer emotional support  Interventions: Ask guided questions, Active  listening, Ask guided questions about kristina, Ask questions to bring forth feelings, Discuss concerns, Prayer for healing, Provide a Uatsdin item(s)    Follow up spiritual care visit with patient. Her sister, Christina is bedside. Her other sister will be coming in shortly. One sister gave  an overview of patients life and recent losses. Sister shared that it would be helpful if  had an opportunity to speak with patient privately, as she may open up more about her grief at the sudden death of her , then when she is with family. It did not work out today to meet with patient alone. Pt did share with  that she has good support through her Shinto and her .  assured patient that she is an extra support person should she need to talk. Pt asked for prayer which was lifted up for her.  will continue to visit for support.

## 2024-05-16 NOTE — CONSULTS
Wound Care Consult     Visit Date: 5/16/2024      Patient Name: Michelle Jack         MRN: [id number]           YOB: 1950     Reason for Consult: groin, reassess LLE wound        Wound Assessment:  Wound 05/13/24 Incision Back Lower;Medial;Mid (Active)   Site Assessment Clean;Dry 05/16/24 0917   Kristyn-Wound Assessment Dry;Clean 05/14/24 0205   Closure Glue;Approximated 05/16/24 0917   Sutures/Staple Line Approximated 05/16/24 0917   Drainage Description None 05/15/24 1648   Drainage Amount None 05/15/24 1648   Dressing Transparent film 05/16/24 0917   Dressing Changed New 05/13/24 1339   Dressing Status Clean;Dry;Occlusive 05/14/24 0835       Wound 05/13/24 Skin Tear Tibial Left;Lateral (Active)   Wound Image   05/14/24 1546   Site Assessment Clean;Dry 05/16/24 0917   Kristyn-Wound Assessment Blanchable erythema;Fragile 05/14/24 1546   Non-staged Wound Description Partial thickness 05/16/24 1456   Margins Not attached 05/16/24 1456   Drainage Description Serosanguineous 05/16/24 1456   Drainage Amount Scant 05/16/24 1456   Dressing Other (Comment);Silicone border dressing 05/16/24 1456   Dressing Changed New 05/16/24 1456   Dressing Status Clean;Dry 05/13/24 2008       Wound Team Summary Assessment:   LLE wound reassessed today (last seen on 5/14). Wound appears stable, slightly improved. Cleansed today with Vashe Wash and dried with gauze. Promogran Jing collagen applied to open areas then moistened with NS. A mepilex border dressing was then applied over entire area.     Wound Team Plan: Dressing should be changed next on 5/18. Additional collagen dressing left at bedside.    Patient's right groin with some excoriation (possible moisture/friction related).  Area cleansed with NS and dried with gauze. Small amount of nystatin powder applied followed by cavilon barrier wipe. Area then covered with mepilex lite. This can be done daily or as needed.     Bianka Leyva RN  5/16/2024  3:17 PM

## 2024-05-17 LAB
ALBUMIN SERPL BCP-MCNC: 2.9 G/DL (ref 3.4–5)
ALBUMIN SERPL BCP-MCNC: 3 G/DL (ref 3.4–5)
ANION GAP SERPL CALC-SCNC: 13 MMOL/L (ref 10–20)
ANION GAP SERPL CALC-SCNC: 13 MMOL/L (ref 10–20)
BUN SERPL-MCNC: 8 MG/DL (ref 6–23)
BUN SERPL-MCNC: 8 MG/DL (ref 6–23)
CALCIUM SERPL-MCNC: 8.7 MG/DL (ref 8.6–10.6)
CALCIUM SERPL-MCNC: 8.9 MG/DL (ref 8.6–10.6)
CHLORIDE SERPL-SCNC: 103 MMOL/L (ref 98–107)
CHLORIDE SERPL-SCNC: 104 MMOL/L (ref 98–107)
CO2 SERPL-SCNC: 26 MMOL/L (ref 21–32)
CO2 SERPL-SCNC: 26 MMOL/L (ref 21–32)
CREAT SERPL-MCNC: 0.46 MG/DL (ref 0.5–1.05)
CREAT SERPL-MCNC: 0.46 MG/DL (ref 0.5–1.05)
EGFRCR SERPLBLD CKD-EPI 2021: >90 ML/MIN/1.73M*2
EGFRCR SERPLBLD CKD-EPI 2021: >90 ML/MIN/1.73M*2
ERYTHROCYTE [DISTWIDTH] IN BLOOD BY AUTOMATED COUNT: 15.4 % (ref 11.5–14.5)
ERYTHROCYTE [DISTWIDTH] IN BLOOD BY AUTOMATED COUNT: 15.4 % (ref 11.5–14.5)
GLUCOSE SERPL-MCNC: 126 MG/DL (ref 74–99)
GLUCOSE SERPL-MCNC: 94 MG/DL (ref 74–99)
HCT VFR BLD AUTO: 34.2 % (ref 36–46)
HCT VFR BLD AUTO: 34.3 % (ref 36–46)
HGB BLD-MCNC: 10.2 G/DL (ref 12–16)
HGB BLD-MCNC: 10.7 G/DL (ref 12–16)
MAGNESIUM SERPL-MCNC: 2.05 MG/DL (ref 1.6–2.4)
MCH RBC QN AUTO: 28.3 PG (ref 26–34)
MCH RBC QN AUTO: 29 PG (ref 26–34)
MCHC RBC AUTO-ENTMCNC: 29.7 G/DL (ref 32–36)
MCHC RBC AUTO-ENTMCNC: 31.3 G/DL (ref 32–36)
MCV RBC AUTO: 93 FL (ref 80–100)
MCV RBC AUTO: 95 FL (ref 80–100)
NRBC BLD-RTO: 0 /100 WBCS (ref 0–0)
NRBC BLD-RTO: 0 /100 WBCS (ref 0–0)
PHOSPHATE SERPL-MCNC: 2.1 MG/DL (ref 2.5–4.9)
PHOSPHATE SERPL-MCNC: 2.4 MG/DL (ref 2.5–4.9)
PLATELET # BLD AUTO: 306 X10*3/UL (ref 150–450)
PLATELET # BLD AUTO: 319 X10*3/UL (ref 150–450)
POTASSIUM SERPL-SCNC: 3.5 MMOL/L (ref 3.5–5.3)
POTASSIUM SERPL-SCNC: 4.1 MMOL/L (ref 3.5–5.3)
RBC # BLD AUTO: 3.61 X10*6/UL (ref 4–5.2)
RBC # BLD AUTO: 3.69 X10*6/UL (ref 4–5.2)
SODIUM SERPL-SCNC: 138 MMOL/L (ref 136–145)
SODIUM SERPL-SCNC: 139 MMOL/L (ref 136–145)
WBC # BLD AUTO: 7.6 X10*3/UL (ref 4.4–11.3)
WBC # BLD AUTO: 8.7 X10*3/UL (ref 4.4–11.3)

## 2024-05-17 PROCEDURE — 36415 COLL VENOUS BLD VENIPUNCTURE: CPT | Performed by: STUDENT IN AN ORGANIZED HEALTH CARE EDUCATION/TRAINING PROGRAM

## 2024-05-17 PROCEDURE — 2500000006 HC RX 250 W HCPCS SELF ADMINISTERED DRUGS (ALT 637 FOR ALL PAYERS): Performed by: NURSE PRACTITIONER

## 2024-05-17 PROCEDURE — 2500000004 HC RX 250 GENERAL PHARMACY W/ HCPCS (ALT 636 FOR OP/ED): Performed by: NURSE PRACTITIONER

## 2024-05-17 PROCEDURE — 2500000004 HC RX 250 GENERAL PHARMACY W/ HCPCS (ALT 636 FOR OP/ED): Performed by: STUDENT IN AN ORGANIZED HEALTH CARE EDUCATION/TRAINING PROGRAM

## 2024-05-17 PROCEDURE — 2500000004 HC RX 250 GENERAL PHARMACY W/ HCPCS (ALT 636 FOR OP/ED)

## 2024-05-17 PROCEDURE — 83735 ASSAY OF MAGNESIUM: CPT

## 2024-05-17 PROCEDURE — 2500000001 HC RX 250 WO HCPCS SELF ADMINISTERED DRUGS (ALT 637 FOR MEDICARE OP): Performed by: STUDENT IN AN ORGANIZED HEALTH CARE EDUCATION/TRAINING PROGRAM

## 2024-05-17 PROCEDURE — 2500000001 HC RX 250 WO HCPCS SELF ADMINISTERED DRUGS (ALT 637 FOR MEDICARE OP): Performed by: NURSE PRACTITIONER

## 2024-05-17 PROCEDURE — 80069 RENAL FUNCTION PANEL: CPT | Mod: 91,MUE | Performed by: STUDENT IN AN ORGANIZED HEALTH CARE EDUCATION/TRAINING PROGRAM

## 2024-05-17 PROCEDURE — 1200000002 HC GENERAL ROOM WITH TELEMETRY DAILY

## 2024-05-17 PROCEDURE — 85027 COMPLETE CBC AUTOMATED: CPT | Performed by: STUDENT IN AN ORGANIZED HEALTH CARE EDUCATION/TRAINING PROGRAM

## 2024-05-17 PROCEDURE — 2500000006 HC RX 250 W HCPCS SELF ADMINISTERED DRUGS (ALT 637 FOR ALL PAYERS): Performed by: STUDENT IN AN ORGANIZED HEALTH CARE EDUCATION/TRAINING PROGRAM

## 2024-05-17 PROCEDURE — 2500000001 HC RX 250 WO HCPCS SELF ADMINISTERED DRUGS (ALT 637 FOR MEDICARE OP): Performed by: ANESTHESIOLOGY

## 2024-05-17 PROCEDURE — 97530 THERAPEUTIC ACTIVITIES: CPT | Mod: GP,CQ

## 2024-05-17 PROCEDURE — 80069 RENAL FUNCTION PANEL: CPT | Performed by: STUDENT IN AN ORGANIZED HEALTH CARE EDUCATION/TRAINING PROGRAM

## 2024-05-17 PROCEDURE — 85027 COMPLETE CBC AUTOMATED: CPT | Mod: 91 | Performed by: STUDENT IN AN ORGANIZED HEALTH CARE EDUCATION/TRAINING PROGRAM

## 2024-05-17 PROCEDURE — 97116 GAIT TRAINING THERAPY: CPT | Mod: GP,CQ

## 2024-05-17 RX ORDER — POTASSIUM CHLORIDE 20 MEQ/1
20 TABLET, EXTENDED RELEASE ORAL ONCE
Status: COMPLETED | OUTPATIENT
Start: 2024-05-17 | End: 2024-05-17

## 2024-05-17 RX ORDER — POLYETHYLENE GLYCOL 3350 17 G/17G
17 POWDER, FOR SOLUTION ORAL 3 TIMES DAILY
Status: DISCONTINUED | OUTPATIENT
Start: 2024-05-17 | End: 2024-05-21 | Stop reason: HOSPADM

## 2024-05-17 RX ORDER — DIPHENHYDRAMINE HCL 25 MG
25 CAPSULE ORAL ONCE
Status: DISCONTINUED | OUTPATIENT
Start: 2024-05-17 | End: 2024-05-17

## 2024-05-17 RX ORDER — BISACODYL 10 MG/1
10 SUPPOSITORY RECTAL DAILY PRN
Status: DISCONTINUED | OUTPATIENT
Start: 2024-05-17 | End: 2024-05-21 | Stop reason: HOSPADM

## 2024-05-17 RX ORDER — CEVIMELINE HYDROCHLORIDE 30 MG/1
30 CAPSULE ORAL DAILY
Status: CANCELLED
Start: 2024-05-17

## 2024-05-17 RX ORDER — NAPROXEN SODIUM 220 MG/1
81 TABLET, FILM COATED ORAL DAILY
Status: DISCONTINUED | OUTPATIENT
Start: 2024-05-18 | End: 2024-05-21 | Stop reason: HOSPADM

## 2024-05-17 RX ADMIN — OXYCODONE HYDROCHLORIDE 7.5 MG: 5 TABLET ORAL at 10:58

## 2024-05-17 RX ADMIN — POTASSIUM PHOSPHATE, MONOBASIC 500 MG: 500 TABLET, SOLUBLE ORAL at 13:06

## 2024-05-17 RX ADMIN — SIMVASTATIN 40 MG: 20 TABLET, FILM COATED ORAL at 23:22

## 2024-05-17 RX ADMIN — OXYCODONE HYDROCHLORIDE 7.5 MG: 5 TABLET ORAL at 22:15

## 2024-05-17 RX ADMIN — FENOFIBRATE 54 MG: 54 TABLET ORAL at 09:22

## 2024-05-17 RX ADMIN — VENLAFAXINE 75 MG: 75 TABLET ORAL at 09:22

## 2024-05-17 RX ADMIN — TIMOLOL MALEATE 1 DROP: 5 SOLUTION/ DROPS OPHTHALMIC at 09:34

## 2024-05-17 RX ADMIN — SENNOSIDES AND DOCUSATE SODIUM 2 TABLET: 8.6; 5 TABLET ORAL at 09:22

## 2024-05-17 RX ADMIN — POLYETHYLENE GLYCOL 3350 17 G: 17 POWDER, FOR SOLUTION ORAL at 09:27

## 2024-05-17 RX ADMIN — ACETAMINOPHEN 650 MG: 325 TABLET ORAL at 15:48

## 2024-05-17 RX ADMIN — HEPARIN SODIUM 5000 UNITS: 5000 INJECTION INTRAVENOUS; SUBCUTANEOUS at 23:22

## 2024-05-17 RX ADMIN — HEPARIN SODIUM 5000 UNITS: 5000 INJECTION INTRAVENOUS; SUBCUTANEOUS at 09:23

## 2024-05-17 RX ADMIN — NYSTATIN 1 APPLICATION: 100000 POWDER TOPICAL at 09:34

## 2024-05-17 RX ADMIN — NYSTATIN 1 APPLICATION: 100000 POWDER TOPICAL at 21:00

## 2024-05-17 RX ADMIN — POLYETHYLENE GLYCOL 3350 17 G: 17 POWDER, FOR SOLUTION ORAL at 15:48

## 2024-05-17 RX ADMIN — ACETAMINOPHEN 650 MG: 325 TABLET ORAL at 22:16

## 2024-05-17 RX ADMIN — BUDESONIDE 9 MG: 3 CAPSULE, COATED PELLETS ORAL at 09:20

## 2024-05-17 RX ADMIN — HEPARIN SODIUM 5000 UNITS: 5000 INJECTION INTRAVENOUS; SUBCUTANEOUS at 15:48

## 2024-05-17 RX ADMIN — CYCLOBENZAPRINE 5 MG: 10 TABLET, FILM COATED ORAL at 09:22

## 2024-05-17 RX ADMIN — CYCLOBENZAPRINE 5 MG: 10 TABLET, FILM COATED ORAL at 23:22

## 2024-05-17 RX ADMIN — OXYCODONE HYDROCHLORIDE 5 MG: 5 TABLET ORAL at 06:04

## 2024-05-17 RX ADMIN — GABAPENTIN 100 MG: 100 CAPSULE ORAL at 23:22

## 2024-05-17 RX ADMIN — CYCLOBENZAPRINE 5 MG: 10 TABLET, FILM COATED ORAL at 15:48

## 2024-05-17 RX ADMIN — SENNOSIDES AND DOCUSATE SODIUM 2 TABLET: 8.6; 5 TABLET ORAL at 23:21

## 2024-05-17 RX ADMIN — LORAZEPAM 1.5 MG: 0.5 TABLET ORAL at 23:21

## 2024-05-17 RX ADMIN — CEVIMELINE HYDROCHLORIDE 30 MG: 30 CAPSULE ORAL at 09:21

## 2024-05-17 RX ADMIN — ACETAMINOPHEN 650 MG: 325 TABLET ORAL at 09:22

## 2024-05-17 RX ADMIN — GABAPENTIN 100 MG: 100 CAPSULE ORAL at 09:36

## 2024-05-17 RX ADMIN — ACETAMINOPHEN 650 MG: 325 TABLET ORAL at 04:15

## 2024-05-17 RX ADMIN — HEPARIN SODIUM 5000 UNITS: 5000 INJECTION INTRAVENOUS; SUBCUTANEOUS at 01:10

## 2024-05-17 RX ADMIN — PANTOPRAZOLE SODIUM 40 MG: 40 TABLET, DELAYED RELEASE ORAL at 09:22

## 2024-05-17 RX ADMIN — OXYCODONE HYDROCHLORIDE 7.5 MG: 5 TABLET ORAL at 15:56

## 2024-05-17 RX ADMIN — PREDNISONE 3 MG: 1 TABLET ORAL at 09:21

## 2024-05-17 RX ADMIN — MISOPROSTOL 200 MCG: 200 TABLET ORAL at 09:21

## 2024-05-17 RX ADMIN — AMLODIPINE BESYLATE 5 MG: 5 TABLET ORAL at 09:21

## 2024-05-17 RX ADMIN — POTASSIUM CHLORIDE 20 MEQ: 1500 TABLET, EXTENDED RELEASE ORAL at 13:06

## 2024-05-17 RX ADMIN — ALUMINUM HYDROXIDE, MAGNESIUM HYDROXIDE, AND SIMETHICONE 30 ML: 1200; 120; 1200 SUSPENSION ORAL at 09:33

## 2024-05-17 RX ADMIN — POTASSIUM PHOSPHATE, MONOBASIC 500 MG: 500 TABLET, SOLUBLE ORAL at 09:21

## 2024-05-17 RX ADMIN — POTASSIUM CHLORIDE 20 MEQ: 200 INJECTION, SOLUTION INTRAVENOUS at 01:10

## 2024-05-17 ASSESSMENT — PAIN - FUNCTIONAL ASSESSMENT
PAIN_FUNCTIONAL_ASSESSMENT: 0-10

## 2024-05-17 ASSESSMENT — COGNITIVE AND FUNCTIONAL STATUS - GENERAL
WALKING IN HOSPITAL ROOM: A LITTLE
CLIMB 3 TO 5 STEPS WITH RAILING: TOTAL
TURNING FROM BACK TO SIDE WHILE IN FLAT BAD: A LITTLE
MOVING FROM LYING ON BACK TO SITTING ON SIDE OF FLAT BED WITH BEDRAILS: A LITTLE
MOVING TO AND FROM BED TO CHAIR: A LITTLE
STANDING UP FROM CHAIR USING ARMS: A LITTLE
MOBILITY SCORE: 16

## 2024-05-17 ASSESSMENT — PAIN SCALES - GENERAL
PAINLEVEL_OUTOF10: 9
PAINLEVEL_OUTOF10: 8
PAINLEVEL_OUTOF10: 4
PAINLEVEL_OUTOF10: 5 - MODERATE PAIN
PAINLEVEL_OUTOF10: 9
PAINLEVEL_OUTOF10: 8

## 2024-05-17 ASSESSMENT — PAIN SCALES - PAIN ASSESSMENT IN ADVANCED DEMENTIA (PAINAD): TOTALSCORE: MEDICATION (SEE MAR)

## 2024-05-17 ASSESSMENT — PAIN DESCRIPTION - LOCATION
LOCATION: BACK
LOCATION: BACK

## 2024-05-17 NOTE — PROGRESS NOTES
Physical Therapy    Physical Therapy Treatment    Patient Name: Michelle Jack  MRN: [id number]  Today's Date: 5/17/2024  Time Calculation  Start Time: 1300  Stop Time: 1326  Time Calculation (min): 26 min    Assessment/Plan   PT Assessment  End of Session Communication: Bedside nurse  End of Session Patient Position: Bed, 3 rail up, Alarm off, not on at start of session     PT Plan  Treatment/Interventions: Bed mobility, Transfer training, Gait training, Stair training, Balance training, Neuromuscular re-education, Strengthening, Endurance training, Range of motion, Therapeutic exercise, Therapeutic activity  PT Plan: Skilled PT  PT Frequency: Daily  PT Discharge Recommendations: High intensity level of continued care  PT Recommended Transfer Status: Assistive device  PT - OK to Discharge: Yes (indicates PT eval complete and dc rec determined)      General Visit Information:   PT  Visit  PT Received On: 05/17/24  General  Prior to Session Communication: Bedside nurse  Patient Position Received: Bed, 3 rail up  General Comment: Pt lying in bed on arrival. pt demonstrates continued fatigue with ambulation requiring rest after ambulating 55'. pt demosntrates compliance with spinal precautions however continues to requires cues for safe log roll    Subjective   Precautions:  Precautions  Medical Precautions: Fall precautions  Post-Surgical Precautions: Spinal precautions  Vital Signs:       Objective   Pain:  Pain Assessment  Pain Score: 5 - Moderate pain  Pain Interventions: Repositioned  Cognition:  Cognition  Orientation Level: Oriented X4       Treatments:       Bed Mobility 2  Bed Mobility  2: Sitting to supine  Level of Assistance 2: Moderate assistance  Bed Mobility Comments 2: cues for log roll techniques    Ambulation/Gait Training 1  Surface 1: Level tile  Device 1: Rolling walker  Assistance 1: Contact guard  Quality of Gait 1: Shuffling gait, Decreased step length  Comments/Distance (ft) 1: 2x55, cues for  posture corrections  Transfer 1  Technique 1: Sit to stand, Stand to sit  Transfer Device 1: Walker  Transfer Level of Assistance 1: Contact guard  Trials/Comments 1: cues for hand placement and timing of weight shifts         Outcome Measures:  [facility] Basic Mobility  Turning from your back to your side while in a flat bed without using bedrails: A little  Moving from lying on your back to sitting on the side of a flat bed without using bedrails: A little  Moving to and from bed to chair (including a wheelchair): A little  Standing up from a chair using your arms (e.g. wheelchair or bedside chair): A little  To walk in hospital room: A little  Climbing 3-5 steps with railing: Total  Basic Mobility - Total Score: 16    Education Documentation  Precautions, taught by aTlat Johnson PTA at 5/17/2024  4:50 PM.  Learner: Patient  Readiness: Acceptance  Method: Explanation  Response: Verbalizes Understanding    Education Comments  No comments found.        OP EDUCATION:       Encounter Problems       Encounter Problems (Active)       Balance       STG - Maintains dynamic standing balance with upper extremity support without LOB using device with minimal cues.  (Progressing)       Start:  05/14/24    Expected End:  05/28/24       INTERVENTIONS:  1. Practice standing with minimal support.  2. Educate patient about standing tolerance.  3. Educate patient about independence with gait, transfers, and ADL's.  4. Educate patient about use of assistive device.  5. Educate patient about self-directed care.            Mobility       STG - Patient will ambulate > 200' LRAD modif indep  (Progressing)       Start:  05/14/24    Expected End:  05/28/24               PT Transfers       STG - Patient to transfer to and from sit to supine with log roll modif indep  (Progressing)       Start:  05/14/24    Expected End:  05/28/24            STG - Patient will roll indep  (Progressing)       Start:  05/14/24    Expected End:  05/28/24             STG - Patient will transfer sit to and from stand modif indep LRAD (Progressing)       Start:  05/14/24    Expected End:  05/28/24

## 2024-05-17 NOTE — CARE PLAN
The patient's goals for the shift include pain control    The clinical goals for the shift include pt will better pain manage during this shift    Over the shift, the patient did not make progress toward the following goals. Barriers to progression include ***. Recommendations to address these barriers include ***.    Problem: Pain  Goal: My pain/discomfort is manageable  Outcome: Not Progressing

## 2024-05-17 NOTE — PROGRESS NOTES
"Michelle Jack is a 73 y.o. female on day 9 of admission presenting with Cervical myelopathy (Multi).    Subjective   No acute events, pain improving    Objective     Physical Exam  RUE D4 T4 B4 HG4 IO3  RLE HF4+ KE/DF/PF 5  LUE D4- T4- B/HG 4 IO3  LLE HF 4- KE/DF/PF 5  BLE spasticity  Incision c/d/I    Last Recorded Vitals  Blood pressure 127/87, pulse 99, temperature 37.8 °C (100 °F), temperature source Temporal, resp. rate 15, height 1.626 m (5' 4\"), weight 57.4 kg (126 lb 8 oz), SpO2 93%.  Intake/Output last 3 Shifts:  I/O last 3 completed shifts:  In: 100 (1.7 mL/kg) [IV Piggyback:100]  Out: 265 (4.6 mL/kg) [Urine:250 (0.1 mL/kg/hr); Drains:15]  Weight: 57.4 kg     Relevant Results    Assessment/Plan   Principal Problem:    Cervical myelopathy (Multi)  Active Problems:    Status post spinal surgery    Sagittal plane imbalance    Spondylolisthesis, lumbar region    Lumbar spine instability    Lumbar foraminal stenosis    Lumbar spinal stenosis due to adjacent segment disease after fusion procedure    Pseudarthrosis following spinal fusion    Michelle Jack is a 73 y.o. female with h/o HTN, HLD, GERD, RA, IBD, GI ulcer p/w back pain and BLE weakness (L >R) x 5mo, MRI C6-7 severe stenosis w/ kyphotic deformity.     5/13 s/p remocalof L3-5 hardware, L2-3 decommpression, L5-S1 TLIF, T11-pelvis fusion     5/14 drain auto dc'd    Plan:    Floor  Continue drain (will dc at discharge)  Wound care recs  ASA restart POD5  Sidagam recs  PTOT-rehab  SCDs, SQH    Medically ready for discharge      Kevan Barros MD      "

## 2024-05-18 ENCOUNTER — APPOINTMENT (OUTPATIENT)
Dept: CARDIOLOGY | Facility: HOSPITAL | Age: 74
DRG: 454 | End: 2024-05-18
Payer: MEDICARE

## 2024-05-18 LAB
ALBUMIN SERPL BCP-MCNC: 2.8 G/DL (ref 3.4–5)
ALBUMIN SERPL BCP-MCNC: 2.8 G/DL (ref 3.4–5)
ANION GAP SERPL CALC-SCNC: 13 MMOL/L (ref 10–20)
ANION GAP SERPL CALC-SCNC: 16 MMOL/L (ref 10–20)
BUN SERPL-MCNC: 11 MG/DL (ref 6–23)
BUN SERPL-MCNC: 9 MG/DL (ref 6–23)
CALCIUM SERPL-MCNC: 8.6 MG/DL (ref 8.6–10.6)
CALCIUM SERPL-MCNC: 8.8 MG/DL (ref 8.6–10.6)
CHLORIDE SERPL-SCNC: 103 MMOL/L (ref 98–107)
CHLORIDE SERPL-SCNC: 104 MMOL/L (ref 98–107)
CO2 SERPL-SCNC: 23 MMOL/L (ref 21–32)
CO2 SERPL-SCNC: 26 MMOL/L (ref 21–32)
CREAT SERPL-MCNC: 0.43 MG/DL (ref 0.5–1.05)
CREAT SERPL-MCNC: 0.5 MG/DL (ref 0.5–1.05)
EGFRCR SERPLBLD CKD-EPI 2021: >90 ML/MIN/1.73M*2
EGFRCR SERPLBLD CKD-EPI 2021: >90 ML/MIN/1.73M*2
ERYTHROCYTE [DISTWIDTH] IN BLOOD BY AUTOMATED COUNT: 15.5 % (ref 11.5–14.5)
GLUCOSE SERPL-MCNC: 178 MG/DL (ref 74–99)
GLUCOSE SERPL-MCNC: 88 MG/DL (ref 74–99)
HCT VFR BLD AUTO: 35 % (ref 36–46)
HGB BLD-MCNC: 10.3 G/DL (ref 12–16)
MAGNESIUM SERPL-MCNC: 1.9 MG/DL (ref 1.6–2.4)
MCH RBC QN AUTO: 29.9 PG (ref 26–34)
MCHC RBC AUTO-ENTMCNC: 29.4 G/DL (ref 32–36)
MCV RBC AUTO: 102 FL (ref 80–100)
NRBC BLD-RTO: 0 /100 WBCS (ref 0–0)
PHOSPHATE SERPL-MCNC: 2.7 MG/DL (ref 2.5–4.9)
PHOSPHATE SERPL-MCNC: 3 MG/DL (ref 2.5–4.9)
PLATELET # BLD AUTO: 198 X10*3/UL (ref 150–450)
POTASSIUM SERPL-SCNC: 4 MMOL/L (ref 3.5–5.3)
POTASSIUM SERPL-SCNC: 4.2 MMOL/L (ref 3.5–5.3)
RBC # BLD AUTO: 3.44 X10*6/UL (ref 4–5.2)
SODIUM SERPL-SCNC: 138 MMOL/L (ref 136–145)
SODIUM SERPL-SCNC: 139 MMOL/L (ref 136–145)
WBC # BLD AUTO: 7.3 X10*3/UL (ref 4.4–11.3)

## 2024-05-18 PROCEDURE — 83735 ASSAY OF MAGNESIUM: CPT

## 2024-05-18 PROCEDURE — 1200000002 HC GENERAL ROOM WITH TELEMETRY DAILY

## 2024-05-18 PROCEDURE — 2500000001 HC RX 250 WO HCPCS SELF ADMINISTERED DRUGS (ALT 637 FOR MEDICARE OP): Performed by: ANESTHESIOLOGY

## 2024-05-18 PROCEDURE — 2500000006 HC RX 250 W HCPCS SELF ADMINISTERED DRUGS (ALT 637 FOR ALL PAYERS): Performed by: STUDENT IN AN ORGANIZED HEALTH CARE EDUCATION/TRAINING PROGRAM

## 2024-05-18 PROCEDURE — 97530 THERAPEUTIC ACTIVITIES: CPT | Mod: GP,CQ

## 2024-05-18 PROCEDURE — 2500000001 HC RX 250 WO HCPCS SELF ADMINISTERED DRUGS (ALT 637 FOR MEDICARE OP): Performed by: NURSE PRACTITIONER

## 2024-05-18 PROCEDURE — 85027 COMPLETE CBC AUTOMATED: CPT | Performed by: STUDENT IN AN ORGANIZED HEALTH CARE EDUCATION/TRAINING PROGRAM

## 2024-05-18 PROCEDURE — 2500000004 HC RX 250 GENERAL PHARMACY W/ HCPCS (ALT 636 FOR OP/ED): Performed by: NURSE PRACTITIONER

## 2024-05-18 PROCEDURE — 80069 RENAL FUNCTION PANEL: CPT | Mod: 91,MUE | Performed by: STUDENT IN AN ORGANIZED HEALTH CARE EDUCATION/TRAINING PROGRAM

## 2024-05-18 PROCEDURE — 93005 ELECTROCARDIOGRAM TRACING: CPT

## 2024-05-18 PROCEDURE — 2500000001 HC RX 250 WO HCPCS SELF ADMINISTERED DRUGS (ALT 637 FOR MEDICARE OP): Performed by: STUDENT IN AN ORGANIZED HEALTH CARE EDUCATION/TRAINING PROGRAM

## 2024-05-18 PROCEDURE — 36415 COLL VENOUS BLD VENIPUNCTURE: CPT | Performed by: STUDENT IN AN ORGANIZED HEALTH CARE EDUCATION/TRAINING PROGRAM

## 2024-05-18 PROCEDURE — 2500000001 HC RX 250 WO HCPCS SELF ADMINISTERED DRUGS (ALT 637 FOR MEDICARE OP)

## 2024-05-18 PROCEDURE — 97116 GAIT TRAINING THERAPY: CPT | Mod: GP,CQ

## 2024-05-18 PROCEDURE — 2500000004 HC RX 250 GENERAL PHARMACY W/ HCPCS (ALT 636 FOR OP/ED): Performed by: STUDENT IN AN ORGANIZED HEALTH CARE EDUCATION/TRAINING PROGRAM

## 2024-05-18 PROCEDURE — 2500000004 HC RX 250 GENERAL PHARMACY W/ HCPCS (ALT 636 FOR OP/ED)

## 2024-05-18 PROCEDURE — 80069 RENAL FUNCTION PANEL: CPT | Performed by: STUDENT IN AN ORGANIZED HEALTH CARE EDUCATION/TRAINING PROGRAM

## 2024-05-18 RX ORDER — METHOCARBAMOL 500 MG/1
500 TABLET, FILM COATED ORAL EVERY 8 HOURS SCHEDULED
Status: DISCONTINUED | OUTPATIENT
Start: 2024-05-18 | End: 2024-05-18

## 2024-05-18 RX ORDER — METHOCARBAMOL 100 MG/ML
1000 INJECTION, SOLUTION INTRAMUSCULAR; INTRAVENOUS EVERY 6 HOURS PRN
Status: DISCONTINUED | OUTPATIENT
Start: 2024-05-18 | End: 2024-05-21 | Stop reason: HOSPADM

## 2024-05-18 RX ADMIN — HEPARIN SODIUM 5000 UNITS: 5000 INJECTION INTRAVENOUS; SUBCUTANEOUS at 09:25

## 2024-05-18 RX ADMIN — ACETAMINOPHEN 650 MG: 325 TABLET ORAL at 17:15

## 2024-05-18 RX ADMIN — SIMVASTATIN 40 MG: 20 TABLET, FILM COATED ORAL at 20:40

## 2024-05-18 RX ADMIN — CYCLOBENZAPRINE 5 MG: 10 TABLET, FILM COATED ORAL at 09:21

## 2024-05-18 RX ADMIN — OXYCODONE HYDROCHLORIDE 7.5 MG: 5 TABLET ORAL at 13:17

## 2024-05-18 RX ADMIN — CYCLOBENZAPRINE 5 MG: 10 TABLET, FILM COATED ORAL at 17:15

## 2024-05-18 RX ADMIN — CYCLOBENZAPRINE 5 MG: 10 TABLET, FILM COATED ORAL at 20:41

## 2024-05-18 RX ADMIN — PANTOPRAZOLE SODIUM 40 MG: 40 TABLET, DELAYED RELEASE ORAL at 09:22

## 2024-05-18 RX ADMIN — OXYCODONE HYDROCHLORIDE 7.5 MG: 5 TABLET ORAL at 09:07

## 2024-05-18 RX ADMIN — ACETAMINOPHEN 650 MG: 325 TABLET ORAL at 22:36

## 2024-05-18 RX ADMIN — LORAZEPAM 1.5 MG: 0.5 TABLET ORAL at 20:41

## 2024-05-18 RX ADMIN — POLYETHYLENE GLYCOL 3350 17 G: 17 POWDER, FOR SOLUTION ORAL at 09:22

## 2024-05-18 RX ADMIN — GABAPENTIN 100 MG: 100 CAPSULE ORAL at 20:41

## 2024-05-18 RX ADMIN — SENNOSIDES AND DOCUSATE SODIUM 2 TABLET: 8.6; 5 TABLET ORAL at 09:21

## 2024-05-18 RX ADMIN — OXYCODONE HYDROCHLORIDE 5 MG: 5 TABLET ORAL at 22:36

## 2024-05-18 RX ADMIN — SODIUM CHLORIDE 1000 ML: 9 INJECTION, SOLUTION INTRAVENOUS at 20:42

## 2024-05-18 RX ADMIN — CEVIMELINE HYDROCHLORIDE 30 MG: 30 CAPSULE ORAL at 09:23

## 2024-05-18 RX ADMIN — ASPIRIN 81 MG CHEWABLE TABLET 81 MG: 81 TABLET CHEWABLE at 09:21

## 2024-05-18 RX ADMIN — ACETAMINOPHEN 650 MG: 325 TABLET ORAL at 06:16

## 2024-05-18 RX ADMIN — PREDNISONE 3 MG: 1 TABLET ORAL at 09:22

## 2024-05-18 RX ADMIN — HEPARIN SODIUM 5000 UNITS: 5000 INJECTION INTRAVENOUS; SUBCUTANEOUS at 17:15

## 2024-05-18 RX ADMIN — GABAPENTIN 100 MG: 100 CAPSULE ORAL at 09:21

## 2024-05-18 RX ADMIN — AMLODIPINE BESYLATE 5 MG: 5 TABLET ORAL at 09:22

## 2024-05-18 RX ADMIN — OXYCODONE HYDROCHLORIDE 7.5 MG: 5 TABLET ORAL at 17:41

## 2024-05-18 RX ADMIN — BUDESONIDE 9 MG: 3 CAPSULE, COATED PELLETS ORAL at 09:22

## 2024-05-18 RX ADMIN — TIMOLOL MALEATE 1 DROP: 5 SOLUTION/ DROPS OPHTHALMIC at 09:28

## 2024-05-18 RX ADMIN — METHOCARBAMOL 500 MG: 500 TABLET ORAL at 20:42

## 2024-05-18 RX ADMIN — FENOFIBRATE 54 MG: 54 TABLET ORAL at 09:23

## 2024-05-18 RX ADMIN — METHOCARBAMOL 500 MG: 500 TABLET ORAL at 06:16

## 2024-05-18 RX ADMIN — VENLAFAXINE 75 MG: 75 TABLET ORAL at 09:23

## 2024-05-18 RX ADMIN — METHOCARBAMOL 500 MG: 500 TABLET ORAL at 12:01

## 2024-05-18 RX ADMIN — MISOPROSTOL 200 MCG: 200 TABLET ORAL at 09:23

## 2024-05-18 RX ADMIN — ACETAMINOPHEN 650 MG: 325 TABLET ORAL at 09:21

## 2024-05-18 RX ADMIN — SENNOSIDES AND DOCUSATE SODIUM 2 TABLET: 8.6; 5 TABLET ORAL at 20:41

## 2024-05-18 ASSESSMENT — PAIN - FUNCTIONAL ASSESSMENT
PAIN_FUNCTIONAL_ASSESSMENT: 0-10

## 2024-05-18 ASSESSMENT — PAIN DESCRIPTION - LOCATION
LOCATION: BACK

## 2024-05-18 ASSESSMENT — PAIN SCALES - GENERAL
PAINLEVEL_OUTOF10: 5 - MODERATE PAIN
PAINLEVEL_OUTOF10: 10 - WORST POSSIBLE PAIN
PAINLEVEL_OUTOF10: 6
PAINLEVEL_OUTOF10: 7
PAINLEVEL_OUTOF10: 5 - MODERATE PAIN
PAINLEVEL_OUTOF10: 8

## 2024-05-18 ASSESSMENT — COGNITIVE AND FUNCTIONAL STATUS - GENERAL
STANDING UP FROM CHAIR USING ARMS: A LITTLE
MOVING TO AND FROM BED TO CHAIR: A LITTLE
MOVING FROM LYING ON BACK TO SITTING ON SIDE OF FLAT BED WITH BEDRAILS: A LITTLE
CLIMB 3 TO 5 STEPS WITH RAILING: TOTAL
TURNING FROM BACK TO SIDE WHILE IN FLAT BAD: A LITTLE
WALKING IN HOSPITAL ROOM: A LITTLE
MOBILITY SCORE: 16

## 2024-05-18 ASSESSMENT — PAIN DESCRIPTION - DESCRIPTORS: DESCRIPTORS: ACHING

## 2024-05-18 NOTE — PROGRESS NOTES
Physical Therapy    Physical Therapy Treatment    Patient Name: Michelle Jack  MRN: [id number]  Today's Date: 5/18/2024  Time Calculation  Start Time: 1554  Stop Time: 1635  Time Calculation (min): 41 min    Assessment/Plan   PT Assessment  PT Assessment Results: Decreased strength, Decreased endurance, Impaired balance, Decreased mobility  End of Session Communication: Bedside nurse  End of Session Patient Position: Bed, 3 rail up, Alarm off, not on at start of session     PT Plan  Treatment/Interventions: Bed mobility, Transfer training, Gait training, Stair training, Balance training, Neuromuscular re-education, Strengthening, Endurance training, Range of motion, Therapeutic exercise, Therapeutic activity  PT Plan: Skilled PT  PT Frequency: Daily  PT Discharge Recommendations: High intensity level of continued care  PT Recommended Transfer Status: Assistive device  PT - OK to Discharge: Yes (indicates PT eval complete and dc rec determined)      General Visit Information:   PT  Visit  PT Received On: 05/18/24  General  Family/Caregiver Present: Yes  Caregiver Feedback: Pt's sister present, supportive, and helpful throughout treatment.  Prior to Session Communication: Bedside nurse  Patient Position Received: Bed, 3 rail up, Alarm off, not on at start of session  General Comment: Pt supine in bed upon arrival. Pt pleasant and motivated for therapy.    Subjective   Precautions:  Precautions  Medical Precautions: Fall precautions  Post-Surgical Precautions: Spinal precautions  Vital Signs:  Vital Signs  Heart Rate: (!) 123 (post treatment laying in supine. 130-140s during ambulation with standing and seated rest breaks.)  SpO2: 97 %  BP: 117/80  BP Location: Right arm  BP Method: Automatic  Patient Position: Sitting    Objective   Pain:  Pain Assessment  Pain Assessment:  (Pt give numerical rating, pt states pain is increased with sitting EOB.)  Pain Type: Surgical pain  Pain Location: Back  Pain Orientation:  Mid  Pain Descriptors: Aching  Pain Frequency: Intermittent  Pain Onset: Ongoing  Pain Interventions: Ambulation/increased activity  Response to Interventions: Decreases  Cognition:  Cognition  Overall Cognitive Status: Within Functional Limits  Coordination:       Activity Tolerance:  Activity Tolerance  Endurance: Tolerates 10 - 20 min exercise with multiple rests  Treatments:  Therapeutic Activity  Therapeutic Activity Performed: Yes  Therapeutic Activity 1: Pt sat statically EOB with R lateral lean due to increased pain.  Therapeutic Activity 2: Pt completed bed mobility, functional transfers, and gait training.  Therapeutic Activity 3: Pt educated step by step in proper log roll technique.    Bed Mobility  Bed Mobility: Yes  Bed Mobility 1  Bed Mobility 1: Supine to sitting  Level of Assistance 1: Contact guard  Bed Mobility Comments 1: Use of bed rail. Step by step cuing for log roll.  Bed Mobility 2  Bed Mobility  2: Sitting to supine  Level of Assistance 2: Contact guard  Bed Mobility Comments 2: Use of bed rail. Step by step cuing for log roll.  Bed Mobility 3  Bed Mobility 3: Rolling right, Rolling left  Level of Assistance 3: Contact guard  Bed Mobility Comments 3: Log roll, use of bed rail.    Ambulation/Gait Training  Ambulation/Gait Training Performed: Yes  Ambulation/Gait Training 1  Surface 1: Level tile  Device 1: Rolling walker  Assistance 1: Contact guard  Quality of Gait 1: Decreased step length, Diminished heel strike  Comments/Distance (ft) 1: 60' x2 with seated rest between trials. Pt's HR in 140s during ambulation, pt with multiple standing rest breaks and cues for proper breathing.  Transfers  Transfer: Yes  Transfer 1  Transfer From 1: Bed to  Transfer to 1: Stand  Technique 1: Sit to stand  Transfer Device 1: Walker  Transfer Level of Assistance 1: Contact guard  Trials/Comments 1: Cues for hand placement.  Transfers 2  Transfer From 2: Stand to  Transfer to 2: Sit  Technique 2: Stand to  sit  Transfer Device 2: Walker  Transfer Level of Assistance 2: Contact guard  Trials/Comments 2: Cues to reach back to control descent.  Transfers 3  Transfer From 3: Sit to, Toilet to  Transfer to 3: Toilet, Stand  Technique 3: Sit to stand, Stand to sit  Transfer Device 3: Walker  Transfer Level of Assistance 3: Contact guard    Outcome Measures:  [facility] Basic Mobility  Turning from your back to your side while in a flat bed without using bedrails: A little  Moving from lying on your back to sitting on the side of a flat bed without using bedrails: A little  Moving to and from bed to chair (including a wheelchair): A little  Standing up from a chair using your arms (e.g. wheelchair or bedside chair): A little  To walk in hospital room: A little  Climbing 3-5 steps with railing: Total  Basic Mobility - Total Score: 16    Education Documentation  Precautions, taught by Deonna Hamilton PTA at 5/18/2024  4:48 PM.  Learner: Patient  Readiness: Acceptance  Method: Explanation  Response: Verbalizes Understanding    Mobility Training, taught by Deonna Hamilton PTA at 5/18/2024  4:48 PM.  Learner: Patient  Readiness: Acceptance  Method: Explanation  Response: Verbalizes Understanding    Education Comments  No comments found.        OP EDUCATION:       Encounter Problems       Encounter Problems (Active)       Balance       STG - Maintains dynamic standing balance with upper extremity support without LOB using device with minimal cues.  (Progressing)       Start:  05/14/24    Expected End:  05/28/24       INTERVENTIONS:  1. Practice standing with minimal support.  2. Educate patient about standing tolerance.  3. Educate patient about independence with gait, transfers, and ADL's.  4. Educate patient about use of assistive device.  5. Educate patient about self-directed care.            Mobility       STG - Patient will ambulate > 200' LRAD modif indep  (Progressing)       Start:  05/14/24    Expected End:  05/28/24                PT Transfers       STG - Patient to transfer to and from sit to supine with log roll modif indep  (Progressing)       Start:  05/14/24    Expected End:  05/28/24            STG - Patient will roll indep  (Progressing)       Start:  05/14/24    Expected End:  05/28/24            STG - Patient will transfer sit to and from stand modif indep LRAD (Progressing)       Start:  05/14/24    Expected End:  05/28/24

## 2024-05-18 NOTE — PROGRESS NOTES
"Michelle Jack is a 73 y.o. female on day 10 of admission presenting with Cervical myelopathy (Multi).    Subjective   Continues to have pain now off IV pain meds. Otherwise had BM, getting out of bed    Objective     Physical Exam  RUE D4 T4 B4 HG4 IO3  RLE HF4+ KE/DF/PF 5  LUE D4- T4- B/HG 4 IO3  LLE HF 4- KE/DF/PF 5  BLE spasticity  Incision c/d/I    Last Recorded Vitals  Blood pressure 113/77, pulse 97, temperature 36.7 °C (98.1 °F), resp. rate 18, height 1.626 m (5' 4\"), weight 57.4 kg (126 lb 8 oz), SpO2 94%.  Intake/Output last 3 Shifts:  I/O last 3 completed shifts:  In: 350 (6.1 mL/kg) [I.V.:50 (0.9 mL/kg); IV Piggyback:300]  Out: 5 (0.1 mL/kg) [Drains:5]  Weight: 57.4 kg     Relevant Results    Assessment/Plan   Principal Problem:    Cervical myelopathy (Multi)  Active Problems:    Status post spinal surgery    Sagittal plane imbalance    Spondylolisthesis, lumbar region    Lumbar spine instability    Lumbar foraminal stenosis    Lumbar spinal stenosis due to adjacent segment disease after fusion procedure    Pseudarthrosis following spinal fusion    Michelle Jack is a 73 y.o. female with h/o HTN, HLD, GERD, RA, IBD, GI ulcer p/w back pain and BLE weakness (L >R) x 5mo, MRI C6-7 severe stenosis w/ kyphotic deformity.     5/13 s/p removal of L3-5 hardware, L2-3 decommpression, L5-S1 TLIF, T11-pelvis fusion     5/14 drain auto dc'd    Plan:    Floor  Continue drain (will dc at discharge)  Wound care recs  ASA restart POD5  Sidagam recs  PTOT-rehab  SCDs, SQH    Medically ready for discharge      Tommie Summers MD      "

## 2024-05-19 LAB
ALBUMIN SERPL BCP-MCNC: 2.9 G/DL (ref 3.4–5)
ANION GAP SERPL CALC-SCNC: 16 MMOL/L (ref 10–20)
ATRIAL RATE: 115 BPM
BUN SERPL-MCNC: 9 MG/DL (ref 6–23)
CALCIUM SERPL-MCNC: 8.7 MG/DL (ref 8.6–10.6)
CHLORIDE SERPL-SCNC: 104 MMOL/L (ref 98–107)
CO2 SERPL-SCNC: 23 MMOL/L (ref 21–32)
CREAT SERPL-MCNC: 0.46 MG/DL (ref 0.5–1.05)
EGFRCR SERPLBLD CKD-EPI 2021: >90 ML/MIN/1.73M*2
ERYTHROCYTE [DISTWIDTH] IN BLOOD BY AUTOMATED COUNT: 15.2 % (ref 11.5–14.5)
GLUCOSE SERPL-MCNC: 83 MG/DL (ref 74–99)
HCT VFR BLD AUTO: 31.7 % (ref 36–46)
HGB BLD-MCNC: 10.1 G/DL (ref 12–16)
MAGNESIUM SERPL-MCNC: 1.73 MG/DL (ref 1.6–2.4)
MCH RBC QN AUTO: 28.6 PG (ref 26–34)
MCHC RBC AUTO-ENTMCNC: 31.9 G/DL (ref 32–36)
MCV RBC AUTO: 90 FL (ref 80–100)
NRBC BLD-RTO: 0 /100 WBCS (ref 0–0)
P AXIS: 27 DEGREES
P OFFSET: 197 MS
P ONSET: 156 MS
PHOSPHATE SERPL-MCNC: 3.4 MG/DL (ref 2.5–4.9)
PLATELET # BLD AUTO: 392 X10*3/UL (ref 150–450)
POTASSIUM SERPL-SCNC: 3.8 MMOL/L (ref 3.5–5.3)
PR INTERVAL: 150 MS
Q ONSET: 231 MS
QRS COUNT: 19 BEATS
QRS DURATION: 84 MS
QT INTERVAL: 348 MS
QTC CALCULATION(BAZETT): 481 MS
QTC FREDERICIA: 432 MS
R AXIS: -4 DEGREES
RBC # BLD AUTO: 3.53 X10*6/UL (ref 4–5.2)
SODIUM SERPL-SCNC: 139 MMOL/L (ref 136–145)
T AXIS: 28 DEGREES
T OFFSET: 405 MS
VENTRICULAR RATE: 115 BPM
WBC # BLD AUTO: 8.7 X10*3/UL (ref 4.4–11.3)

## 2024-05-19 PROCEDURE — 2500000006 HC RX 250 W HCPCS SELF ADMINISTERED DRUGS (ALT 637 FOR ALL PAYERS): Performed by: STUDENT IN AN ORGANIZED HEALTH CARE EDUCATION/TRAINING PROGRAM

## 2024-05-19 PROCEDURE — 2500000004 HC RX 250 GENERAL PHARMACY W/ HCPCS (ALT 636 FOR OP/ED): Performed by: NURSE PRACTITIONER

## 2024-05-19 PROCEDURE — 2500000001 HC RX 250 WO HCPCS SELF ADMINISTERED DRUGS (ALT 637 FOR MEDICARE OP)

## 2024-05-19 PROCEDURE — 85027 COMPLETE CBC AUTOMATED: CPT | Performed by: NURSE PRACTITIONER

## 2024-05-19 PROCEDURE — 2500000001 HC RX 250 WO HCPCS SELF ADMINISTERED DRUGS (ALT 637 FOR MEDICARE OP): Performed by: ANESTHESIOLOGY

## 2024-05-19 PROCEDURE — 2500000001 HC RX 250 WO HCPCS SELF ADMINISTERED DRUGS (ALT 637 FOR MEDICARE OP): Performed by: NURSE PRACTITIONER

## 2024-05-19 PROCEDURE — 36415 COLL VENOUS BLD VENIPUNCTURE: CPT | Performed by: NURSE PRACTITIONER

## 2024-05-19 PROCEDURE — 97116 GAIT TRAINING THERAPY: CPT | Mod: GP,CQ

## 2024-05-19 PROCEDURE — 2500000001 HC RX 250 WO HCPCS SELF ADMINISTERED DRUGS (ALT 637 FOR MEDICARE OP): Performed by: STUDENT IN AN ORGANIZED HEALTH CARE EDUCATION/TRAINING PROGRAM

## 2024-05-19 PROCEDURE — 97530 THERAPEUTIC ACTIVITIES: CPT | Mod: GP,CQ

## 2024-05-19 PROCEDURE — 1200000002 HC GENERAL ROOM WITH TELEMETRY DAILY

## 2024-05-19 PROCEDURE — 80069 RENAL FUNCTION PANEL: CPT | Performed by: NURSE PRACTITIONER

## 2024-05-19 PROCEDURE — 83735 ASSAY OF MAGNESIUM: CPT

## 2024-05-19 PROCEDURE — 2500000004 HC RX 250 GENERAL PHARMACY W/ HCPCS (ALT 636 FOR OP/ED): Performed by: STUDENT IN AN ORGANIZED HEALTH CARE EDUCATION/TRAINING PROGRAM

## 2024-05-19 RX ADMIN — FENOFIBRATE 54 MG: 54 TABLET ORAL at 09:37

## 2024-05-19 RX ADMIN — OXYCODONE HYDROCHLORIDE 5 MG: 5 TABLET ORAL at 02:43

## 2024-05-19 RX ADMIN — OXYCODONE HYDROCHLORIDE 7.5 MG: 5 TABLET ORAL at 10:33

## 2024-05-19 RX ADMIN — CYCLOBENZAPRINE 5 MG: 10 TABLET, FILM COATED ORAL at 15:02

## 2024-05-19 RX ADMIN — HEPARIN SODIUM 5000 UNITS: 5000 INJECTION INTRAVENOUS; SUBCUTANEOUS at 15:58

## 2024-05-19 RX ADMIN — SENNOSIDES AND DOCUSATE SODIUM 2 TABLET: 8.6; 5 TABLET ORAL at 09:37

## 2024-05-19 RX ADMIN — HEPARIN SODIUM 5000 UNITS: 5000 INJECTION INTRAVENOUS; SUBCUTANEOUS at 08:00

## 2024-05-19 RX ADMIN — METOPROLOL TARTRATE 12.5 MG: 25 TABLET, FILM COATED ORAL at 09:35

## 2024-05-19 RX ADMIN — HEPARIN SODIUM 5000 UNITS: 5000 INJECTION INTRAVENOUS; SUBCUTANEOUS at 23:20

## 2024-05-19 RX ADMIN — OXYCODONE HYDROCHLORIDE 7.5 MG: 5 TABLET ORAL at 18:57

## 2024-05-19 RX ADMIN — NYSTATIN 1 APPLICATION: 100000 POWDER TOPICAL at 20:37

## 2024-05-19 RX ADMIN — ACETAMINOPHEN 650 MG: 325 TABLET ORAL at 05:10

## 2024-05-19 RX ADMIN — GABAPENTIN 100 MG: 100 CAPSULE ORAL at 20:36

## 2024-05-19 RX ADMIN — SIMVASTATIN 40 MG: 20 TABLET, FILM COATED ORAL at 20:36

## 2024-05-19 RX ADMIN — ACETAMINOPHEN 650 MG: 325 TABLET ORAL at 09:45

## 2024-05-19 RX ADMIN — CYCLOBENZAPRINE 5 MG: 10 TABLET, FILM COATED ORAL at 20:36

## 2024-05-19 RX ADMIN — TIMOLOL MALEATE 1 DROP: 5 SOLUTION/ DROPS OPHTHALMIC at 09:42

## 2024-05-19 RX ADMIN — ACETAMINOPHEN 650 MG: 325 TABLET ORAL at 15:55

## 2024-05-19 RX ADMIN — NYSTATIN 1 APPLICATION: 100000 POWDER TOPICAL at 09:42

## 2024-05-19 RX ADMIN — CEVIMELINE HYDROCHLORIDE 30 MG: 30 CAPSULE ORAL at 09:36

## 2024-05-19 RX ADMIN — PREDNISONE 3 MG: 1 TABLET ORAL at 09:36

## 2024-05-19 RX ADMIN — MISOPROSTOL 200 MCG: 200 TABLET ORAL at 09:36

## 2024-05-19 RX ADMIN — PANTOPRAZOLE SODIUM 40 MG: 40 TABLET, DELAYED RELEASE ORAL at 06:37

## 2024-05-19 RX ADMIN — CYCLOBENZAPRINE 5 MG: 10 TABLET, FILM COATED ORAL at 09:35

## 2024-05-19 RX ADMIN — AMLODIPINE BESYLATE 5 MG: 5 TABLET ORAL at 09:35

## 2024-05-19 RX ADMIN — OXYCODONE HYDROCHLORIDE 5 MG: 5 TABLET ORAL at 06:36

## 2024-05-19 RX ADMIN — GABAPENTIN 100 MG: 100 CAPSULE ORAL at 09:36

## 2024-05-19 RX ADMIN — OXYCODONE HYDROCHLORIDE 7.5 MG: 5 TABLET ORAL at 14:57

## 2024-05-19 RX ADMIN — VENLAFAXINE 75 MG: 75 TABLET ORAL at 09:35

## 2024-05-19 RX ADMIN — BUDESONIDE 9 MG: 3 CAPSULE, COATED PELLETS ORAL at 09:35

## 2024-05-19 RX ADMIN — LORAZEPAM 1.5 MG: 0.5 TABLET ORAL at 20:35

## 2024-05-19 RX ADMIN — ASPIRIN 81 MG CHEWABLE TABLET 81 MG: 81 TABLET CHEWABLE at 09:36

## 2024-05-19 RX ADMIN — ACETAMINOPHEN 650 MG: 325 TABLET ORAL at 22:21

## 2024-05-19 RX ADMIN — HEPARIN SODIUM 5000 UNITS: 5000 INJECTION INTRAVENOUS; SUBCUTANEOUS at 00:22

## 2024-05-19 RX ADMIN — POLYETHYLENE GLYCOL 3350 17 G: 17 POWDER, FOR SOLUTION ORAL at 09:36

## 2024-05-19 ASSESSMENT — COGNITIVE AND FUNCTIONAL STATUS - GENERAL
DAILY ACTIVITIY SCORE: 16
TURNING FROM BACK TO SIDE WHILE IN FLAT BAD: A LITTLE
PERSONAL GROOMING: A LITTLE
MOBILITY SCORE: 16
WALKING IN HOSPITAL ROOM: A LITTLE
TURNING FROM BACK TO SIDE WHILE IN FLAT BAD: A LITTLE
DRESSING REGULAR LOWER BODY CLOTHING: A LOT
CLIMB 3 TO 5 STEPS WITH RAILING: TOTAL
HELP NEEDED FOR BATHING: A LOT
CLIMB 3 TO 5 STEPS WITH RAILING: TOTAL
MOBILITY SCORE: 16
STANDING UP FROM CHAIR USING ARMS: A LITTLE
MOVING FROM LYING ON BACK TO SITTING ON SIDE OF FLAT BED WITH BEDRAILS: A LITTLE
WALKING IN HOSPITAL ROOM: A LITTLE
STANDING UP FROM CHAIR USING ARMS: A LITTLE
MOVING TO AND FROM BED TO CHAIR: A LITTLE
MOVING TO AND FROM BED TO CHAIR: A LITTLE
TOILETING: A LITTLE
DRESSING REGULAR UPPER BODY CLOTHING: A LOT
MOVING FROM LYING ON BACK TO SITTING ON SIDE OF FLAT BED WITH BEDRAILS: A LITTLE

## 2024-05-19 ASSESSMENT — PAIN SCALES - GENERAL
PAINLEVEL_OUTOF10: 7
PAINLEVEL_OUTOF10: 7
PAINLEVEL_OUTOF10: 6
PAINLEVEL_OUTOF10: 7
PAINLEVEL_OUTOF10: 7
PAINLEVEL_OUTOF10: 4
PAINLEVEL_OUTOF10: 7
PAINLEVEL_OUTOF10: 7
PAINLEVEL_OUTOF10: 6
PAINLEVEL_OUTOF10: 10 - WORST POSSIBLE PAIN

## 2024-05-19 ASSESSMENT — PAIN - FUNCTIONAL ASSESSMENT
PAIN_FUNCTIONAL_ASSESSMENT: 0-10

## 2024-05-19 ASSESSMENT — PAIN DESCRIPTION - DESCRIPTORS
DESCRIPTORS: THROBBING
DESCRIPTORS: THROBBING

## 2024-05-19 NOTE — PROGRESS NOTES
Physical Therapy    Physical Therapy Treatment    Patient Name: Michelle Jack  MRN: [id number]  Today's Date: 5/19/2024  Time Calculation  Start Time: 1609  Stop Time: 1636  Time Calculation (min): 27 min    Assessment/Plan   PT Assessment  PT Assessment Results: Decreased strength, Decreased endurance, Impaired balance, Decreased mobility  End of Session Communication: Bedside nurse  End of Session Patient Position: Bed, 3 rail up, Alarm off, not on at start of session     PT Plan  Treatment/Interventions: Bed mobility, Transfer training, Gait training, Stair training, Balance training, Neuromuscular re-education, Strengthening, Endurance training, Range of motion, Therapeutic exercise, Therapeutic activity  PT Plan: Skilled PT  PT Frequency: Daily  PT Discharge Recommendations: High intensity level of continued care  PT Recommended Transfer Status: Assistive device  PT - OK to Discharge: Yes (indicates PT eval complete and dc rec determined)      General Visit Information:   PT  Visit  PT Received On: 05/19/24  General  Family/Caregiver Present: No  Prior to Session Communication: Bedside nurse  Patient Position Received: Bed, 3 rail up, Alarm off, not on at start of session  General Comment: Pt supine in bed upon arrival. Pt pleasant and agreeable to therapy.    Subjective   Precautions:  Precautions  Medical Precautions: Fall precautions  Post-Surgical Precautions: Spinal precautions  Vital Signs:  Vital Signs  Heart Rate: 110 (110-115 thorughout ambulation. 108 post treatment.)    Objective   Pain:     Cognition:  Cognition  Overall Cognitive Status: Within Functional Limits  Coordination:       Activity Tolerance:  Activity Tolerance  Endurance: Tolerates 10 - 20 min exercise with multiple rests  Treatments:  Therapeutic Activity  Therapeutic Activity Performed: Yes  Therapeutic Activity 1: Pt completed dynamic standing at sink with no UE/ unilateral support completing hygiene care with SBA.    Bed  Mobility  Bed Mobility: Yes  Bed Mobility 1  Bed Mobility 1: Supine to sitting  Level of Assistance 1: Contact guard  Bed Mobility Comments 1: Cuing for log roll. Multiple attempts to complete trunk elevation, increased time to complete.  Bed Mobility 2  Bed Mobility  2: Sitting to supine  Level of Assistance 2: Contact guard  Bed Mobility Comments 2: Cues for log roll.    Ambulation/Gait Training  Ambulation/Gait Training Performed: Yes  Ambulation/Gait Training 1  Surface 1: Level tile  Device 1: Rolling walker  Assistance 1: Close supervision  Quality of Gait 1: Decreased step length, Diminished heel strike (Slightly unsteady, lateral sway.)  Comments/Distance (ft) 1: 40' + 70' x2 HR between 110-115 with seated rest break between trials.  Transfers  Transfer: Yes  Transfer 1  Transfer From 1: Sit to  Transfer to 1: Stand  Technique 1: Sit to stand  Transfer Device 1: Walker  Transfer Level of Assistance 1: Contact guard  Trials/Comments 1: Cues for proper hand placement, cues to inhibit pulling from FWW.  Transfers 2  Transfer From 2: Stand to  Transfer to 2: Sit  Technique 2: Stand to sit  Transfer Device 2: Walker  Transfer Level of Assistance 2: Contact guard  Trials/Comments 2: Cues to reach back to control descent.  Transfers 3  Transfer From 3: Sit to, Toilet to  Transfer to 3: Toilet, Stand  Technique 3: Sit to stand, Stand to sit  Transfer Device 3: Walker  Transfer Level of Assistance 3: Contact guard    Outcome Measures:  [facility] Basic Mobility  Turning from your back to your side while in a flat bed without using bedrails: A little  Moving from lying on your back to sitting on the side of a flat bed without using bedrails: A little  Moving to and from bed to chair (including a wheelchair): A little  Standing up from a chair using your arms (e.g. wheelchair or bedside chair): A little  To walk in hospital room: A little  Climbing 3-5 steps with railing: Total  Basic Mobility - Total Score: 16    Education  Documentation  Precautions, taught by Deonna Hamilton PTA at 5/19/2024  4:48 PM.  Learner: Patient  Readiness: Acceptance  Method: Explanation  Response: Verbalizes Understanding    Mobility Training, taught by Deonna Hamilton PTA at 5/19/2024  4:48 PM.  Learner: Patient  Readiness: Acceptance  Method: Explanation  Response: Verbalizes Understanding    Education Comments  No comments found.        OP EDUCATION:       Encounter Problems       Encounter Problems (Active)       Balance       STG - Maintains dynamic standing balance with upper extremity support without LOB using device with minimal cues.  (Progressing)       Start:  05/14/24    Expected End:  05/28/24       INTERVENTIONS:  1. Practice standing with minimal support.  2. Educate patient about standing tolerance.  3. Educate patient about independence with gait, transfers, and ADL's.  4. Educate patient about use of assistive device.  5. Educate patient about self-directed care.            Mobility       STG - Patient will ambulate > 200' LRAD modif indep  (Progressing)       Start:  05/14/24    Expected End:  05/28/24               PT Transfers       STG - Patient to transfer to and from sit to supine with log roll modif indep  (Progressing)       Start:  05/14/24    Expected End:  05/28/24            STG - Patient will roll indep  (Progressing)       Start:  05/14/24    Expected End:  05/28/24            STG - Patient will transfer sit to and from stand modif indep LRAD (Progressing)       Start:  05/14/24    Expected End:  05/28/24

## 2024-05-19 NOTE — SIGNIFICANT EVENT
Rapid Response RN Note    Rapid response RN at bedside for RADAR score 6 due to the recent VS listed below:     Vitals:    05/18/24 1245 05/18/24 1516 05/18/24 1554 05/18/24 1900   BP: 97/60 98/66 117/80 97/66   BP Location:  Right arm Right arm Right arm   Patient Position:  Lying Sitting Lying   Pulse:   (!) 123 (!) 119   Resp: 17 16  17   Temp: 36 °C (96.8 °F) 36.4 °C (97.5 °F)  36 °C (96.8 °F)   TempSrc:  Temporal     SpO2: 95% 95% 97% 94%   Weight:       Height:            Reviewed above VS with bedside RN.  VS within patient's current trends.  No interventions by rapid response team indicated at this time.  Patient denied pain, shortness of breath, dizziness or lightheadedness.  Staff to page rapid response for any concerns or acute change in condition/VS.

## 2024-05-19 NOTE — CARE PLAN
The patient's goals for the shift include pain control    The clinical goals for the shift include pt will better pain manage during this shift    Problem: Pain  Goal: My pain/discomfort is manageable  Outcome: Progressing     Problem: Safety  Goal: I will remain free of falls  Outcome: Progressing     Problem: Daily Care  Goal: Daily care needs are met  Outcome: Progressing     Problem: Skin  Goal: Decreased wound size/increased tissue granulation at next dressing change  Outcome: Progressing     Problem: Skin  Goal: Participates in plan/prevention/treatment measures  Outcome: Progressing     Problem: Skin  Goal: Prevent/manage excess moisture  Outcome: Progressing     Problem: Skin  Goal: Promote/optimize nutrition  Outcome: Progressing     Problem: Skin  Goal: Promote skin healing  Outcome: Progressing

## 2024-05-19 NOTE — PROGRESS NOTES
"Michelle Jack is a 73 y.o. female on day 11 of admission presenting with Cervical myelopathy (Multi).    Subjective   No acute events    Objective     Physical Exam  RUE D4 T4 B4 HG4 IO3  RLE HF4+ KE/DF/PF 5  LUE D4- T4- B/HG 4 IO3  LLE HF 4- KE/DF/PF 5  BLE spasticity  Incision c/d/I    Last Recorded Vitals  Blood pressure 119/82, pulse 107, temperature 36 °C (96.8 °F), resp. rate 16, height 1.626 m (5' 4\"), weight 57.4 kg (126 lb 8 oz), SpO2 93%.  Intake/Output last 3 Shifts:  I/O last 3 completed shifts:  In: 1150 (20 mL/kg) [P.O.:150; IV Piggyback:1000]  Out: 100 (1.7 mL/kg) [Drains:100]  Weight: 57.4 kg     Relevant Results    Assessment/Plan   Principal Problem:    Cervical myelopathy (Multi)  Active Problems:    Status post spinal surgery    Sagittal plane imbalance    Spondylolisthesis, lumbar region    Lumbar spine instability    Lumbar foraminal stenosis    Lumbar spinal stenosis due to adjacent segment disease after fusion procedure    Pseudarthrosis following spinal fusion    Michelle Jack is a 73 y.o. female with h/o HTN, HLD, GERD, RA, IBD, GI ulcer p/w back pain and BLE weakness (L >R) x 5mo, MRI C6-7 severe stenosis w/ kyphotic deformity.     5/13 s/p removal of L3-5 hardware, L2-3 decommpression, L5-S1 TLIF, T11-pelvis fusion     5/14 drain auto dc'd    Plan:    Floor  Continue drain (will dc at discharge)  Wound care recs  ASA restart POD5  Sidagam recs  PTOT-rehab  SCDs, SQH    Medically ready for discharge      Tommie Summers MD      "

## 2024-05-20 LAB
ALBUMIN SERPL BCP-MCNC: 2.9 G/DL (ref 3.4–5)
ANION GAP SERPL CALC-SCNC: 14 MMOL/L (ref 10–20)
BUN SERPL-MCNC: 9 MG/DL (ref 6–23)
CALCIUM SERPL-MCNC: 8.8 MG/DL (ref 8.6–10.6)
CHLORIDE SERPL-SCNC: 104 MMOL/L (ref 98–107)
CO2 SERPL-SCNC: 26 MMOL/L (ref 21–32)
CREAT SERPL-MCNC: 0.39 MG/DL (ref 0.5–1.05)
EGFRCR SERPLBLD CKD-EPI 2021: >90 ML/MIN/1.73M*2
ERYTHROCYTE [DISTWIDTH] IN BLOOD BY AUTOMATED COUNT: 15.3 % (ref 11.5–14.5)
GLUCOSE BLD MANUAL STRIP-MCNC: 110 MG/DL (ref 74–99)
GLUCOSE SERPL-MCNC: 86 MG/DL (ref 74–99)
HCT VFR BLD AUTO: 31 % (ref 36–46)
HGB BLD-MCNC: 9.7 G/DL (ref 12–16)
MAGNESIUM SERPL-MCNC: 1.63 MG/DL (ref 1.6–2.4)
MCH RBC QN AUTO: 29 PG (ref 26–34)
MCHC RBC AUTO-ENTMCNC: 31.3 G/DL (ref 32–36)
MCV RBC AUTO: 93 FL (ref 80–100)
NRBC BLD-RTO: 0.2 /100 WBCS (ref 0–0)
PHOSPHATE SERPL-MCNC: 4.7 MG/DL (ref 2.5–4.9)
PLATELET # BLD AUTO: 449 X10*3/UL (ref 150–450)
POTASSIUM SERPL-SCNC: 3.4 MMOL/L (ref 3.5–5.3)
RBC # BLD AUTO: 3.35 X10*6/UL (ref 4–5.2)
SODIUM SERPL-SCNC: 141 MMOL/L (ref 136–145)
WBC # BLD AUTO: 9.2 X10*3/UL (ref 4.4–11.3)

## 2024-05-20 PROCEDURE — 2500000001 HC RX 250 WO HCPCS SELF ADMINISTERED DRUGS (ALT 637 FOR MEDICARE OP): Performed by: STUDENT IN AN ORGANIZED HEALTH CARE EDUCATION/TRAINING PROGRAM

## 2024-05-20 PROCEDURE — 2500000004 HC RX 250 GENERAL PHARMACY W/ HCPCS (ALT 636 FOR OP/ED): Performed by: NURSE PRACTITIONER

## 2024-05-20 PROCEDURE — 97116 GAIT TRAINING THERAPY: CPT | Mod: GP,CQ

## 2024-05-20 PROCEDURE — 85027 COMPLETE CBC AUTOMATED: CPT | Performed by: NURSE PRACTITIONER

## 2024-05-20 PROCEDURE — 36415 COLL VENOUS BLD VENIPUNCTURE: CPT

## 2024-05-20 PROCEDURE — C9113 INJ PANTOPRAZOLE SODIUM, VIA: HCPCS | Performed by: STUDENT IN AN ORGANIZED HEALTH CARE EDUCATION/TRAINING PROGRAM

## 2024-05-20 PROCEDURE — 2500000004 HC RX 250 GENERAL PHARMACY W/ HCPCS (ALT 636 FOR OP/ED): Performed by: STUDENT IN AN ORGANIZED HEALTH CARE EDUCATION/TRAINING PROGRAM

## 2024-05-20 PROCEDURE — 2500000001 HC RX 250 WO HCPCS SELF ADMINISTERED DRUGS (ALT 637 FOR MEDICARE OP)

## 2024-05-20 PROCEDURE — 2500000001 HC RX 250 WO HCPCS SELF ADMINISTERED DRUGS (ALT 637 FOR MEDICARE OP): Performed by: ANESTHESIOLOGY

## 2024-05-20 PROCEDURE — 2500000006 HC RX 250 W HCPCS SELF ADMINISTERED DRUGS (ALT 637 FOR ALL PAYERS): Performed by: PHYSICIAN ASSISTANT

## 2024-05-20 PROCEDURE — 97535 SELF CARE MNGMENT TRAINING: CPT | Mod: GO

## 2024-05-20 PROCEDURE — 2500000004 HC RX 250 GENERAL PHARMACY W/ HCPCS (ALT 636 FOR OP/ED): Performed by: PHYSICIAN ASSISTANT

## 2024-05-20 PROCEDURE — 83735 ASSAY OF MAGNESIUM: CPT

## 2024-05-20 PROCEDURE — 2500000006 HC RX 250 W HCPCS SELF ADMINISTERED DRUGS (ALT 637 FOR ALL PAYERS): Performed by: STUDENT IN AN ORGANIZED HEALTH CARE EDUCATION/TRAINING PROGRAM

## 2024-05-20 PROCEDURE — 2500000001 HC RX 250 WO HCPCS SELF ADMINISTERED DRUGS (ALT 637 FOR MEDICARE OP): Performed by: NURSE PRACTITIONER

## 2024-05-20 PROCEDURE — 1100000001 HC PRIVATE ROOM DAILY

## 2024-05-20 PROCEDURE — 80069 RENAL FUNCTION PANEL: CPT | Performed by: NURSE PRACTITIONER

## 2024-05-20 PROCEDURE — 82947 ASSAY GLUCOSE BLOOD QUANT: CPT

## 2024-05-20 PROCEDURE — 97530 THERAPEUTIC ACTIVITIES: CPT | Mod: GP,CQ

## 2024-05-20 RX ORDER — POLYETHYLENE GLYCOL 3350 17 G/17G
17 POWDER, FOR SOLUTION ORAL 3 TIMES DAILY
Start: 2024-05-20

## 2024-05-20 RX ORDER — POTASSIUM CHLORIDE 20 MEQ/1
40 TABLET, EXTENDED RELEASE ORAL ONCE
Status: COMPLETED | OUTPATIENT
Start: 2024-05-20 | End: 2024-05-20

## 2024-05-20 RX ORDER — CYCLOBENZAPRINE HCL 5 MG
5 TABLET ORAL 3 TIMES DAILY
Start: 2024-05-20 | End: 2024-05-21 | Stop reason: HOSPADM

## 2024-05-20 RX ORDER — HEPARIN SODIUM 5000 [USP'U]/ML
5000 INJECTION, SOLUTION INTRAVENOUS; SUBCUTANEOUS EVERY 8 HOURS
Start: 2024-05-20 | End: 2024-05-25

## 2024-05-20 RX ORDER — AMOXICILLIN 250 MG
2 CAPSULE ORAL 2 TIMES DAILY
Start: 2024-05-20

## 2024-05-20 RX ORDER — OXYCODONE HYDROCHLORIDE 5 MG/1
5 TABLET ORAL EVERY 6 HOURS PRN
Start: 2024-05-20

## 2024-05-20 RX ORDER — ABATACEPT 125 MG/ML
125 INJECTION, SOLUTION SUBCUTANEOUS
Start: 2024-05-26

## 2024-05-20 RX ORDER — MAGNESIUM SULFATE HEPTAHYDRATE 40 MG/ML
2 INJECTION, SOLUTION INTRAVENOUS ONCE
Status: COMPLETED | OUTPATIENT
Start: 2024-05-20 | End: 2024-05-20

## 2024-05-20 RX ORDER — BUDESONIDE 3 MG/1
9 CAPSULE, COATED PELLETS ORAL DAILY
Start: 2024-05-21

## 2024-05-20 RX ORDER — NYSTATIN 100000 [USP'U]/G
1 POWDER TOPICAL 2 TIMES DAILY
Start: 2024-05-20

## 2024-05-20 RX ADMIN — ACETAMINOPHEN 650 MG: 325 TABLET ORAL at 09:15

## 2024-05-20 RX ADMIN — TIMOLOL MALEATE 1 DROP: 5 SOLUTION/ DROPS OPHTHALMIC at 09:06

## 2024-05-20 RX ADMIN — LORAZEPAM 1.5 MG: 0.5 TABLET ORAL at 20:43

## 2024-05-20 RX ADMIN — CEVIMELINE HYDROCHLORIDE 30 MG: 30 CAPSULE ORAL at 08:56

## 2024-05-20 RX ADMIN — BUDESONIDE 9 MG: 3 CAPSULE, COATED PELLETS ORAL at 08:58

## 2024-05-20 RX ADMIN — GABAPENTIN 100 MG: 100 CAPSULE ORAL at 20:43

## 2024-05-20 RX ADMIN — NYSTATIN 1 APPLICATION: 100000 POWDER TOPICAL at 09:06

## 2024-05-20 RX ADMIN — POTASSIUM CHLORIDE 40 MEQ: 1500 TABLET, EXTENDED RELEASE ORAL at 15:39

## 2024-05-20 RX ADMIN — OXYCODONE HYDROCHLORIDE 5 MG: 5 TABLET ORAL at 10:50

## 2024-05-20 RX ADMIN — ACETAMINOPHEN 650 MG: 325 TABLET ORAL at 15:38

## 2024-05-20 RX ADMIN — HEPARIN SODIUM 5000 UNITS: 5000 INJECTION INTRAVENOUS; SUBCUTANEOUS at 15:40

## 2024-05-20 RX ADMIN — GABAPENTIN 100 MG: 100 CAPSULE ORAL at 09:03

## 2024-05-20 RX ADMIN — CYCLOBENZAPRINE 5 MG: 10 TABLET, FILM COATED ORAL at 20:43

## 2024-05-20 RX ADMIN — CYCLOBENZAPRINE 5 MG: 10 TABLET, FILM COATED ORAL at 09:03

## 2024-05-20 RX ADMIN — HEPARIN SODIUM 5000 UNITS: 5000 INJECTION INTRAVENOUS; SUBCUTANEOUS at 09:03

## 2024-05-20 RX ADMIN — PREDNISONE 3 MG: 1 TABLET ORAL at 08:58

## 2024-05-20 RX ADMIN — ACETAMINOPHEN 650 MG: 325 TABLET ORAL at 22:44

## 2024-05-20 RX ADMIN — OXYCODONE HYDROCHLORIDE 5 MG: 5 TABLET ORAL at 06:20

## 2024-05-20 RX ADMIN — AMLODIPINE BESYLATE 5 MG: 5 TABLET ORAL at 09:03

## 2024-05-20 RX ADMIN — ASPIRIN 81 MG CHEWABLE TABLET 81 MG: 81 TABLET CHEWABLE at 08:56

## 2024-05-20 RX ADMIN — FENOFIBRATE 54 MG: 54 TABLET ORAL at 08:57

## 2024-05-20 RX ADMIN — PANTOPRAZOLE SODIUM 40 MG: 40 INJECTION, POWDER, FOR SOLUTION INTRAVENOUS at 06:10

## 2024-05-20 RX ADMIN — MISOPROSTOL 200 MCG: 200 TABLET ORAL at 08:57

## 2024-05-20 RX ADMIN — MAGNESIUM SULFATE HEPTAHYDRATE 2 G: 40 INJECTION, SOLUTION INTRAVENOUS at 16:52

## 2024-05-20 RX ADMIN — NYSTATIN 1 APPLICATION: 100000 POWDER TOPICAL at 20:43

## 2024-05-20 RX ADMIN — CYCLOBENZAPRINE 5 MG: 10 TABLET, FILM COATED ORAL at 15:39

## 2024-05-20 RX ADMIN — SIMVASTATIN 40 MG: 20 TABLET, FILM COATED ORAL at 20:43

## 2024-05-20 RX ADMIN — VENLAFAXINE 75 MG: 75 TABLET ORAL at 08:56

## 2024-05-20 RX ADMIN — METOPROLOL TARTRATE 12.5 MG: 25 TABLET, FILM COATED ORAL at 09:08

## 2024-05-20 RX ADMIN — SENNOSIDES AND DOCUSATE SODIUM 2 TABLET: 8.6; 5 TABLET ORAL at 08:56

## 2024-05-20 RX ADMIN — OXYCODONE HYDROCHLORIDE 5 MG: 5 TABLET ORAL at 20:48

## 2024-05-20 RX ADMIN — OXYCODONE HYDROCHLORIDE 5 MG: 5 TABLET ORAL at 15:39

## 2024-05-20 RX ADMIN — HEPARIN SODIUM 5000 UNITS: 5000 INJECTION INTRAVENOUS; SUBCUTANEOUS at 23:42

## 2024-05-20 ASSESSMENT — COGNITIVE AND FUNCTIONAL STATUS - GENERAL
EATING MEALS: A LITTLE
TURNING FROM BACK TO SIDE WHILE IN FLAT BAD: A LITTLE
DAILY ACTIVITIY SCORE: 15
PERSONAL GROOMING: A LITTLE
HELP NEEDED FOR BATHING: A LOT
STANDING UP FROM CHAIR USING ARMS: A LITTLE
CLIMB 3 TO 5 STEPS WITH RAILING: TOTAL
MOVING FROM LYING ON BACK TO SITTING ON SIDE OF FLAT BED WITH BEDRAILS: A LITTLE
DRESSING REGULAR UPPER BODY CLOTHING: A LITTLE
DRESSING REGULAR LOWER BODY CLOTHING: A LOT
WALKING IN HOSPITAL ROOM: A LITTLE
DRESSING REGULAR LOWER BODY CLOTHING: A LOT
TOILETING: A LOT
MOBILITY SCORE: 16
DAILY ACTIVITIY SCORE: 15
MOVING TO AND FROM BED TO CHAIR: A LITTLE
WALKING IN HOSPITAL ROOM: A LITTLE
TURNING FROM BACK TO SIDE WHILE IN FLAT BAD: A LITTLE
STANDING UP FROM CHAIR USING ARMS: A LITTLE
MOBILITY SCORE: 16
DRESSING REGULAR UPPER BODY CLOTHING: A LITTLE
CLIMB 3 TO 5 STEPS WITH RAILING: TOTAL
EATING MEALS: A LITTLE
MOVING FROM LYING ON BACK TO SITTING ON SIDE OF FLAT BED WITH BEDRAILS: A LITTLE
PERSONAL GROOMING: A LITTLE
HELP NEEDED FOR BATHING: A LOT
TOILETING: A LOT
MOVING TO AND FROM BED TO CHAIR: A LITTLE

## 2024-05-20 ASSESSMENT — ACTIVITIES OF DAILY LIVING (ADL): HOME_MANAGEMENT_TIME_ENTRY: 23

## 2024-05-20 ASSESSMENT — PAIN SCALES - GENERAL
PAINLEVEL_OUTOF10: 2
PAINLEVEL_OUTOF10: 6
PAINLEVEL_OUTOF10: 4
PAINLEVEL_OUTOF10: 6
PAINLEVEL_OUTOF10: 4
PAINLEVEL_OUTOF10: 4
PAINLEVEL_OUTOF10: 7
PAINLEVEL_OUTOF10: 5 - MODERATE PAIN
PAINLEVEL_OUTOF10: 5 - MODERATE PAIN

## 2024-05-20 ASSESSMENT — PAIN - FUNCTIONAL ASSESSMENT
PAIN_FUNCTIONAL_ASSESSMENT: 0-10

## 2024-05-20 NOTE — PROGRESS NOTES
Physical Therapy    Physical Therapy Treatment    Patient Name: Michelle Jack  MRN: [id number]  Today's Date: 5/20/2024  Time Calculation  Start Time: 0942  Stop Time: 1007  Time Calculation (min): 25 min    Assessment/Plan   PT Assessment  End of Session Communication: Bedside nurse  End of Session Patient Position: Bed, 3 rail up, Alarm off, not on at start of session     PT Plan  Treatment/Interventions: Bed mobility, Transfer training, Gait training, Stair training, Balance training, Neuromuscular re-education, Strengthening, Endurance training, Range of motion, Therapeutic exercise, Therapeutic activity  PT Plan: Skilled PT  PT Frequency: Daily  PT Discharge Recommendations: High intensity level of continued care  PT Recommended Transfer Status: Assistive device  PT - OK to Discharge: Yes (indicates PT eval complete and dc rec determined)      General Visit Information:   PT  Visit  PT Received On: 05/20/24  General  Prior to Session Communication: Bedside nurse  Patient Position Received: Bed, 3 rail up, Alarm off, not on at start of session  General Comment: Pt supine in bed upon arrival. Pt pleasant and agreeable to therapy. pt ambulates 3x60' using wheeled walker requiring seated rest break between trials secondary to onset of fatigue    Subjective   Precautions:  Precautions  Post-Surgical Precautions: Spinal precautions  Vital Signs:       Objective   Pain:  Pain Assessment  Pain Score: 4  Pain Interventions: Repositioned  Cognition:  Cognition  Orientation Level: Oriented X4    Treatments:       Bed Mobility 1  Bed Mobility 1: Supine to sitting  Level of Assistance 1: Contact guard  Bed Mobility Comments 1: HOB elevated, continues cues for log roll    Ambulation/Gait Training 1  Surface 1: Level tile  Device 1: Rolling walker  Assistance 1: Close supervision  Quality of Gait 1: Decreased step length, Diminished heel strike  Comments/Distance (ft) 1: 3x60', cues for walker safety and posture  corrections  Transfer 1  Technique 1: Sit to stand, Stand to sit  Transfer Device 1: Walker  Transfer Level of Assistance 1: Contact guard  Trials/Comments 1: cues for safe pacing         Outcome Measures:  [facility] Basic Mobility  Turning from your back to your side while in a flat bed without using bedrails: A little  Moving from lying on your back to sitting on the side of a flat bed without using bedrails: A little  Moving to and from bed to chair (including a wheelchair): A little  Standing up from a chair using your arms (e.g. wheelchair or bedside chair): A little  To walk in hospital room: A little  Climbing 3-5 steps with railing: Total  Basic Mobility - Total Score: 16    Education Documentation  Mobility Training, taught by Talat Johnson PTA at 5/20/2024  3:35 PM.  Learner: Patient  Readiness: Acceptance  Method: Explanation  Response: Verbalizes Understanding    Education Comments  No comments found.        OP EDUCATION:       Encounter Problems       Encounter Problems (Active)       Balance       STG - Maintains dynamic standing balance with upper extremity support without LOB using device with minimal cues.  (Progressing)       Start:  05/14/24    Expected End:  05/28/24       INTERVENTIONS:  1. Practice standing with minimal support.  2. Educate patient about standing tolerance.  3. Educate patient about independence with gait, transfers, and ADL's.  4. Educate patient about use of assistive device.  5. Educate patient about self-directed care.            Mobility       STG - Patient will ambulate > 200' LRAD modif indep  (Progressing)       Start:  05/14/24    Expected End:  05/28/24               PT Transfers       STG - Patient to transfer to and from sit to supine with log roll modif indep  (Progressing)       Start:  05/14/24    Expected End:  05/28/24            STG - Patient will roll indep  (Progressing)       Start:  05/14/24    Expected End:  05/28/24            STG - Patient will transfer  sit to and from stand nestor URIBE (Progressing)       Start:  05/14/24    Expected End:  05/28/24

## 2024-05-20 NOTE — PROGRESS NOTES
Patient and her sister, Carmella NP, , nurse notified of transport time to  rehabilitation [facility] tomorrow at [phone] am by [facility]. [phone]  Nurse report to [phone]. Blue slip delivered to desk.  Randi Kong RN

## 2024-05-20 NOTE — CARE PLAN
The patient's goals for the shift include pain control    The clinical goals for the shift include pt will remain HDS throughout the shift      Problem: Pain  Goal: My pain/discomfort is manageable  Outcome: Progressing     Problem: Safety  Goal: I will remain free of falls  Outcome: Progressing     Problem: Daily Care  Goal: Daily care needs are met  Outcome: Progressing     Problem: Skin  Goal: Decreased wound size/increased tissue granulation at next dressing change  Outcome: Progressing     Problem: Skin  Goal: Promote skin healing  Outcome: Progressing

## 2024-05-20 NOTE — PROGRESS NOTES
"Michelle Jack is a 73 y.o. female on day 12 of admission presenting with Cervical myelopathy (Multi).    Subjective   No acute events    Objective     Physical Exam  RUE D4 T4 B4 HG4 IO3  RLE HF4+ KE/DF/PF 5  LUE D4- T4- B/HG 4 IO3  LLE HF 4- KE/DF/PF 5  BLE spasticity  Incision c/d/I    Last Recorded Vitals  Blood pressure 131/83, pulse 110, temperature 35.9 °C (96.6 °F), resp. rate 18, height 1.626 m (5' 4\"), weight 57.4 kg (126 lb 8 oz), SpO2 96%.  Intake/Output last 3 Shifts:  I/O last 3 completed shifts:  In: 1150 (20 mL/kg) [P.O.:150; IV Piggyback:1000]  Out: 55 (1 mL/kg) [Drains:55]  Weight: 57.4 kg     Relevant Results    Assessment/Plan   Principal Problem:    Cervical myelopathy (Multi)  Active Problems:    Status post spinal surgery    Sagittal plane imbalance    Spondylolisthesis, lumbar region    Lumbar spine instability    Lumbar foraminal stenosis    Lumbar spinal stenosis due to adjacent segment disease after fusion procedure    Pseudarthrosis following spinal fusion    Michelle Jack is a 73 y.o. female with h/o HTN, HLD, GERD, RA, IBD, GI ulcer p/w back pain and BLE weakness (L >R) x 5mo, MRI C6-7 severe stenosis w/ kyphotic deformity.     5/13 s/p removal of L3-5 hardware, L2-3 decommpression, L5-S1 TLIF, T11-pelvis fusion     5/14 drain auto dc'd    Plan:    Floor  Dc drain  Wound care recs  ASA restart POD5  Sidagam recs  PTOT-rehab  SCDs, SQH    Medically ready for discharge      Meera Blackburn MD      "

## 2024-05-20 NOTE — PROGRESS NOTES
Occupational Therapy    Occupational Therapy Treatment    Name: Michelle Jack  MRN: [id number]  : 1950  Date: 24  Room: [address] [place] [address]      Time Calculation  Start Time: 1408  Stop Time: 1431  Time Calculation (min): 23 min    Assessment:  OT Assessment: Pt presents with decreased independence with ADLs, IADLs, functional transfers and mobiltiy and would benefit from skilled OT intervention to address deficits and facilitate return to PLOF.  Evaluation/Treatment Tolerance: Patient limited by pain  End of Session Communication: Bedside nurse  End of Session Patient Position: Bed, 3 rail up, Alarm off, not on at start of session  Plan:  Treatment Interventions: ADL retraining, Functional transfer training, Compensatory technique education  OT Frequency: 3 times per week  OT Discharge Recommendations: High intensity level of continued care  Equipment Recommended upon Discharge:  (TBD)  OT Recommended Transfer Status: Assist of 1  OT - OK to Discharge: Yes    Subjective   General:  OT Last Visit  OT Received On: 24  Reason for Referral:  s/p remocalof L3-5 hardware, L2-3 decommpression, L5-S1 TLIF, T11-pelvis fusion  Past Medical History Relevant to Rehab: HTN, HLD, GERD, RA, IBD, GI ulcer p/w back pain and BLE weakness (L >R) x 5mo, MRI C6-7 severe stenosis w/ kyphotic deformity.  Prior to Session Communication: Bedside nurse  Patient Position Received: Bed, 3 rail up, Alarm off, not on at start of session  General Comment: pleasant and cooperative, agreeable to OT   Precautions:  Hearing/Visual Limitations: WFL  Medical Precautions: Fall precautions  Post-Surgical Precautions: Spinal precautions    Lines/Tubes/Drains:  Heplocked, tele      Cognition:  Overall Cognitive Status: Within Functional Limits  Orientation Level: Oriented X4  Insight: Within function limits  Impulsive: Mildly    Pain Assessment:  Pain Assessment  Pain Assessment: 0-10  Pain Score: 6  Pain Type: Surgical pain  Pain  Location: Back  Pain Orientation: Lower  Pain Interventions: Repositioned, Ambulation/increased activity     Objective   Activities of Daily Living:    Grooming  Grooming Comments: Pt performed face washing with wipe seated SBA with setup. Pt performed hand washing standing sink side CGA    UE Bathing  UE Bathing Comments: Pt performed UBB seated with wipes SBA with setup, assist with back.    LE Bathing  LE Bathing Comments: Pt performed LBB seated with wipe mod A, VC for spine precautions    UE Dressing  UE Dressing Comments: Pt doffed/donned gown SBA with setup seated, skilled assist for line management    LE Dressing  LE Dressing: Yes  Sock Level of Assistance:  (Pt doffed/donned B socks seated EOB max A with setup)    Toileting  Toileting Comments: Pt performed toileting in bathroom SBA, min A for transfers, SBA for front pericare seated, dependent assist for behind pericare standing with FWW     Bed Mobility/Transfers:     Bed Mobility  Bed Mobility: Yes  Bed Mobility 1  Bed Mobility 1: Supine to sitting, Log roll  Level of Assistance 1: Contact guard, Minimal verbal cues  Bed Mobility Comments 1: HOB elevated, VC for hand palcement and sequencing  Bed Mobility 2  Bed Mobility  2: Sitting to supine, Log roll  Level of Assistance 2: Minimum assistance  Bed Mobility Comments 2: LE support    Transfers  Transfer: Yes  Transfer 1  Transfer From 1: Bed to  Transfer to 1: Stand  Technique 1: Sit to stand  Transfer Device 1: Walker  Transfer Level of Assistance 1: Minimum assistance, Minimal verbal cues  Trials/Comments 1: 1x  Transfers 2  Transfer From 2: Stand to  Transfer to 2: Toilet  Technique 2: Stand to sit  Transfer Device 2: Walker  Transfer Level of Assistance 2: Minimum assistance, Minimal verbal cues  Trials/Comments 2: 1x; VC for use of R grab bar and safety  Transfers 3  Transfer From 3: Toilet to  Transfer to 3: Stand  Technique 3: Sit to stand  Transfer Device 3: Walker  Transfer Level of Assistance  3: Minimum assistance  Trials/Comments 3: 1x  Transfers 4  Transfer From 4: Stand to  Transfer to 4: Bed  Technique 4: Stand to sit  Transfer Device 4: Walker  Transfer Level of Assistance 4: Minimum assistance  Trials/Comments 4: 1x     Therapy/Activity:      Therapeutic Activity  Therapeutic Activity Performed: Yes  Therapeutic Activity 1: Pt sat EOB SBA during ADLs  Therapeutic Activity 2: Pt performed functional mobility min household distance from bed to bathroom to bed with FWW CGA    Outcome Measures:  [facility] Daily Activity  Putting on and taking off regular lower body clothing: A lot  Bathing (including washing, rinsing, drying): A lot  Putting on and taking off regular upper body clothing: A little  Toileting, which includes using toilet, bedpan or urinal: A lot  Taking care of personal grooming such as brushing teeth: A little  Eating Meals: A little  Daily Activity - Total Score: 15     Education Documentation  Precautions, taught by Irina Mas OT at 5/20/2024  2:57 PM.  Learner: Patient  Readiness: Acceptance  Method: Explanation  Response: Needs Reinforcement    Body Mechanics, taught by Irina Mas OT at 5/20/2024  2:57 PM.  Learner: Patient  Readiness: Acceptance  Method: Explanation  Response: Verbalizes Understanding    ADL Training, taught by Irina Mas OT at 5/20/2024  2:57 PM.  Learner: Patient  Readiness: Acceptance  Method: Explanation  Response: Verbalizes Understanding      Goals:  Encounter Problems       Encounter Problems (Active)       ADLs       Patient will perform LB bathing with stand by assist level of assistance and long-handled sponge. (Progressing)       Start:  05/15/24    Expected End:  05/29/24            Patient with complete upper body dressing with independent level of assistance donning and doffing all UE clothes. (Progressing)       Start:  05/15/24    Expected End:  05/29/24            Patient with complete lower body dressing with stand by assist level of  assistance donning and doffing all LE clothes  with PRN adaptive equipment. (Progressing)       Start:  05/15/24    Expected End:  05/29/24            Patient will complete daily grooming tasks with independent level of assistance and PRN adaptive equipment while standing. (Progressing)       Start:  05/15/24    Expected End:  05/29/24            Patient will complete toileting including hygiene clothing management/hygiene with stand by assist level of assistance and grab bars. (Progressing)       Start:  05/15/24    Expected End:  05/29/24               COGNITION/SAFETY       Patient will recall and adhere to spinal precuations with all ADL and functional mobility in order to promote healing and safety with functional tasks (Progressing)       Start:  05/15/24    Expected End:  05/29/24               MOBILITY       Patient will perform Functional mobility mod  Household distances/Community Distances with stand by assist level of assistance and least restrictive device in order to improve safety and functional mobility. (Progressing)       Start:  05/15/24    Expected End:  05/29/24               TRANSFERS       Patient will perform bed mobility stand by assist level of assistance and bed rails in order to improve safety and independence with mobility (Progressing)       Start:  05/15/24    Expected End:  05/29/24            Patient will complete all functional transfers with least restrictive device with stand by assist level of assistance. (Progressing)       Start:  05/15/24    Expected End:  05/29/24 05/20/24 at 2:57 PM   Irina Mas, OT   [phone]

## 2024-05-20 NOTE — CARE PLAN
Problem: Pain  Goal: My pain/discomfort is manageable  Outcome: Progressing     Problem: Safety  Goal: I will remain free of falls  Outcome: Progressing     Problem: Skin  Goal: Participates in plan/prevention/treatment measures  Outcome: Progressing   The patient's goals for the shift include pain control    The clinical goals for the shift include pt will remain HDS throughout the shift    Over the shift, the patient did make progress toward the following goals.

## 2024-05-21 VITALS
OXYGEN SATURATION: 97 % | HEART RATE: 110 BPM | TEMPERATURE: 96.8 F | BODY MASS INDEX: 21.6 KG/M2 | HEIGHT: 64 IN | SYSTOLIC BLOOD PRESSURE: 117 MMHG | DIASTOLIC BLOOD PRESSURE: 77 MMHG | RESPIRATION RATE: 18 BRPM | WEIGHT: 126.5 LBS

## 2024-05-21 LAB
ATRIAL RATE: 117 BPM
P AXIS: 74 DEGREES
PR INTERVAL: 192 MS
Q ONSET: 214 MS
QRS COUNT: 19 BEATS
QRS DURATION: 88 MS
QT INTERVAL: 330 MS
QTC CALCULATION(BAZETT): 460 MS
QTC FREDERICIA: 412 MS
R AXIS: 29 DEGREES
T AXIS: 33 DEGREES
T OFFSET: 379 MS
VENTRICULAR RATE: 117 BPM

## 2024-05-21 PROCEDURE — 2500000004 HC RX 250 GENERAL PHARMACY W/ HCPCS (ALT 636 FOR OP/ED)

## 2024-05-21 PROCEDURE — 2500000001 HC RX 250 WO HCPCS SELF ADMINISTERED DRUGS (ALT 637 FOR MEDICARE OP): Performed by: NURSE PRACTITIONER

## 2024-05-21 PROCEDURE — 2500000001 HC RX 250 WO HCPCS SELF ADMINISTERED DRUGS (ALT 637 FOR MEDICARE OP): Performed by: STUDENT IN AN ORGANIZED HEALTH CARE EDUCATION/TRAINING PROGRAM

## 2024-05-21 PROCEDURE — 2500000006 HC RX 250 W HCPCS SELF ADMINISTERED DRUGS (ALT 637 FOR ALL PAYERS): Performed by: STUDENT IN AN ORGANIZED HEALTH CARE EDUCATION/TRAINING PROGRAM

## 2024-05-21 PROCEDURE — 2500000001 HC RX 250 WO HCPCS SELF ADMINISTERED DRUGS (ALT 637 FOR MEDICARE OP)

## 2024-05-21 PROCEDURE — 2500000004 HC RX 250 GENERAL PHARMACY W/ HCPCS (ALT 636 FOR OP/ED): Performed by: STUDENT IN AN ORGANIZED HEALTH CARE EDUCATION/TRAINING PROGRAM

## 2024-05-21 PROCEDURE — 2500000001 HC RX 250 WO HCPCS SELF ADMINISTERED DRUGS (ALT 637 FOR MEDICARE OP): Performed by: PHYSICIAN ASSISTANT

## 2024-05-21 PROCEDURE — 2500000001 HC RX 250 WO HCPCS SELF ADMINISTERED DRUGS (ALT 637 FOR MEDICARE OP): Performed by: ANESTHESIOLOGY

## 2024-05-21 RX ORDER — METOPROLOL TARTRATE 25 MG/1
12.5 TABLET, FILM COATED ORAL EVERY 8 HOURS PRN
Start: 2024-05-21

## 2024-05-21 RX ADMIN — PANTOPRAZOLE SODIUM 40 MG: 40 TABLET, DELAYED RELEASE ORAL at 06:06

## 2024-05-21 RX ADMIN — CEVIMELINE HYDROCHLORIDE 30 MG: 30 CAPSULE ORAL at 09:44

## 2024-05-21 RX ADMIN — LISINOPRIL 10 MG: 10 TABLET ORAL at 09:42

## 2024-05-21 RX ADMIN — METHOCARBAMOL 1000 MG: 100 INJECTION INTRAMUSCULAR; INTRAVENOUS at 04:46

## 2024-05-21 RX ADMIN — OXYCODONE HYDROCHLORIDE 5 MG: 5 TABLET ORAL at 04:46

## 2024-05-21 RX ADMIN — PREDNISONE 3 MG: 1 TABLET ORAL at 09:43

## 2024-05-21 RX ADMIN — MISOPROSTOL 200 MCG: 200 TABLET ORAL at 09:44

## 2024-05-21 RX ADMIN — ASPIRIN 81 MG CHEWABLE TABLET 81 MG: 81 TABLET CHEWABLE at 09:42

## 2024-05-21 RX ADMIN — HEPARIN SODIUM 5000 UNITS: 5000 INJECTION INTRAVENOUS; SUBCUTANEOUS at 09:42

## 2024-05-21 RX ADMIN — CYCLOBENZAPRINE 5 MG: 10 TABLET, FILM COATED ORAL at 09:45

## 2024-05-21 RX ADMIN — FENOFIBRATE 54 MG: 54 TABLET ORAL at 09:43

## 2024-05-21 RX ADMIN — ACETAMINOPHEN 650 MG: 325 TABLET ORAL at 09:42

## 2024-05-21 RX ADMIN — ACETAMINOPHEN 650 MG: 325 TABLET ORAL at 04:39

## 2024-05-21 RX ADMIN — GABAPENTIN 100 MG: 100 CAPSULE ORAL at 09:42

## 2024-05-21 RX ADMIN — VENLAFAXINE 75 MG: 75 TABLET ORAL at 09:43

## 2024-05-21 RX ADMIN — AMLODIPINE BESYLATE 5 MG: 5 TABLET ORAL at 09:44

## 2024-05-21 RX ADMIN — OXYCODONE HYDROCHLORIDE 7.5 MG: 5 TABLET ORAL at 09:58

## 2024-05-21 RX ADMIN — TIMOLOL MALEATE 1 DROP: 5 SOLUTION/ DROPS OPHTHALMIC at 09:42

## 2024-05-21 RX ADMIN — BUDESONIDE 9 MG: 3 CAPSULE, COATED PELLETS ORAL at 09:45

## 2024-05-21 ASSESSMENT — COGNITIVE AND FUNCTIONAL STATUS - GENERAL
DRESSING REGULAR UPPER BODY CLOTHING: A LITTLE
DAILY ACTIVITIY SCORE: 15
EATING MEALS: A LITTLE
PERSONAL GROOMING: A LITTLE
MOBILITY SCORE: 16
TOILETING: A LOT
WALKING IN HOSPITAL ROOM: A LITTLE
STANDING UP FROM CHAIR USING ARMS: A LITTLE
HELP NEEDED FOR BATHING: A LOT
CLIMB 3 TO 5 STEPS WITH RAILING: TOTAL
MOVING TO AND FROM BED TO CHAIR: A LITTLE
DRESSING REGULAR LOWER BODY CLOTHING: A LOT
TURNING FROM BACK TO SIDE WHILE IN FLAT BAD: A LITTLE
MOVING FROM LYING ON BACK TO SITTING ON SIDE OF FLAT BED WITH BEDRAILS: A LITTLE

## 2024-05-21 ASSESSMENT — PAIN DESCRIPTION - DESCRIPTORS
DESCRIPTORS: THROBBING
DESCRIPTORS: THROBBING

## 2024-05-21 ASSESSMENT — PAIN DESCRIPTION - ORIENTATION: ORIENTATION: LOWER

## 2024-05-21 ASSESSMENT — PAIN DESCRIPTION - LOCATION: LOCATION: BACK

## 2024-05-21 ASSESSMENT — PAIN SCALES - GENERAL
PAINLEVEL_OUTOF10: 6
PAINLEVEL_OUTOF10: 8
PAINLEVEL_OUTOF10: 2
PAINLEVEL_OUTOF10: 6

## 2024-05-21 ASSESSMENT — PAIN - FUNCTIONAL ASSESSMENT
PAIN_FUNCTIONAL_ASSESSMENT: 0-10

## 2024-05-21 NOTE — CARE PLAN
Problem: Pain  Goal: My pain/discomfort is manageable  Outcome: Progressing     Problem: Safety  Goal: I will remain free of falls  Outcome: Progressing     Problem: Daily Care  Goal: Daily care needs are met  Outcome: Progressing     Problem: Skin  Goal: Participates in plan/prevention/treatment measures  Outcome: Progressing   The patient's goals for the shift include pain control    The clinical goals for the shift include pt will remain free of falls    Over the shift, the patient did make progress toward the following goals.

## 2024-05-21 NOTE — PROGRESS NOTES
"Michelle Jack is a 73 y.o. female on day 13 of admission presenting with Cervical myelopathy (Multi).    Subjective   No acute events    Objective     Physical Exam  RUE D4 T4 B4 HG4 IO3  RLE HF4+ KE/DF/PF 5  LUE D4- T4- B/HG 4 IO3  LLE HF 4- KE/DF/PF 5  BLE spasticity  Incision c/d/I    Last Recorded Vitals  Blood pressure 134/72, pulse 102, temperature 36 °C (96.8 °F), temperature source Temporal, resp. rate 18, height 1.626 m (5' 4\"), weight 57.4 kg (126 lb 8 oz), SpO2 96%.  Intake/Output last 3 Shifts:  I/O last 3 completed shifts:  In: - (0 mL/kg)   Out: 75 (1.3 mL/kg) [Drains:75]  Weight: 57.4 kg     Relevant Results    Assessment/Plan   Principal Problem:    Cervical myelopathy (Multi)  Active Problems:    Status post spinal surgery    Sagittal plane imbalance    Spondylolisthesis, lumbar region    Lumbar spine instability    Lumbar foraminal stenosis    Lumbar spinal stenosis due to adjacent segment disease after fusion procedure    Pseudarthrosis following spinal fusion    Michelle Jack is a 73 y.o. female with h/o HTN, HLD, GERD, RA, IBD, GI ulcer p/w back pain and BLE weakness (L >R) x 5mo, MRI C6-7 severe stenosis w/ kyphotic deformity.     5/13 s/p removal of L3-5 hardware, L2-3 decommpression, L5-S1 TLIF, T11-pelvis fusion   5/14 drain auto dc'd  5/20 drain dc'd    Plan:  Floor  Con't ASA 81mg  Wound care recs  PTOT-rehab  SCDs, SQH    Medically ready for discharge      Sinan Bates MD      "

## 2024-05-21 NOTE — NURSING NOTE
Patient anticipated to discharge to [facility], BETHANY in discharge paperwork, PIV x2 removed, clean dry dressings applied, patient belongings returned, report called to [facility] nursing staff Vidal with no acute concerns noted, anticipated to leave via [facility].

## 2024-05-21 NOTE — DISCHARGE SUMMARY
Discharge Diagnosis  Cervical myelopathy (Multi)    Test Results Pending At Discharge  Pending Labs       No current pending labs.            Hospital Course  73 year old female with PMH of HTN, HLD, GERD, RA, IBD, GI ulcer, prior L3-4 fusion (2023), L4-5 lami and fusion (2020).   5/8 Presented to the ED with back pain and BLE weakness, parasthesia (L>R) x 5 months. MRI CS w/ C6-7 severe stenosis and kyphotic deformity. 5/9 BLE DVT US neg, TTE EF 65-70%. 5/13 s/p removal of L3-L5 hardware, L2-3 decompression, L5-S1 TLIF, L2-3 PCO, T11-pelvis instrumented fusion, use of BMP. 5/14 Hgb trend down to 8, 1 unit PRBC with improvement of Hgb to 10.2 s/p transfusion. Rapid response called for low BP, asymptomatic. Given IVF and hydrocortisone x3 doses. Restarted on home prednisone. Wound care consulted for skin tear to left shin, cleanse and mepilex twice weekly. 5/16 EOS with good alignment.  Aspirin restarted 5/18 POD5. Surgical drain removed 5/20 without complication. PT/OT recommends rehab at time of discharge. On the day of discharge, the patient was seen and evaluated by the neurosurgery team and deemed suitable for discharge to Rehab.  There were no significant events overnight. Vitals were reviewed and stable. Labs were stable at discharge. The patient was given detailed discharge instructions and were scheduled to follow up as an outpatient.     Pertinent Physical Exam At Time of Discharge  Constitutional: A&Ox3, calm and cooperative, NAD.  Eyes: PERRL, clear sclera.   ENMT: Moist mucous membranes, no apparent injuries or lesions.  Head/Neck: Neck supple, no JVD. NC/AT.   Respiratory/Thorax: CTAB, regular respirations on RA. Good symmetric chest expansion.   Gastrointestinal: Abdomen soft, non tender.   Extremities: RUE D4 T4 B4 HG4 IO3. RLE HF4+ KE/DF/PF 5. LUE D4- T4- B/HG 4 IO3. LLE HF 4- KE/DF/PF 5. BLE spasticity.  Neurological: A&Ox3.   Psychological: Appropriate mood and behavior.   Skin: Surgical incision  C/D/I.     Home Medications     Medication List      START taking these medications     heparin (porcine) 5,000 unit/mL injection; Inject 1 mL (5,000 Units)   under the skin every 8 hours for 13 doses.   metoprolol tartrate 25 mg tablet; Commonly known as: Lopressor; Take 0.5   tablets (12.5 mg) by mouth every 8 hours if needed (HR > 110).   nystatin 100,000 unit/gram powder; Commonly known as: Mycostatin; Apply   1 Application topically 2 times a day.   oxyCODONE 5 mg immediate release tablet; Commonly known as: Roxicodone;   Take 1 tablet (5 mg) by mouth every 6 hours if needed for severe pain (7 -   10) or moderate pain (4 - 6).   polyethylene glycol 17 gram packet; Commonly known as: Glycolax,   Miralax; Take 17 g by mouth 3 times a day.   sennosides-docusate sodium 8.6-50 mg tablet; Commonly known as:   Kristyn-Colace; Take 2 tablets by mouth 2 times a day.     CHANGE how you take these medications     * budesonide EC 3 mg 24 hr capsule; Commonly known as: Entocort EC; What   changed: Another medication with the same name was added. Make sure you   understand how and when to take each., Another medication with the same   name was removed. Continue taking this medication, and follow the   directions you see here.   * budesonide EC 3 mg 24 hr capsule; Commonly known as: Entocort EC; Take   3 capsules (9 mg) by mouth once daily.; What changed: You were already   taking a medication with the same name, and this prescription was added.   Make sure you understand how and when to take each.; Replaces: budesonide   ER 9 mg tablet   Orencia ClickJect 125 mg/mL auto-injector auto-injection; Generic drug:   abatacept; Inject 1 mL (125 mg) under the skin 1 (one) time per week. DO   NOT RESTART UNTIL DISCUSSED AT FOLLOW UP NEUROSURGERY APPOINTMENT; Start   taking on: May 26, 2024; What changed: additional instructions  * This list has 2 medication(s) that are the same as other medications   prescribed for you. Read the  directions carefully, and ask your doctor or   other care provider to review them with you.     CONTINUE taking these medications     acetaminophen 325 mg tablet; Commonly known as: Tylenol   amLODIPine 5 mg tablet; Commonly known as: Norvasc   aspirin 81 mg EC tablet   Calcium 600 + D(3) 600 mg-5 mcg (200 unit) tablet; Generic drug: calcium   carbonate-vitamin D3   Centrum Silver 0.4 mg-300 mcg- 250 mcg; Generic drug: multivitamin with   minerals iron-free   cevimeline 30 mg capsule; Commonly known as: Evoxac   cholecalciferol 50 mcg (2,000 unit) capsule; Commonly known as: Vitamin   D-3   fenofibrate 48 mg tablet; Commonly known as: Tricor   gabapentin 100 mg capsule; Commonly known as: Neurontin   lisinopril 10 mg tablet   LORazepam 1 mg tablet; Commonly known as: Ativan   Lovaza 1 gram capsule; Generic drug: omega-3 acid ethyl esters   melatonin 3 mg tablet   methocarbamol 500 mg tablet; Commonly known as: Robaxin   miSOPROStoL 200 mcg tablet; Commonly known as: Cytotec   omeprazole 40 mg DR capsule; Commonly known as: PriLOSEC   predniSONE 1 mg tablet; Commonly known as: Deltasone   Prolia 60 mg/mL syringe; Generic drug: denosumab   Restasis 0.05 % ophthalmic emulsion; Generic drug: cycloSPORINE   simvastatin 40 mg tablet; Commonly known as: Zocor   timolol 0.5 % ophthalmic solution; Commonly known as: Timoptic   venlafaxine 75 mg 24 hr tablet     STOP taking these medications     budesonide ER 9 mg tablet; Commonly known as: Uceris; Replaced by:   budesonide EC 3 mg 24 hr capsule; You also have another medication with   the same name that you need to continue taking as instructed.   CeleBREX 200 mg capsule; Generic drug: celecoxib   traMADol 50 mg tablet; Commonly known as: Ultram     Outpatient Follow-Up  Future Appointments   Date Time Provider Department [facility]   6/5/2024 10:15 AM NEUROSURGERY [facility] NURSE [id number] Academic   6/25/2024 11:40 AM Clint Cheung MD [id number] Bernard WEBBER  MARISEL Gonzales

## 2024-05-22 ENCOUNTER — LAB REQUISITION (OUTPATIENT)
Dept: LAB | Facility: HOSPITAL | Age: 74
End: 2024-05-22
Payer: MEDICARE

## 2024-05-22 DIAGNOSIS — Z51.89 ENCOUNTER FOR OTHER SPECIFIED AFTERCARE: ICD-10-CM

## 2024-05-22 LAB
ALBUMIN SERPL BCP-MCNC: 3 G/DL (ref 3.4–5)
ALP SERPL-CCNC: 68 U/L (ref 33–136)
ALT SERPL W P-5'-P-CCNC: 12 U/L (ref 7–45)
ANION GAP SERPL CALC-SCNC: 15 MMOL/L (ref 10–20)
AST SERPL W P-5'-P-CCNC: 17 U/L (ref 9–39)
BASOPHILS # BLD AUTO: 0.08 X10*3/UL (ref 0–0.1)
BASOPHILS NFR BLD AUTO: 0.7 %
BILIRUB SERPL-MCNC: 0.3 MG/DL (ref 0–1.2)
BUN SERPL-MCNC: 12 MG/DL (ref 6–23)
CALCIUM SERPL-MCNC: 8.8 MG/DL (ref 8.6–10.3)
CHLORIDE SERPL-SCNC: 104 MMOL/L (ref 98–107)
CO2 SERPL-SCNC: 24 MMOL/L (ref 21–32)
CREAT SERPL-MCNC: 0.56 MG/DL (ref 0.5–1.05)
EGFRCR SERPLBLD CKD-EPI 2021: >90 ML/MIN/1.73M*2
EOSINOPHIL # BLD AUTO: 0.26 X10*3/UL (ref 0–0.4)
EOSINOPHIL NFR BLD AUTO: 2.4 %
ERYTHROCYTE [DISTWIDTH] IN BLOOD BY AUTOMATED COUNT: 15.5 % (ref 11.5–14.5)
GLUCOSE SERPL-MCNC: 90 MG/DL (ref 74–99)
HCT VFR BLD AUTO: 32.4 % (ref 36–46)
HGB BLD-MCNC: 9.9 G/DL (ref 12–16)
IMM GRANULOCYTES # BLD AUTO: 0.28 X10*3/UL (ref 0–0.5)
IMM GRANULOCYTES NFR BLD AUTO: 2.6 % (ref 0–0.9)
LYMPHOCYTES # BLD AUTO: 3.14 X10*3/UL (ref 0.8–3)
LYMPHOCYTES NFR BLD AUTO: 29.4 %
MCH RBC QN AUTO: 29.5 PG (ref 26–34)
MCHC RBC AUTO-ENTMCNC: 30.6 G/DL (ref 32–36)
MCV RBC AUTO: 96 FL (ref 80–100)
MONOCYTES # BLD AUTO: 1.22 X10*3/UL (ref 0.05–0.8)
MONOCYTES NFR BLD AUTO: 11.4 %
NEUTROPHILS # BLD AUTO: 5.69 X10*3/UL (ref 1.6–5.5)
NEUTROPHILS NFR BLD AUTO: 53.5 %
NRBC BLD-RTO: 0 /100 WBCS (ref 0–0)
PLATELET # BLD AUTO: 557 X10*3/UL (ref 150–450)
POTASSIUM SERPL-SCNC: 4.1 MMOL/L (ref 3.5–5.3)
PROT SERPL-MCNC: 5.5 G/DL (ref 6.4–8.2)
RBC # BLD AUTO: 3.36 X10*6/UL (ref 4–5.2)
SODIUM SERPL-SCNC: 139 MMOL/L (ref 136–145)
WBC # BLD AUTO: 10.7 X10*3/UL (ref 4.4–11.3)

## 2024-05-22 PROCEDURE — 85025 COMPLETE CBC W/AUTO DIFF WBC: CPT

## 2024-05-22 PROCEDURE — 80053 COMPREHEN METABOLIC PANEL: CPT

## 2024-05-28 ENCOUNTER — LAB REQUISITION (OUTPATIENT)
Dept: LAB | Facility: HOSPITAL | Age: 74
End: 2024-05-28
Payer: MEDICARE

## 2024-05-28 DIAGNOSIS — Z51.89 ENCOUNTER FOR OTHER SPECIFIED AFTERCARE: ICD-10-CM

## 2024-05-28 LAB
ANION GAP SERPL CALC-SCNC: 20 MMOL/L (ref 10–20)
BASOPHILS # BLD AUTO: 0.02 X10*3/UL (ref 0–0.1)
BASOPHILS NFR BLD AUTO: 0.2 %
BUN SERPL-MCNC: 19 MG/DL (ref 6–23)
CALCIUM SERPL-MCNC: 8.8 MG/DL (ref 8.6–10.3)
CHLORIDE SERPL-SCNC: 102 MMOL/L (ref 98–107)
CO2 SERPL-SCNC: 23 MMOL/L (ref 21–32)
CREAT SERPL-MCNC: 0.58 MG/DL (ref 0.5–1.05)
EGFRCR SERPLBLD CKD-EPI 2021: >90 ML/MIN/1.73M*2
EOSINOPHIL # BLD AUTO: 0.13 X10*3/UL (ref 0–0.4)
EOSINOPHIL NFR BLD AUTO: 1.6 %
ERYTHROCYTE [DISTWIDTH] IN BLOOD BY AUTOMATED COUNT: 15.2 % (ref 11.5–14.5)
GLUCOSE SERPL-MCNC: 74 MG/DL (ref 74–99)
HCT VFR BLD AUTO: 35.8 % (ref 36–46)
HGB BLD-MCNC: 10.7 G/DL (ref 12–16)
IMM GRANULOCYTES # BLD AUTO: 0.04 X10*3/UL (ref 0–0.5)
IMM GRANULOCYTES NFR BLD AUTO: 0.5 % (ref 0–0.9)
LYMPHOCYTES # BLD AUTO: 0.6 X10*3/UL (ref 0.8–3)
LYMPHOCYTES NFR BLD AUTO: 7.4 %
MCH RBC QN AUTO: 29.2 PG (ref 26–34)
MCHC RBC AUTO-ENTMCNC: 29.9 G/DL (ref 32–36)
MCV RBC AUTO: 98 FL (ref 80–100)
MONOCYTES # BLD AUTO: 0.37 X10*3/UL (ref 0.05–0.8)
MONOCYTES NFR BLD AUTO: 4.6 %
NEUTROPHILS # BLD AUTO: 6.92 X10*3/UL (ref 1.6–5.5)
NEUTROPHILS NFR BLD AUTO: 85.7 %
NRBC BLD-RTO: 0 /100 WBCS (ref 0–0)
PLATELET # BLD AUTO: 568 X10*3/UL (ref 150–450)
POTASSIUM SERPL-SCNC: 4.7 MMOL/L (ref 3.5–5.3)
RBC # BLD AUTO: 3.67 X10*6/UL (ref 4–5.2)
SODIUM SERPL-SCNC: 140 MMOL/L (ref 136–145)
WBC # BLD AUTO: 8.1 X10*3/UL (ref 4.4–11.3)

## 2024-05-28 PROCEDURE — 85025 COMPLETE CBC W/AUTO DIFF WBC: CPT

## 2024-05-28 PROCEDURE — 80048 BASIC METABOLIC PNL TOTAL CA: CPT

## 2024-05-29 ENCOUNTER — TELEPHONE (OUTPATIENT)
Dept: PAIN MEDICINE | Facility: CLINIC | Age: 74
End: 2024-05-29

## 2024-05-29 NOTE — TELEPHONE ENCOUNTER
GOOD AFTERNOON.  I RECEIVED A VM FROM NURSE NICKERSON FROM [facility].  PATIENT MORRIS SAUCEDO IS BEING DISCHARGED FROM [facility] ON 5/31/24, AND SHE WAS WONDERING IF YOU WOULD BE WILLING TO SIGN HOME CARE ORDERS FOR THIS PATIENT.  LIAY'S PHONE NUMBER [phone].  THANK YOU.   Abdomen soft, non-tender, no guarding.

## 2024-05-30 NOTE — TELEPHONE ENCOUNTER
GOOD AFTERNOON.  I [facility]  SHE WOULD LIKE YOU TO GIVE HER A CALL BACK [phone].  SHE WAS WONDERING IF YOU COULD GIVE HER VERBAL ORDERS BY PHONE FOR THE PATIENT TO RECEIVE PHYSICAL THERAPY FROM HOME.  SHE IS ALSO REQUESTING A PLAN OF CARE FOR THE PATIENT.  THE PATIENT WILL BE LEAVING THE FACILITY TOMORROW, AND SHE WON'T BE ABLE TO DO PHYSICAL THERAPY FROM HOME WITHOUT THE ORDER.  THANK YOU.

## 2024-06-04 ENCOUNTER — TELEPHONE (OUTPATIENT)
Dept: NEUROSURGERY | Facility: HOSPITAL | Age: 74
End: 2024-06-04
Payer: MEDICARE

## 2024-06-04 NOTE — TELEPHONE ENCOUNTER
Talked to her sister and she said that the incision looks good. No redness, swelling or drainage. Home health is coming to the house. She will see Dr. Cheung on 6/25.

## 2024-06-05 ENCOUNTER — APPOINTMENT (OUTPATIENT)
Dept: NEUROSURGERY | Facility: HOSPITAL | Age: 74
End: 2024-06-05
Payer: MEDICARE

## 2024-06-12 ENCOUNTER — APPOINTMENT (OUTPATIENT)
Dept: NEUROSURGERY | Facility: HOSPITAL | Age: 74
End: 2024-06-12
Payer: MEDICARE

## 2024-06-17 ENCOUNTER — APPOINTMENT (OUTPATIENT)
Dept: PRIMARY CARE | Facility: CLINIC | Age: 74
End: 2024-06-17
Payer: MEDICARE

## 2024-06-17 VITALS
DIASTOLIC BLOOD PRESSURE: 68 MMHG | OXYGEN SATURATION: 97 % | RESPIRATION RATE: 12 BRPM | HEART RATE: 100 BPM | SYSTOLIC BLOOD PRESSURE: 110 MMHG

## 2024-06-17 DIAGNOSIS — G89.29 CHRONIC LOW BACK PAIN WITHOUT SCIATICA, UNSPECIFIED BACK PAIN LATERALITY: ICD-10-CM

## 2024-06-17 DIAGNOSIS — D64.9 ANEMIA, UNSPECIFIED TYPE: ICD-10-CM

## 2024-06-17 DIAGNOSIS — M54.50 CHRONIC LOW BACK PAIN WITHOUT SCIATICA, UNSPECIFIED BACK PAIN LATERALITY: ICD-10-CM

## 2024-06-17 DIAGNOSIS — Z98.890 STATUS POST SPINAL SURGERY: ICD-10-CM

## 2024-06-17 DIAGNOSIS — Z98.1 S/P LUMBAR SPINAL FUSION: ICD-10-CM

## 2024-06-17 DIAGNOSIS — I10 PRIMARY HYPERTENSION: Primary | ICD-10-CM

## 2024-06-17 DIAGNOSIS — M53.2X6 LUMBAR SPINE INSTABILITY: ICD-10-CM

## 2024-06-17 DIAGNOSIS — G95.9 CERVICAL MYELOPATHY (MULTI): ICD-10-CM

## 2024-06-17 PROCEDURE — 3078F DIAST BP <80 MM HG: CPT | Performed by: INTERNAL MEDICINE

## 2024-06-17 PROCEDURE — 99203 OFFICE O/P NEW LOW 30 MIN: CPT | Performed by: INTERNAL MEDICINE

## 2024-06-17 PROCEDURE — 1111F DSCHRG MED/CURRENT MED MERGE: CPT | Performed by: INTERNAL MEDICINE

## 2024-06-17 PROCEDURE — 3074F SYST BP LT 130 MM HG: CPT | Performed by: INTERNAL MEDICINE

## 2024-06-17 NOTE — PROGRESS NOTES
The patient for first time see updated front sheet usually lives in [place] but returned Subjective   Patient ID: Michelle Jack is a 73 y.o. female who presents for No chief complaint on file..    HPI here for a second back surgery on her spine her longstanding pain and inability to walk upright has been relieved however when she was in the hospital she runs a chronic low anemia associated with her rheumatoid and other reasons she required 1 blood transfusion then went to the rehab hospital and repeatedly low blood pressures medications were alternative but still remain with higher heart rates    Pmhx lbp with myelopathy    Medication noted and updated    Allergy morphine    Prevention limited exercise blood count slightly low but kidney function acceptable albumin was low mri neck with djd    Review of Systems    Objective   There were no vitals taken for this visit.    Physical Exam  Vital signs noted alert and oriented x 3 NCAT mild pallor no icterus no jaundice no JVD chest clear to auscultation CV regular rate and rhythm S1-S2 without murmur gallop or rub extremities no clubbing cyanosis or edema normal distal pulses multiple skin tears lower extremities walking with a walker msk cervical spine limited rom LS spine nontender sp process  Assessment/Plan    impression hypertension with low blood pressure anemia cervical neck pain low back pain  Plan she did receive transfused blood at her next visit in 2 weeks we will recheck a blood count and iron studies the chronic anemia may have a higher MCV or RDW will review prior to rechecking and advised on other vitamins and follow-up with her hematologist in [place] and rheumatologist for her rheumatoid okay to discontinue the amlodipine 5 mg but continue with the lisinopril and monitor the blood pressures at home in the meantime as well as recheck the blood pressure weight and heart rate upon her return back hygiene stretches and tylenol as well as heating pad when  needed recheck one month  tt40 cc21

## 2024-06-25 ENCOUNTER — HOSPITAL ENCOUNTER (OUTPATIENT)
Dept: RADIOLOGY | Facility: CLINIC | Age: 74
Discharge: HOME | End: 2024-06-25
Payer: MEDICARE

## 2024-06-25 ENCOUNTER — OFFICE VISIT (OUTPATIENT)
Dept: NEUROSURGERY | Facility: CLINIC | Age: 74
End: 2024-06-25
Payer: MEDICARE

## 2024-06-25 VITALS
TEMPERATURE: 96.9 F | BODY MASS INDEX: 21.17 KG/M2 | HEIGHT: 64 IN | HEART RATE: 108 BPM | SYSTOLIC BLOOD PRESSURE: 108 MMHG | DIASTOLIC BLOOD PRESSURE: 78 MMHG | WEIGHT: 124 LBS

## 2024-06-25 DIAGNOSIS — M53.2X6 LUMBAR SPINE INSTABILITY: ICD-10-CM

## 2024-06-25 DIAGNOSIS — Z09 POSTOPERATIVE FOLLOW-UP: ICD-10-CM

## 2024-06-25 DIAGNOSIS — Z98.1 S/P LUMBAR SPINAL FUSION: ICD-10-CM

## 2024-06-25 DIAGNOSIS — Z98.890 STATUS POST SPINAL SURGERY: ICD-10-CM

## 2024-06-25 DIAGNOSIS — Z98.1 S/P SPINAL FUSION: Primary | ICD-10-CM

## 2024-06-25 PROCEDURE — 72082 X-RAY EXAM ENTIRE SPI 2/3 VW: CPT

## 2024-06-25 PROCEDURE — 99024 POSTOP FOLLOW-UP VISIT: CPT | Performed by: NEUROLOGICAL SURGERY

## 2024-06-25 PROCEDURE — 1126F AMNT PAIN NOTED NONE PRSNT: CPT | Performed by: NEUROLOGICAL SURGERY

## 2024-06-25 PROCEDURE — 1159F MED LIST DOCD IN RCRD: CPT | Performed by: NEUROLOGICAL SURGERY

## 2024-06-25 PROCEDURE — 1036F TOBACCO NON-USER: CPT | Performed by: NEUROLOGICAL SURGERY

## 2024-06-25 ASSESSMENT — PAIN SCALES - GENERAL: PAINLEVEL: 0-NO PAIN

## 2024-06-25 NOTE — PROGRESS NOTES
Go directly to the [facility] Emergency department in your car upon dc from here- Dr. Crews is the physician that accepted your case- do not eat or drink anything until seen at [facility]    I just had the pleasure of seeing Ms. Jack back in the Neurosurgery Spine Clinic at the [facility]. She is a very pleasant 73 -year-old female, who recently underwent a T11-S1 FUSION with me on 5/13/2024 and is status post 6 weeks out from her surgery.    Ms. Jack  is doing well from her surgery.  Overall she denies any pain in the lower extremity that was her major symptom right before the surgery.  At this point of time she is able to ambulate with a walker and can stand without support.      Alert and oriented  No apparent distress.  Motor strength baseline with no new weakness.   Sensory exam grossly intact  Gait Normal  Incision well healed.   Her clinical spinal alignment seems to within normal limits with no evidence of any sagittal imbalance or any coronal imbalance which is much improved as compared to what it was before the surgery.    AP and lateral long cassette scoliosis films shows well placed instrumentation with no evidence of any instrumentation failure.  She has mild proximal junctional kyphosis with plegic angle measuring about 23 degrees but with no evidence of any proximal junctional failure .    There are no concerning neurological issues at this point.  Will continue to follow her clinically and with radiographs at 6 months in 1 year.  I discussed with her the various things she should or should not do and the importance of upright posture and avoid forward bending especially about the top of the construct.  She can resume her preoperative medications at this point.  I given her a referral for physical therapy.  At his point of time, we will see her  back in clinic at 6 months from the time of surgery  with an AP and lateral long cassette scoliosis films.  It was a pleasure to participate in Ms. Jack  care.   All questions were answered to the patients satisfaction and she explained understanding of the further treatment plan.        Clint Cheung MD, [facility],  TILANS  Director - Minimally Invasive Spine Surgery  [facility]   of Neurological Surgery  [facility] [employer]  [place], [place]    ---Some of this note was completed using Dragon voice recognition technology and sometimes the software misinterprets words. This may include unintended errors with respect to translation of words, typographical errors or grammar errors which may not have been identified prior to finalization of the chart note. Please take this into account when reading this note---

## 2024-06-27 DIAGNOSIS — M47.816 OSTEOARTHRITIS OF LUMBAR SPINE, UNSPECIFIED SPINAL OSTEOARTHRITIS COMPLICATION STATUS: ICD-10-CM

## 2024-06-27 DIAGNOSIS — M96.0 PSEUDARTHROSIS FOLLOWING SPINAL FUSION: Primary | ICD-10-CM

## 2024-06-27 RX ORDER — CELECOXIB 100 MG/1
CAPSULE ORAL
Qty: 60 CAPSULE | Refills: 3 | Status: SHIPPED | OUTPATIENT
Start: 2024-06-27

## 2024-07-05 ENCOUNTER — APPOINTMENT (OUTPATIENT)
Dept: PRIMARY CARE | Facility: CLINIC | Age: 74
End: 2024-07-05
Payer: MEDICARE

## 2024-07-05 ENCOUNTER — LAB (OUTPATIENT)
Dept: LAB | Facility: LAB | Age: 74
End: 2024-07-05
Payer: MEDICARE

## 2024-07-05 VITALS
SYSTOLIC BLOOD PRESSURE: 123 MMHG | HEART RATE: 84 BPM | DIASTOLIC BLOOD PRESSURE: 73 MMHG | WEIGHT: 124 LBS | BODY MASS INDEX: 21.28 KG/M2 | RESPIRATION RATE: 12 BRPM

## 2024-07-05 DIAGNOSIS — I10 PRIMARY HYPERTENSION: ICD-10-CM

## 2024-07-05 DIAGNOSIS — D64.9 ANEMIA, UNSPECIFIED TYPE: Primary | ICD-10-CM

## 2024-07-05 DIAGNOSIS — D64.9 ANEMIA, UNSPECIFIED TYPE: ICD-10-CM

## 2024-07-05 DIAGNOSIS — D50.8 OTHER IRON DEFICIENCY ANEMIA: ICD-10-CM

## 2024-07-05 LAB
ERYTHROCYTE [DISTWIDTH] IN BLOOD BY AUTOMATED COUNT: 15.6 % (ref 11.5–14.5)
HCT VFR BLD AUTO: 41.6 % (ref 36–46)
HGB BLD-MCNC: 12.4 G/DL (ref 12–16)
IRON SATN MFR SERPL: 21 % (ref 25–45)
IRON SERPL-MCNC: 98 UG/DL (ref 35–150)
MCH RBC QN AUTO: 28.2 PG (ref 26–34)
MCHC RBC AUTO-ENTMCNC: 29.8 G/DL (ref 32–36)
MCV RBC AUTO: 95 FL (ref 80–100)
NRBC BLD-RTO: 0 /100 WBCS (ref 0–0)
PLATELET # BLD AUTO: 367 X10*3/UL (ref 150–450)
RBC # BLD AUTO: 4.39 X10*6/UL (ref 4–5.2)
TIBC SERPL-MCNC: 477 UG/DL (ref 240–445)
UIBC SERPL-MCNC: 379 UG/DL (ref 110–370)
WBC # BLD AUTO: 11.5 X10*3/UL (ref 4.4–11.3)

## 2024-07-05 PROCEDURE — 83540 ASSAY OF IRON: CPT

## 2024-07-05 PROCEDURE — 99213 OFFICE O/P EST LOW 20 MIN: CPT | Performed by: INTERNAL MEDICINE

## 2024-07-05 PROCEDURE — 3074F SYST BP LT 130 MM HG: CPT | Performed by: INTERNAL MEDICINE

## 2024-07-05 PROCEDURE — 83550 IRON BINDING TEST: CPT

## 2024-07-05 PROCEDURE — 85027 COMPLETE CBC AUTOMATED: CPT

## 2024-07-05 PROCEDURE — 3078F DIAST BP <80 MM HG: CPT | Performed by: INTERNAL MEDICINE

## 2024-07-05 PROCEDURE — 36415 COLL VENOUS BLD VENIPUNCTURE: CPT

## 2024-07-05 NOTE — PROGRESS NOTES
Subjective   Patient ID: Michelle Jack is a 73 y.o. female who presents for No chief complaint on file..    HPI follow-up visit no chest pain no shortness of breath doing so much better she thinks has been eating well Home blood pressures have been between 110 and 130 systolic with her PT or other measurements of home blood pressure and home pulse readings sometimes the pulse is higher after she has been working out with the therapist she has been off the amlodipine no falls    Review of Systems    Objective   There were no vitals taken for this visit.    Physical Exam vital signs noted alert and oriented x 3 NCAT no JVD or bruit chest clear to auscultation CV regular rate and rhythm S1-S2 without murmur gallop or rub extremities no clubbing cyanosis or edema trace nonpitting edema legs are healing intact distal pulses walking with walker but almost independent    Assessment/Plan    impression hypertension?  Iron deficiency anemia other diagnoses  Plan continue with the bp medication encouraging good diet check CBC and iron studies advised on iron and supplementation as well as nutrition and medication good hydration care and safety in the home complete with therapy she has been given specific recommendations on her spine to the surgeon and is preparing to go back to [place] by mid August as independent living and well follow-up prior to her departure

## 2024-07-19 ENCOUNTER — TELEPHONE (OUTPATIENT)
Dept: PRIMARY CARE | Facility: CLINIC | Age: 74
End: 2024-07-19

## 2024-07-22 DIAGNOSIS — I10 PRIMARY HYPERTENSION: Primary | ICD-10-CM

## 2024-07-22 RX ORDER — METOPROLOL TARTRATE 25 MG/1
12.5 TABLET, FILM COATED ORAL 2 TIMES DAILY
Qty: 30 TABLET | Refills: 1 | Status: SHIPPED | OUTPATIENT
Start: 2024-07-22 | End: 2024-09-20

## 2024-07-24 ENCOUNTER — TELEPHONE (OUTPATIENT)
Dept: PRIMARY CARE | Facility: CLINIC | Age: 74
End: 2024-07-24

## 2024-08-05 ENCOUNTER — APPOINTMENT (OUTPATIENT)
Dept: PRIMARY CARE | Facility: CLINIC | Age: 74
End: 2024-08-05
Payer: MEDICARE

## 2024-08-05 VITALS — SYSTOLIC BLOOD PRESSURE: 123 MMHG | DIASTOLIC BLOOD PRESSURE: 67 MMHG | HEART RATE: 110 BPM | RESPIRATION RATE: 12 BRPM

## 2024-08-05 DIAGNOSIS — D64.9 ANEMIA, UNSPECIFIED TYPE: Primary | ICD-10-CM

## 2024-08-05 DIAGNOSIS — I10 PRIMARY HYPERTENSION: ICD-10-CM

## 2024-08-05 DIAGNOSIS — R00.0 SINUS TACHYCARDIA: ICD-10-CM

## 2024-08-05 PROCEDURE — 99213 OFFICE O/P EST LOW 20 MIN: CPT | Performed by: INTERNAL MEDICINE

## 2024-08-05 PROCEDURE — 3078F DIAST BP <80 MM HG: CPT | Performed by: INTERNAL MEDICINE

## 2024-08-05 PROCEDURE — 3074F SYST BP LT 130 MM HG: CPT | Performed by: INTERNAL MEDICINE

## 2024-08-05 NOTE — PROGRESS NOTES
Subjective   Patient ID: Geovanna Jack is a 73 y.o. female who presents for No chief complaint on file..    HPI follow-up visit no chest pain no shortness of breath no palpitations she has difficulty telling when she is even in a faster heart rate by all appearing purposes appears to have been sinus tachycardia per previous EKG not accelerated junctional rhythm the metoprolol does seem to help her home heart rates have been as low as the 70s and her blood pressures have been commensurately normal discussed with nutrition and hydration as she is moving back to [place] in 2 days her previous blood work was reviewed    Review of Systems    Objective   There were no vitals taken for this visit.    Physical Exam vital signs noted alert and oriented x 3 NCAT no pallor no JVD chest clear to auscultation CV regular rate and rhythm S1-S2 with heart rate between 90 and 110 but regular S1-S2 without murmur gallop or rub extremities no clubbing cyanosis or edema normal distal pulses    Assessment/Plan    impression anemia resolved hypertension sinus tachycardia  Plan continue with the metoprolol may take 1/2 tablet metoprolol extra if heart rate greater than 150 if either symptomatic or by checking pulse by several different methods continue with good nutrition regular hydration care and safety in the home avoidance of falls she has an appointment with her regular physician when she returns to [place] and cardiology follow-up may occur after that reviewed her prior findings here and may recheck at any time

## 2024-11-19 ENCOUNTER — LAB OUTSIDE AN ENCOUNTER (OUTPATIENT)
Dept: URBAN - METROPOLITAN AREA CLINIC 63 | Facility: CLINIC | Age: 74
End: 2024-11-19

## 2024-11-19 ENCOUNTER — OFFICE VISIT (OUTPATIENT)
Dept: URBAN - METROPOLITAN AREA CLINIC 63 | Facility: CLINIC | Age: 74
End: 2024-11-19
Payer: MEDICARE

## 2024-11-19 ENCOUNTER — DASHBOARD ENCOUNTERS (OUTPATIENT)
Age: 74
End: 2024-11-19

## 2024-11-19 VITALS
WEIGHT: 128.4 LBS | HEIGHT: 64 IN | SYSTOLIC BLOOD PRESSURE: 148 MMHG | OXYGEN SATURATION: 96 % | HEART RATE: 92 BPM | BODY MASS INDEX: 21.92 KG/M2 | DIASTOLIC BLOOD PRESSURE: 91 MMHG | TEMPERATURE: 97.5 F

## 2024-11-19 DIAGNOSIS — K21.00 GASTROESOPHAGEAL REFLUX DISEASE WITH ESOPHAGITIS WITHOUT HEMORRHAGE: ICD-10-CM

## 2024-11-19 DIAGNOSIS — K51.90 ULCERATIVE COLITIS, UNSPECIFIED: ICD-10-CM

## 2024-11-19 PROBLEM — 64766004: Status: ACTIVE | Noted: 2024-11-19

## 2024-11-19 PROBLEM — 266433003: Status: ACTIVE | Noted: 2024-11-19

## 2024-11-19 PROCEDURE — 99204 OFFICE O/P NEW MOD 45 MIN: CPT

## 2024-11-19 RX ORDER — OMEPRAZOLE 20 MG/1
1 CAPSULE 1/2 TO 1 HOUR BEFORE MORNING MEAL CAPSULE, DELAYED RELEASE ORAL TWICE A DAY
Status: ACTIVE | COMMUNITY

## 2024-11-19 RX ORDER — METOPROLOL 25 MG/1
TABLET ORAL
Qty: 90 TABLET | Status: ACTIVE | COMMUNITY

## 2024-11-19 RX ORDER — MISOPROSTOL 200 UG/1
TABLET ORAL
Qty: 60 TABLET | Status: ACTIVE | COMMUNITY

## 2024-11-19 RX ORDER — ABATACEPT 125 MG/ML
INJECTION, SOLUTION SUBCUTANEOUS
Qty: 4 MILLILITER | Status: ACTIVE | COMMUNITY

## 2024-11-19 RX ORDER — CELECOXIB 200 MG/1
CAPSULE ORAL
Qty: 180 CAPSULE | Status: ACTIVE | COMMUNITY

## 2024-11-19 RX ORDER — MISOPROSTOL 200 UG/1
1 TABLET TABLET ORAL TWICE A DAY
Qty: 180 TABLET | Refills: 3

## 2024-11-19 RX ORDER — SIMVASTATIN 40 MG/1
TABLET, FILM COATED ORAL
Qty: 90 TABLET | Status: ACTIVE | COMMUNITY

## 2024-11-19 RX ORDER — LORAZEPAM 1 MG/1
TABLET ORAL
Qty: 90 TABLET | Status: ACTIVE | COMMUNITY

## 2024-11-19 RX ORDER — OMEPRAZOLE 20 MG/1
1 CAPSULE 1/2 TO 1 HOUR BEFORE MORNING MEAL CAPSULE, DELAYED RELEASE ORAL TWICE A DAY
Qty: 180 | Refills: 3

## 2024-11-19 RX ORDER — BUDESONIDE 3 MG/1
CAPSULE, GELATIN COATED ORAL
Qty: 270 CAPSULE | Status: ACTIVE | COMMUNITY

## 2024-11-19 RX ORDER — FENOFIBRATE 48 MG/1
TABLET ORAL
Qty: 90 TABLET | Status: ACTIVE | COMMUNITY

## 2024-11-19 RX ORDER — VENLAFAXINE HYDROCHLORIDE 75 MG/1
TAKE 1 CAPSULE BY MOUTH ONCE A DAY FOR MOOD CAPSULE, EXTENDED RELEASE ORAL
Qty: 90 EACH | Refills: 0 | Status: ACTIVE | COMMUNITY

## 2024-11-19 NOTE — HPI-TODAY'S VISIT:
Patient is a 74-year-old female with a past medical history of ulcerative colitis currently on budesonide who presents today to establish care.  Patient was diagnosed with ulcerative colitis in 2014 after having severe diarrhea symptoms.  She is currently on budesonide which works well for her.  She also has a history of RA and is on Orencia which she believes also helps with her ulcerative colitis symptoms.  She is currently having about 2 bowel movements a day of semiformed stool without rectal bleeding.  Occasionally she may have some mild abdominal cramping however it is not troublesome for her.  She denies a family history of IBD.  Her last colonoscopy was in 2018 and did not show any active disease.  She has never been hospitalized or required surgical intervention for ulcerative colitis and the only time she was on steroids was for RA. She has not been on any biologic medication for ulcerative colitis specifically. Patient also tells me that she has a history of GERD and gastric ulcers.  Her last EGD was in 2021 and it did not reveal any gastric ulcers however she did have esophagitis, gastritis and duodenitis.  She is currently on omeprazole 20 mg twice daily with good acid reflux control.  She is currently due for repeat endoscopy procedures.  Patient denies fever, dysphagia, acid reflux, change in appetite, abdominal pain, nausea, vomiting, diarrhea, constipation, hematemesis, hematochezia, melena, change in stool frequency/caliber, unintentional weight loss/gain. She also denies issues with anesthesia, history of stroke, heart attack, pacemaker, defibrillator, stents, blood thinners, COPD, asthma, TR, chronic kidney disease and seizures. . Colonoscopy 7/13/2018 with  - Normal terminal ileum - Normal to mild nonspecific colitis s/p proximal ascending, mid transverse and sigmoid biopsies - Colon polyp s/p descending colon polypectomy - Pathology: Ascending colon biopsy-colonic mucosa with no significant histopathological alterations; transverse colon biopsy-colonic mucosa with no significant histopathologic alteration; sigmoid colon polyp-hyperplastic polyp; sigmoid colon biopsy-colonic mucosa with no significant histopathological alteration . EGD 5/10/2021 with  - Mild chronic laryngopharyngitis - Mild nonbleeding reflux esophagitis - Moderate diffuse bile gastritis with bile and gastric lumen - Nonbleeding duodenitis - Pathology: Duodenum biopsy-proximal duodenal mucosa with no significant histopathological alteration, no features of celiac disease, negative for intestinal parasites, fungus and Whipple's disease; stomach antrum biopsy-fundic type gastric mucosa with mild chronic gastritis, no H. pylori or neoplasia identified; distal esophagus biopsy-squamous mucosa with chronic inflammation, no Kollmar mucosa present, no intestinal metaplasia or fungal organisms . Labs 11/14/2024 - CMP within normal limits - CRP and ESR within normal limits

## 2024-11-19 NOTE — PHYSICAL EXAM GASTROINTESTINAL
Abdomen , soft, nontender, nondistended , no guarding or rigidity , no masses palpable , normal bowel sounds , Liver and Spleen,  no hepatosplenomegaly , liver nontender Single delivery by  section

## 2024-11-20 ENCOUNTER — ERX REFILL RESPONSE (OUTPATIENT)
Dept: URBAN - METROPOLITAN AREA CLINIC 63 | Facility: CLINIC | Age: 74
End: 2024-11-20

## 2024-11-20 RX ORDER — OMEPRAZOLE 20 MG/1
TAKE 1 CAPSULE BY MOUTH EVERY MORNING THIRTY TO SIXTY MINUTES PRIOR TO BREAKFAST. TWICE DAILY CAPSULE, DELAYED RELEASE ORAL
Qty: 180 CAPSULE | Refills: 3 | OUTPATIENT

## 2024-11-20 RX ORDER — OMEPRAZOLE 20 MG/1
1 CAPSULE 1/2 TO 1 HOUR BEFORE MORNING MEAL CAPSULE, DELAYED RELEASE ORAL TWICE A DAY
Qty: 180 | Refills: 3 | OUTPATIENT

## 2024-11-20 RX ORDER — OMEPRAZOLE 20 MG/1
1 CAPSULE CAPSULE, DELAYED RELEASE ORAL TWICE A DAY
Qty: 180 CAPSULE | Refills: 3 | OUTPATIENT

## 2024-12-23 DIAGNOSIS — Z98.1 S/P LUMBAR SPINAL FUSION: ICD-10-CM

## 2024-12-23 DIAGNOSIS — Z98.1 S/P SPINAL FUSION: ICD-10-CM

## 2025-01-07 ENCOUNTER — OFFICE VISIT (OUTPATIENT)
Dept: NEUROSURGERY | Facility: CLINIC | Age: 75
End: 2025-01-07
Payer: MEDICARE

## 2025-01-07 ENCOUNTER — HOSPITAL ENCOUNTER (OUTPATIENT)
Dept: RADIOLOGY | Facility: CLINIC | Age: 75
Discharge: HOME | End: 2025-01-07
Payer: MEDICARE

## 2025-01-07 VITALS
BODY MASS INDEX: 21.17 KG/M2 | HEART RATE: 86 BPM | DIASTOLIC BLOOD PRESSURE: 94 MMHG | HEIGHT: 64 IN | WEIGHT: 124 LBS | SYSTOLIC BLOOD PRESSURE: 144 MMHG | RESPIRATION RATE: 18 BRPM

## 2025-01-07 DIAGNOSIS — Z98.1 S/P SPINAL FUSION: ICD-10-CM

## 2025-01-07 DIAGNOSIS — Z98.1 S/P LUMBAR SPINAL FUSION: Primary | ICD-10-CM

## 2025-01-07 DIAGNOSIS — Z98.1 S/P LUMBAR SPINAL FUSION: ICD-10-CM

## 2025-01-07 DIAGNOSIS — Z98.1 S/P FUSION OF THORACIC SPINE: ICD-10-CM

## 2025-01-07 PROCEDURE — 99214 OFFICE O/P EST MOD 30 MIN: CPT | Performed by: NEUROLOGICAL SURGERY

## 2025-01-07 PROCEDURE — 3008F BODY MASS INDEX DOCD: CPT | Performed by: NEUROLOGICAL SURGERY

## 2025-01-07 PROCEDURE — 72082 X-RAY EXAM ENTIRE SPI 2/3 VW: CPT

## 2025-01-07 PROCEDURE — 99214 OFFICE O/P EST MOD 30 MIN: CPT | Mod: GC | Performed by: NEUROLOGICAL SURGERY

## 2025-01-07 PROCEDURE — 1036F TOBACCO NON-USER: CPT | Performed by: NEUROLOGICAL SURGERY

## 2025-01-07 PROCEDURE — 1126F AMNT PAIN NOTED NONE PRSNT: CPT | Performed by: NEUROLOGICAL SURGERY

## 2025-01-07 ASSESSMENT — PAIN SCALES - GENERAL: PAINLEVEL_OUTOF10: 0-NO PAIN

## 2025-01-07 NOTE — PROGRESS NOTES
I just had the pleasure of seeing Ms. Jack back in the Neurosurgery Spine Clinic at the [facility]. She is a very pleasant 74 -year-old female, who recently underwent a T11-S1 FUSION with me on 5/13/2024 and is almost 8 months out from her surgery.     Ms. Jack  is overall doing well from her surgery.  Overall she denies any pain in the lower extremity that was her major symptom right before the surgery.  At this point of time she is able to ambulate with a walker and can stand without support.  Able to travel and take care of herself and manage her house so that she absolutely could not do before surgery whatsoever.  Overall she is very pleased with the results of the surgery given the fact that she was very symptomatic before the screw she having pain and difficulty walking to the point that she was essentially homebound.      Alert and oriented  No apparent distress.  Motor strength baseline with no new weakness.   Sensory exam grossly intact  Gait Normal  Incision well healed.   Her clinical spinal alignment seems to within normal limits with no evidence of any sagittal imbalance or any coronal imbalance which is much improved as compared to what it was before the surgery.     AP and lateral long cassette scoliosis films shows well placed instrumentation with no evidence of any instrumentation failure.  She has mild proximal junctional kyphosis with plegic angle measuring about 25 degrees but with no evidence of any proximal junctional failure .  The x-ray seems stable as compared to the previous x-rays with no evidence of any worsening whatsoever.     There are no concerning neurological issues at this point.  Will continue to follow her clinically and with radiographs at 6 months in 1 year.  I discussed with her the various things she should or should not do and the importance of upright posture and avoid forward bending especially about the top of the construct.  She can resume her  preoperative medications at this point.  I given her a referral for physical therapy.  At his point of time, we will see her  back in clinic at 1 year from the time of surgery  with an AP and lateral long cassette scoliosis films.  It was a pleasure to participate in Ms. Jack  care.   All questions were answered to the patients satisfaction and she explained understanding of the further treatment plan.           Clint Cheung MD, [facility], FAANS  Director - Minimally Invasive Spine Surgery  [facility]   of Neurological Surgery  [employer] [facility] [employer]  [place], [place]     ---Some of this note was completed using Dragon voice recognition technology and sometimes the software misinterprets words. This may include unintended errors with respect to translation of words, typographical errors or grammar errors which may not have been identified prior to finalization of the chart note. Please take this into account when reading this note---

## 2025-02-21 ENCOUNTER — TELEPHONE ENCOUNTER (OUTPATIENT)
Dept: URBAN - METROPOLITAN AREA CLINIC 64 | Facility: CLINIC | Age: 75
End: 2025-02-21

## 2025-02-21 RX ORDER — ONDANSETRON HYDROCHLORIDE 4 MG/1
1 TABLET TABLET, FILM COATED ORAL
Qty: 2 | Refills: 0 | OUTPATIENT
Start: 2025-02-21

## 2025-02-21 RX ORDER — SODIUM, POTASSIUM,MAG SULFATES 17.5-3.13G
AS DIRECTED SOLUTION, RECONSTITUTED, ORAL ORAL
Qty: 1 | Refills: 0 | OUTPATIENT
Start: 2025-02-21 | End: 2025-02-23

## 2025-02-24 ENCOUNTER — TELEPHONE ENCOUNTER (OUTPATIENT)
Dept: URBAN - METROPOLITAN AREA CLINIC 63 | Facility: CLINIC | Age: 75
End: 2025-02-24

## 2025-02-27 ENCOUNTER — WEB ENCOUNTER (OUTPATIENT)
Dept: URBAN - METROPOLITAN AREA CLINIC 63 | Facility: CLINIC | Age: 75
End: 2025-02-27

## 2025-03-04 ENCOUNTER — CLAIMS CREATED FROM THE CLAIM WINDOW (OUTPATIENT)
Dept: URBAN - METROPOLITAN AREA SURGERY CENTER 4 | Facility: SURGERY CENTER | Age: 75
End: 2025-03-04
Payer: MEDICARE

## 2025-03-04 ENCOUNTER — CLAIMS CREATED FROM THE CLAIM WINDOW (OUTPATIENT)
Dept: URBAN - METROPOLITAN AREA CLINIC 4 | Facility: CLINIC | Age: 75
End: 2025-03-04
Payer: MEDICARE

## 2025-03-04 DIAGNOSIS — K21.9 GASTRO-ESOPHAGEAL REFLUX DISEASE WITHOUT ESOPHAGITIS: ICD-10-CM

## 2025-03-04 DIAGNOSIS — Z86.0100 PERSONAL HISTORY OF COLON POLYPS, UNSPECIFIED: ICD-10-CM

## 2025-03-04 DIAGNOSIS — K44.9 DIAPHRAGMATIC HERNIA WITHOUT OBSTRUCTION OR GANGRENE: ICD-10-CM

## 2025-03-04 DIAGNOSIS — K63.5 COLON POLYP: ICD-10-CM

## 2025-03-04 DIAGNOSIS — K63.5 POLYP OF COLON: ICD-10-CM

## 2025-03-04 DIAGNOSIS — K64.0 FIRST DEGREE HEMORRHOIDS: ICD-10-CM

## 2025-03-04 DIAGNOSIS — K90.0 CELIAC DISEASE: ICD-10-CM

## 2025-03-04 DIAGNOSIS — Z86.0100 PERSONAL HISTORY OF COLONIC POLYPS: ICD-10-CM

## 2025-03-04 DIAGNOSIS — K29.70 GASTRITIS, UNSPECIFIED, WITHOUT BLEEDING: ICD-10-CM

## 2025-03-04 DIAGNOSIS — K31.7 GASTRIC POLYP: ICD-10-CM

## 2025-03-04 DIAGNOSIS — K31.7 POLYP OF STOMACH AND DUODENUM: ICD-10-CM

## 2025-03-04 DIAGNOSIS — K63.89 OTHER SPECIFIED DISEASES OF INTESTINE: ICD-10-CM

## 2025-03-04 DIAGNOSIS — Z86.0100 HISTORY OF COLON POLYPS: ICD-10-CM

## 2025-03-04 PROCEDURE — 88312 SPECIAL STAINS GROUP 1: CPT | Performed by: PATHOLOGY

## 2025-03-04 PROCEDURE — 43239 EGD BIOPSY SINGLE/MULTIPLE: CPT | Performed by: CLINIC/CENTER

## 2025-03-04 PROCEDURE — 88305 TISSUE EXAM BY PATHOLOGIST: CPT | Performed by: PATHOLOGY

## 2025-03-04 PROCEDURE — 00813 ANES UPR LWR GI NDSC PX: CPT | Performed by: NURSE ANESTHETIST, CERTIFIED REGISTERED

## 2025-03-04 PROCEDURE — 45380 COLONOSCOPY AND BIOPSY: CPT | Performed by: INTERNAL MEDICINE

## 2025-03-04 PROCEDURE — 45380 COLONOSCOPY AND BIOPSY: CPT | Performed by: CLINIC/CENTER

## 2025-03-04 PROCEDURE — 45385 COLONOSCOPY W/LESION REMOVAL: CPT | Performed by: INTERNAL MEDICINE

## 2025-03-04 PROCEDURE — 88342 IMHCHEM/IMCYTCHM 1ST ANTB: CPT | Performed by: PATHOLOGY

## 2025-03-04 PROCEDURE — 45385 COLONOSCOPY W/LESION REMOVAL: CPT | Performed by: CLINIC/CENTER

## 2025-03-04 PROCEDURE — 43239 EGD BIOPSY SINGLE/MULTIPLE: CPT | Performed by: INTERNAL MEDICINE

## 2025-03-04 RX ORDER — ONDANSETRON HYDROCHLORIDE 4 MG/1
1 TABLET TABLET, FILM COATED ORAL
Qty: 2 | Refills: 0 | Status: ACTIVE | COMMUNITY
Start: 2025-02-21

## 2025-03-04 RX ORDER — BUDESONIDE 3 MG/1
CAPSULE, GELATIN COATED ORAL
Qty: 270 CAPSULE | Status: ACTIVE | COMMUNITY

## 2025-03-04 RX ORDER — FENOFIBRATE 48 MG/1
TABLET ORAL
Qty: 90 TABLET | Status: ACTIVE | COMMUNITY

## 2025-03-04 RX ORDER — LORAZEPAM 1 MG/1
TABLET ORAL
Qty: 90 TABLET | Status: ACTIVE | COMMUNITY

## 2025-03-04 RX ORDER — VENLAFAXINE HYDROCHLORIDE 75 MG/1
TAKE 1 CAPSULE BY MOUTH ONCE A DAY FOR MOOD CAPSULE, EXTENDED RELEASE ORAL
Qty: 90 EACH | Refills: 0 | Status: ACTIVE | COMMUNITY

## 2025-03-04 RX ORDER — SIMVASTATIN 40 MG/1
TABLET, FILM COATED ORAL
Qty: 90 TABLET | Status: ACTIVE | COMMUNITY

## 2025-03-04 RX ORDER — ABATACEPT 125 MG/ML
INJECTION, SOLUTION SUBCUTANEOUS
Qty: 4 MILLILITER | Status: ACTIVE | COMMUNITY

## 2025-03-04 RX ORDER — METOPROLOL 25 MG/1
TABLET ORAL
Qty: 90 TABLET | Status: ACTIVE | COMMUNITY

## 2025-03-04 RX ORDER — CELECOXIB 200 MG/1
CAPSULE ORAL
Qty: 180 CAPSULE | Status: ACTIVE | COMMUNITY

## 2025-03-04 RX ORDER — MISOPROSTOL 200 UG/1
1 TABLET TABLET ORAL TWICE A DAY
Qty: 180 TABLET | Refills: 3 | Status: ACTIVE | COMMUNITY

## 2025-03-04 RX ORDER — OMEPRAZOLE 20 MG/1
1 CAPSULE CAPSULE, DELAYED RELEASE ORAL TWICE A DAY
Qty: 180 CAPSULE | Refills: 3 | Status: ACTIVE | COMMUNITY

## 2025-03-16 PROBLEM — 266435005: Status: ACTIVE | Noted: 2025-03-16

## 2025-03-18 ENCOUNTER — OFFICE VISIT (OUTPATIENT)
Dept: URBAN - METROPOLITAN AREA CLINIC 63 | Facility: CLINIC | Age: 75
End: 2025-03-18
Payer: MEDICARE

## 2025-03-18 VITALS
OXYGEN SATURATION: 96 % | HEART RATE: 81 BPM | BODY MASS INDEX: 22.36 KG/M2 | HEIGHT: 64 IN | TEMPERATURE: 98.5 F | DIASTOLIC BLOOD PRESSURE: 90 MMHG | SYSTOLIC BLOOD PRESSURE: 140 MMHG | WEIGHT: 131 LBS

## 2025-03-18 DIAGNOSIS — K21.9 GERD WITHOUT ESOPHAGITIS: ICD-10-CM

## 2025-03-18 DIAGNOSIS — K51.90 ACUTE ULCERATIVE COLITIS: ICD-10-CM

## 2025-03-18 DIAGNOSIS — R19.8 ABNORMAL FINDINGS ON ESOPHAGOGASTRODUODENOSCOPY (EGD): ICD-10-CM

## 2025-03-18 PROCEDURE — 99214 OFFICE O/P EST MOD 30 MIN: CPT

## 2025-03-18 RX ORDER — FENOFIBRATE 48 MG/1
TABLET ORAL
Qty: 90 TABLET | Status: ACTIVE | COMMUNITY

## 2025-03-18 RX ORDER — BUDESONIDE 3 MG/1
2 CAPSULES CAPSULE, GELATIN COATED ORAL ONCE A DAY
Qty: 180 CAPSULE | Refills: 3

## 2025-03-18 RX ORDER — MISOPROSTOL 200 UG/1
1 TABLET TABLET ORAL TWICE A DAY
Qty: 180 TABLET | Refills: 3 | Status: ACTIVE | COMMUNITY

## 2025-03-18 RX ORDER — SIMVASTATIN 40 MG/1
TABLET, FILM COATED ORAL
Qty: 90 TABLET | Status: ACTIVE | COMMUNITY

## 2025-03-18 RX ORDER — ABATACEPT 125 MG/ML
INJECTION, SOLUTION SUBCUTANEOUS
Qty: 4 MILLILITER | Status: ACTIVE | COMMUNITY

## 2025-03-18 RX ORDER — METOPROLOL 25 MG/1
TABLET ORAL
Qty: 90 TABLET | Status: ACTIVE | COMMUNITY

## 2025-03-18 RX ORDER — BUDESONIDE 3 MG/1
CAPSULE, GELATIN COATED ORAL
Qty: 270 CAPSULE | Status: ACTIVE | COMMUNITY

## 2025-03-18 RX ORDER — CELECOXIB 200 MG/1
CAPSULE ORAL
Qty: 180 CAPSULE | Status: ACTIVE | COMMUNITY

## 2025-03-18 RX ORDER — LORAZEPAM 1 MG/1
TABLET ORAL
Qty: 90 TABLET | Status: ACTIVE | COMMUNITY

## 2025-03-18 RX ORDER — VENLAFAXINE HYDROCHLORIDE 75 MG/1
TAKE 1 CAPSULE BY MOUTH ONCE A DAY FOR MOOD CAPSULE, EXTENDED RELEASE ORAL
Qty: 90 EACH | Refills: 0 | Status: ACTIVE | COMMUNITY

## 2025-03-18 RX ORDER — OMEPRAZOLE 20 MG/1
1 CAPSULE CAPSULE, DELAYED RELEASE ORAL TWICE A DAY
Qty: 180 CAPSULE | Refills: 3 | Status: ACTIVE | COMMUNITY

## 2025-03-18 NOTE — PHYSICAL EXAM EYES:
Conjuntivae and eyelids appear normal, Sclerae : White without injection Problem: Falls - Risk of:  Goal: Will remain free from falls  Description  Will remain free from falls  6/14/2019 1733 by Boone Avila RN  Outcome: Met This Shift    Problem: Falls - Risk of:  Goal: Absence of physical injury  Description  Absence of physical injury  6/14/2019 1733 by Boone Avila RN  Outcome: Met This Shift

## (undated) DEVICE — CLIP, LIGATING, HORIZON, MEDIUM, TITANIUM

## (undated) DEVICE — SPONGE, DISSECTOR, PEANUT, 3/8, STERILE 5 FOAM HOLDER"

## (undated) DEVICE — DRAIN, WOUND, ROUND, W/TROCAR, HOLE PATTERN, 10 IN, MEDIUM, 1/8 X 49 IN

## (undated) DEVICE — Device

## (undated) DEVICE — TIP, SUCTION, YANKAUER, FLEXIBLE

## (undated) DEVICE — SUTURE, PROLENE, 2-0, 36 IN, MH, BLUE

## (undated) DEVICE — SUTURE, PROLENE, 6-0, 30 IN, RB-2, BLUE

## (undated) DEVICE — TUBING, SMOKE EVAC, 3/8 X 10 FT

## (undated) DEVICE — TAPE, UMBILICAL, 1/8 X 30 IN, MULTIPACK, COTTON, WHITE

## (undated) DEVICE — SUTURE, VICRYL, 0, 18 IN, CT-1, UNDYED

## (undated) DEVICE — SUTURE, POLYSORB, 2-0, 18 IN, GS23, DETACHABLE, MULTIPACK, UNDYED

## (undated) DEVICE — LOOP, VESSEL, MAXI, RED

## (undated) DEVICE — SUTURE, MONOCRYL, 4-0, 18 IN, PS2, UNDYED

## (undated) DEVICE — SUTURE, SILK, 3-0, 30 IN, MULTIPACK, BLACK

## (undated) DEVICE — SUTURE, SILK, 4-0, 18 IN, LABYRINTH, BLACK

## (undated) DEVICE — SUTURE, SILK, 2-0, 30 IN, SH, BLACK

## (undated) DEVICE — SEALER, BIPOLAR, AQUA MANTYS 6.0

## (undated) DEVICE — SUTURE, VICRYL, 3-0, 27 IN, SH

## (undated) DEVICE — SKIN CLOSURE SYS, PREMIERPRO EXOFIN, 1-4CM X 22CM, 1.75G TUBE

## (undated) DEVICE — DRAPE, INSTRUMENT, W/POUCH, STERI DRAPE, 7 X 11 IN, DISPOSABLE, STERILE

## (undated) DEVICE — STAPLER, SKIN PROXIMATE, 35 WIDE

## (undated) DEVICE — BONE, MILL, MIDAS REX, DUAL BLADE, ELECTRIC

## (undated) DEVICE — CLIP, LIGATING, HORIZON, LARGE, TITANIUM

## (undated) DEVICE — DRESSING, MEPILEX, POST OP, 8 X 4

## (undated) DEVICE — SEALANT, HEMOSTATIC, FLOSEAL, 10 ML

## (undated) DEVICE — DRESSING, MEPILEX, BORDER FLEX, 3 X 3

## (undated) DEVICE — SUTURE, SILK, 3-0, 18 IN, MULTIPACK, BLACK

## (undated) DEVICE — SPONGE, HEMOSTATIC, GELATIN, SURGIFOAM, 8 X 12.5 CM X 10 MM

## (undated) DEVICE — SPHERE, STEALTHSTATION, 5-PK

## (undated) DEVICE — LOOP, VESSEL, MAXI, BLUE

## (undated) DEVICE — COVER PROBE, SOFT FLEX W/ GEL, 5 X 48 IN (13X122CM)

## (undated) DEVICE — GOWN, ASTOUND, XL

## (undated) DEVICE — ELECTRODE, ELECTROSURGICAL, BLADE, INSULATED, ENT/IMA, STERILE

## (undated) DEVICE — MANIFOLD, 4 PORT NEPTUNE STANDARD

## (undated) DEVICE — CAUTERY, PENCIL, PUSH BUTTON, SMOKE EVAC, 70MM

## (undated) DEVICE — TAPE, SILK, DURAPORE, 3 IN X 10 YD, LF

## (undated) DEVICE — SUTURE, SILK, 0, 24 IN, SUTUPAK, BLACK

## (undated) DEVICE — BOOT, SUTURE-AID, YELLOW, STERILE, LF

## (undated) DEVICE — CATHETER TRAY, SURESTEP, 16FR, URINE METER W/STATLOCK

## (undated) DEVICE — EVACUATOR, WOUND, CLOSED, 3 SPRING, 400 CC, Y CONNECTING TUBE

## (undated) DEVICE — SUTURE, SILK, 2-0, TIES, 12-30 IN, BLACK

## (undated) DEVICE — NEEDLE, ELECTRODE, SUBDERMAL,SINGLE, 200CM LEAD, DISP

## (undated) DEVICE — ELECTRODE, ELECTROSURGICAL, BLADE, EXTENDED, 6.5 IN, STAINLESS STEEL

## (undated) DEVICE — NEEDLE, ELECTRODE, SUBDERMAL, PAIRED, 2.0 LEAD, DISP

## (undated) DEVICE — PAD, GROUNDING, ELECTROSURGICAL, W/9 FT CABLE, POLYHESIVE II, ADULT, LF

## (undated) DEVICE — SYRINGE, 20 CC, LUER LOCK, MONOJECT, W/O CAP, LF

## (undated) DEVICE — DRAIN, WOUND, ROUND, W/TROCAR, HOLE PATTERN, 10 IN, MEDIUM/LARGE, 3/16 X 49 IN

## (undated) DEVICE — SUTURE, SILK, 2-0, 18 IN, BLACK

## (undated) DEVICE — ELECTRODE, GROUND PLATE

## (undated) DEVICE — SUTURE, MONO, PDS 11, 54 IN, TP-1, VIOLET

## (undated) DEVICE — DRESSING, MEPILEX, BORDER FLEX, 6 X 8

## (undated) DEVICE — ELECTRODE, CORKSCREW NEEDLE 1.5M LENGTH

## (undated) DEVICE — WOUND SYSTEM, DEBRIDEMENT & CLEANING, O.R DUOPAK